# Patient Record
Sex: MALE | Race: ASIAN | NOT HISPANIC OR LATINO | Employment: FULL TIME | URBAN - METROPOLITAN AREA
[De-identification: names, ages, dates, MRNs, and addresses within clinical notes are randomized per-mention and may not be internally consistent; named-entity substitution may affect disease eponyms.]

---

## 2023-10-06 ENCOUNTER — HOSPITAL ENCOUNTER (OUTPATIENT)
Dept: NON INVASIVE DIAGNOSTICS | Facility: HOSPITAL | Age: 43
Discharge: HOME | End: 2023-10-06
Payer: COMMERCIAL

## 2023-10-06 VITALS
HEIGHT: 65 IN | HEART RATE: 114 BPM | WEIGHT: 154 LBS | SYSTOLIC BLOOD PRESSURE: 122 MMHG | BODY MASS INDEX: 25.66 KG/M2 | DIASTOLIC BLOOD PRESSURE: 71 MMHG

## 2023-10-06 DIAGNOSIS — K74.60 UNSPECIFIED CIRRHOSIS OF LIVER (HCC): ICD-10-CM

## 2023-10-06 LAB
AORTIC ROOT: 2.7 CM
APICAL FOUR CHAMBER EJECTION FRACTION: 63 %
ASCENDING AORTA: 2.6 CM
E WAVE DECELERATION TIME: 143 MS
E/A RATIO: 0.81
FRACTIONAL SHORTENING: 50 % (ref 28–44)
INTERVENTRICULAR SEPTUM IN DIASTOLE (PARASTERNAL SHORT AXIS VIEW): 0.9 CM
INTERVENTRICULAR SEPTUM: 0.9 CM (ref 0.6–1.1)
LAAS-AP2: 18.9 CM2
LAAS-AP4: 13.8 CM2
LEFT ATRIUM SIZE: 3.1 CM
LEFT ATRIUM VOLUME (MOD BIPLANE): 47 ML
LEFT INTERNAL DIMENSION IN SYSTOLE: 2.1 CM (ref 2.1–4)
LEFT VENTRICLE DIASTOLIC VOLUME (MOD BIPLANE): 68 ML
LEFT VENTRICLE SYSTOLIC VOLUME (MOD BIPLANE): 24 ML
LEFT VENTRICULAR INTERNAL DIMENSION IN DIASTOLE: 4.2 CM (ref 3.5–6)
LEFT VENTRICULAR POSTERIOR WALL IN END DIASTOLE: 0.9 CM
LEFT VENTRICULAR STROKE VOLUME: 63 ML
LV EF: 65 %
LVSV (TEICH): 63 ML
MV E'TISSUE VEL-SEP: 14 CM/S
MV PEAK A VEL: 1.58 M/S
MV PEAK E VEL: 128 CM/S
MV STENOSIS PRESSURE HALF TIME: 41 MS
MV VALVE AREA P 1/2 METHOD: 5.37 CM2
RIGHT ATRIUM AREA SYSTOLE A4C: 14.8 CM2
RIGHT VENTRICLE ID DIMENSION: 3.7 CM
SL CV LEFT ATRIUM LENGTH A2C: 4.8 CM
SL CV LV EF: 65
SL CV PED ECHO LEFT VENTRICLE DIASTOLIC VOLUME (MOD BIPLANE) 2D: 76 ML
SL CV PED ECHO LEFT VENTRICLE SYSTOLIC VOLUME (MOD BIPLANE) 2D: 14 ML
TR MAX PG: 8 MMHG
TR PEAK VELOCITY: 1.4 M/S
TRICUSPID VALVE PEAK REGURGITATION VELOCITY: 1.39 M/S

## 2023-10-06 PROCEDURE — 93306 TTE W/DOPPLER COMPLETE: CPT

## 2023-10-06 PROCEDURE — 93306 TTE W/DOPPLER COMPLETE: CPT | Performed by: INTERNAL MEDICINE

## 2023-10-08 ENCOUNTER — HOSPITAL ENCOUNTER (OUTPATIENT)
Dept: MRI IMAGING | Facility: HOSPITAL | Age: 43
Discharge: HOME | End: 2023-10-08
Payer: COMMERCIAL

## 2023-10-08 DIAGNOSIS — K74.60 UNSPECIFIED CIRRHOSIS OF LIVER (HCC): ICD-10-CM

## 2023-10-08 PROCEDURE — 74183 MRI ABD W/O CNTR FLWD CNTR: CPT

## 2023-10-08 PROCEDURE — G1004 CDSM NDSC: HCPCS

## 2023-10-08 PROCEDURE — A9585 GADOBUTROL INJECTION: HCPCS | Performed by: RADIOLOGY

## 2023-10-08 RX ORDER — GADOBUTROL 604.72 MG/ML
7 INJECTION INTRAVENOUS
Status: COMPLETED | OUTPATIENT
Start: 2023-10-08 | End: 2023-10-08

## 2023-10-08 RX ADMIN — GADOBUTROL 7 ML: 604.72 INJECTION INTRAVENOUS at 12:14

## 2023-10-24 ENCOUNTER — OFFICE VISIT (OUTPATIENT)
Dept: GASTROENTEROLOGY | Facility: CLINIC | Age: 43
End: 2023-10-24
Payer: COMMERCIAL

## 2023-10-24 VITALS
HEART RATE: 111 BPM | TEMPERATURE: 97.8 F | OXYGEN SATURATION: 96 % | BODY MASS INDEX: 23.65 KG/M2 | WEIGHT: 141.98 LBS | DIASTOLIC BLOOD PRESSURE: 82 MMHG | SYSTOLIC BLOOD PRESSURE: 134 MMHG | HEIGHT: 65 IN

## 2023-10-24 DIAGNOSIS — R60.0 LEG EDEMA: ICD-10-CM

## 2023-10-24 DIAGNOSIS — K74.60 CIRRHOSIS OF LIVER WITHOUT ASCITES, UNSPECIFIED HEPATIC CIRRHOSIS TYPE (HCC): Primary | ICD-10-CM

## 2023-10-24 DIAGNOSIS — I86.8 INTRA-ABDOMINAL VARICES: ICD-10-CM

## 2023-10-24 DIAGNOSIS — D61.818 PANCYTOPENIA (HCC): ICD-10-CM

## 2023-10-24 PROCEDURE — 99244 OFF/OP CNSLTJ NEW/EST MOD 40: CPT | Performed by: INTERNAL MEDICINE

## 2023-10-24 RX ORDER — METFORMIN HYDROCHLORIDE 500 MG/1
500 TABLET, EXTENDED RELEASE ORAL
COMMUNITY

## 2023-10-24 RX ORDER — ATORVASTATIN CALCIUM 10 MG/1
10 TABLET, FILM COATED ORAL DAILY
COMMUNITY

## 2023-10-24 NOTE — PROGRESS NOTES
[facility] - Outpatient Consultation  [facility] 44 y.o. male MRN: [id number]  Encounter: [id number]    PCP:  Mc Patel MD, [phone]  Referring Provider: Dr. Helen Rodriguez:      Summary:    Mr. Ryder Estes is a 44y.o. year old male with cirrhosis secondary to Nonalcoholic steatohepatitis and Auto immune hepatitis which is complicated with thrombocytopenia, and LE edema. Problem List and Plan:    Cirrhosis: Most MELD 3.0 Score is 11. Etiology is not clear, however he has risk for KRUGER related cirrhosis and has positive autoimmune studies (ELIGIO and elevated IgG). Liver biopsy would help determine etiology, however will need to address thrombocytopenia first.  He has no clinical indication for liver transplant at this time. MELD score < 15. Will plan to continue to check MELD labs every 3 months, imaging every 6 months and variceal screening every 1-2. Will need routine office visit every 3-6 months depending on stability. Thrombocytopenia/Pancytopenia:  Had BM biopsy recently. I do not have the report. I will refer him to our hematology team.  Hopefully they can review his BM biopsy as this degree of thrombocytopenia is not consistent with cirrhotic portal HTN alone. He requires EGD/liver biopsy and I hope hematology can help us address possibly benefit of doptelet or mulpleta. Volume: Ascites/Edema currently well controlled off diuretics. Will continue sodium restricted diet and continue to monitor for the development of ascites/edema. Hepatic Encephalopathy: No history of hepatic encephalopathy. Mr. Ryder Estes and his family/care giver are aware of the signs/symptoms of hepatic encephalopathy and understand to seek immediate medical attention should they arise. .  Esophageal Varices: No known history of esophageal varices, however MRI shows periesophageal and perigastric varices.   Would like to schedule EGD, however will need severe thrombocytopenia addressed first as above. Hepatoma Screening: Last abdominal imaging was an MRI done last month. Will plan for repeat imaging in March. Nutritional status: Mild sarcopenia suggest by physical exam.  Encouraged high protein snacking, low salt diet. If weight loss evident, will refer to nutritional counseling. Health Maintenance:  Mr. Valeriano Hill was counseled to avoid alcohol and tobacco products. Mr. Valeriano Hill was also advised to avoid raw seafood/oysters and from swimming in unchlorinated barrios, particularly from the [facility], due to increased risk of infection from Vibrio Vulnificus. Mr. Valeriano Hill was also instructed to seek immediate medical attention should he develop fevers over 101 F, evidence of GI bleeding (black or bloody stools, bloody or coffee ground emesis) or confusion. Mr. Valeriano Hill was advised to avoid NSAIDs, if possible, due to increase risk of renal dysfunction, and advised that if he should use acetaminophen, dose should not exceed 2 grams/day. Mr. Valeriano Hill was recommend to remain up to date with adult vaccination, including influenza, pneumovax and meningococcus. Inactivated HSV and zoster vaccine is safe and encouraged by PCP. Mr. Valeriano Hill was instructed to call either our office or that of his referring doctor should he develop worsening symptoms related to lower extremity edema, ascites, jaundice or excessive bruising/bleeding. FOLLOW-UP:  Return in about 3 months (around 1/24/2024). VISIT DIAGNOSES AND ORDERS:      1. Cirrhosis of liver without ascites, unspecified hepatic cirrhosis type (HCC)    2. Pancytopenia (HCC)    3. Leg edema    4.  Intra-abdominal varices      Orders Placed This Encounter   Procedures    CBC and differential    Protime-INR    Comprehensive metabolic panel    Ambulatory Referral to Hematology / Oncology     ______________________________________________________________________    HPI:  Mr. Milton Olson is a 44 y.o. male from [place] with medical history as outlined below, including type 2 diabetes, hyperlipidemia, history of dengue fever, strong family history of cirrhosis [2 maternal uncles], who comes in today for initial consultation. He has previously been, Dr. Allie Acevedo at [address] [place] [address], and Dr. Nasima Mckee at [facility] last month after referral for transplant evaluation. He was recently diagnosed with cirrhosis after right upper quadrant ultrasound, when hospitalized for sepsis and generalized weakness and May. He has a history of pancytopenia and underwent further evaluation including a bone marrow biopsy. He believes he has some LE edema at that time, which has resolved since. Work-up for chronic liver disease was done and was significant for a positive ELIGIO, elevated IgG. He does have a history of diabetes and hyperlipidemia. He did not undergo a liver biopsy. He had an MRI done on 10/8/2023 showing cirrhosis, splenomegaly and varices compatible with portal pretension with small volume of fluid in the gallbladder fossa. Most recent blood work is from 10/4/2023:  Sodium 141, creatinine 0.67, total protein 7.2, albumin 3.5, AST 54, ALT 30, total bilirubin 1.6, WBCs 2.9, hemoglobin 12.5, platelets 27, alpha-fetoprotein 6.4, INR 1.3. In the office today, he complains of difficulty sleeping and occasional daytime sleepiness. Mr. Mckenzie Martin denies recent or history of yellow eyes/skin, GI bleeding, abdominal distention with fluid, pruritus or confusion. He does complain of intermittent LE edema. He also complains of easy bruising and excessive bleeding. He denies abdominal pain, nausea, vomiting, heartburn, reflux, difficulty swallowing, early satiety, bloating, diarrhea, constipation or straining with passing stools. He does admit to forgetting to eat when busy at work, but denies lack of appetite.       REVIEW OF SYSTEMS:    Review of Systems  Per HPI    Historical Information   There is no problem list on file for this patient. Past Medical History:   Diagnosis Date    Cirrhosis ([address])     Diabetes mellitus ([address])     Low platelet count (HCC)     Spleen enlarged      History reviewed. No pertinent surgical history. Social History   Social History     Substance and Sexual Activity   Alcohol Use Not Currently     Social History     Substance and Sexual Activity   Drug Use Not Currently     Social History     Tobacco Use   Smoking Status Never   Smokeless Tobacco Never     History reviewed. No pertinent family history. Meds/Allergies       Current Outpatient Medications:     atorvastatin (LIPITOR) 10 mg tablet    metFORMIN (GLUCOPHAGE-XR) 500 mg 24 hr tablet    Allergies   Allergen Reactions    Aspirin Other (See Comments)     Pt states per Doctor            Objective     Blood pressure 134/82, pulse (!) 111, temperature 97.8 °F (36.6 °C), temperature source Tympanic, height 5' 5" (1.651 m), weight 64.4 kg (141 lb 15.6 oz), SpO2 96 %. Body mass index is 23.63 kg/m². PHYSICAL EXAM:           Physical Exam  Vitals reviewed. Constitutional:       General: He is not in acute distress. Appearance: Normal appearance. He is not ill-appearing. HENT:      Head: Normocephalic and atraumatic. Nose: Nose normal.      Mouth/Throat:      Pharynx: Oropharynx is clear. No posterior oropharyngeal erythema. Eyes:      General: No scleral icterus. Extraocular Movements: Extraocular movements intact. Cardiovascular:      Rate and Rhythm: Regular rhythm. Tachycardia present. Heart sounds: No murmur heard. Pulmonary:      Effort: Pulmonary effort is normal. No respiratory distress. Breath sounds: Normal breath sounds. No rales. Abdominal:      General: There is no distension. Palpations: Abdomen is soft. There is no shifting dullness, fluid wave, hepatomegaly or splenomegaly. Tenderness: There is no abdominal tenderness. There is no guarding.    Musculoskeletal:         General: No swelling. Normal range of motion. Cervical back: Normal range of motion and neck supple. Skin:     General: Skin is warm. Coloration: Skin is not jaundiced. Neurological:      General: No focal deficit present. Mental Status: He is oriented to person, place, and time. Psychiatric:         Mood and Affect: Mood normal.              Lab Results:   No results found for: "SODIUM", "K", "BUN", "CREATININE", "MG", "TBILI", "ALKPHOS", "ALB", "AST", "ALT", "WBC", "PLT", "HGB", "PT", "INR"  No results found for: "AFP"  No results found for: "IRON", "FERRITIN", "CONCFE", "TRANSFERRIN", "ELIGIO", "SMOOTHMUSCAB", "AMAIFA", "IGG", "IGM", "CERULOPLSM", "AATPHENOTYPE", "HAV", "HEPBSAG", "HEPBSAB", "HEPBCAB", "HEPCAB"  No results found for: "HGBA1C", "LABLIPI", "TSH"  Computed MELD 3.0 unavailable. Necessary lab results were not found in the last year. Computed MELD-Na unavailable. Necessary lab results were not found in the last year. Radiology Results:   I have reviewed the results of any radiology studies performed within the last 90 days. This includes:      MRI abdomen w wo contrast    Result Date: 10/18/2023  Narrative: MRI - ABDOMEN - WITH AND WITHOUT CONTRAST INDICATION: 44 years / Male  K66.58: Unspecified cirrhosis of liver. COMPARISON: None TECHNIQUE:  Multiplanar/multisequence MRI of the abdomen was performed before and after administration of contrast. Imaging performed on 1.5T MRI IV Contrast:  7 mL of Gadobutrol injection (SINGLE-DOSE) FINDINGS: LOWER CHEST:   Unremarkable. LIVER: Cirrhotic morphology of the liver. No suspicious mass. The hepatic veins and portal veins are patent. BILE DUCTS: No intrahepatic or extrahepatic bile duct dilation. GALLBLADDER: A small amount of free fluid around the gallbladder. No gallbladder wall thickening or gallbladder calculi. Pericholecystic fluid is likely secondary to cirrhosis. Darylene Pipe PANCREAS:  Unremarkable. ADRENAL GLANDS:  Unremarkable.  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 No hydroureteronephrosis. No suspicious renal mass. BOWEL:   No dilated loops of bowel. PERITONEUM/RETROPERITONEUM: No mass. No ascites. LYMPH NODES: No abdominal lymphadenopathy. VASCULAR STRUCTURES:  No aneurysm. Perisplenic, paraesophageal and gastrohepatic varices with a splenorenal shunt. ABDOMINAL WALL:  Unremarkable. OSSEOUS STRUCTURES:  No suspicious osseous lesion. Impression: Cirrhosis, splenomegaly and varices compatible with portal hypertension. Small volume of free fluid in the gallbladder fossa, without gallbladder wall thickening or cholelithiasis, likely secondary to cirrhosis. Clinical correlation is recommended to exclude possibility of acute cholecystitis. 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 The study was marked in EPIC for significant notification. Workstation performed: [id number]     Echo complete w/ contrast if indicated    Result Date: 10/6/2023  Narrative:   Left Ventricle: Left ventricular cavity size is normal. Wall thickness is normal. The left ventricular ejection fraction is 65%. Systolic function is normal. Wall motion is normal. Diastolic function is normal.   Right Ventricle: Right ventricular cavity size is normal. Systolic function is normal.   No significant valvular abnormalities.          Anju David MD  Hepatology/Gastroenterology

## 2023-10-24 NOTE — PATIENT INSTRUCTIONS
As discussed today:    Medications:  Continue taking your current medications without changes  Laboratory Testing (Listed below):  Please have blood work drawn in early November and then every 8 weeks. See hematology in consultation for your severe thrombocytopenia to discuss etiology and possible benefit of growth factors (doptelet or mulpleta)  Avoid alcohol and tobacco products. Also avoid raw seafood/oysters and avoid swimming/wading in unchlorinated barrios, particularly from the [facility], due to increased risk of infection from a bacteria called Vibrio Vulnificus. Avoid medications called NSAID's (Motrin/Ibuprofen, Naproxen/Naprosyn/Aleve) if possible, due to increase risk of kidney dysfunction, and if you use medications containing acetaminophen (Tylenol, percocet, Endocet, and many cough/cold medications), the dose should not exceed 2 grams/day. Seek immediate medical attention if you develop fevers over 101 F, have evidence of GI bleeding (black or bloody stools, bloody or coffee ground emesis) or confusion. Call our office if you develop worsening symptoms related to lower extremity edema, ascites, jaundice or excessive bruising/bleeding.

## 2023-10-25 ENCOUNTER — TELEPHONE (OUTPATIENT)
Dept: HEMATOLOGY ONCOLOGY | Facility: CLINIC | Age: 43
End: 2023-10-25

## 2023-10-25 NOTE — TELEPHONE ENCOUNTER
I called Yari Gautam in response to a referral that was received for patient to establish care with Medical Oncology. Outreach was made to schedule a consultation. Patient's voicemail is full and unable to accept messages at this time. Another attempt will be made to contact patient.

## 2023-11-30 ENCOUNTER — TELEPHONE (OUTPATIENT)
Dept: HEMATOLOGY ONCOLOGY | Facility: CLINIC | Age: 43
End: 2023-11-30

## 2023-11-30 NOTE — TELEPHONE ENCOUNTER
Appointment Confirmation   Who are you speaking with? Patient   If it is not the patient, are they listed on an active communication consent form? N/A   Which provider is the appointment scheduled with? Dr. Esther Rob   When is the appointment scheduled? Please list date and time 12/1/23 @ 8   At which location is the appointment scheduled to take place? [place]   Did caller verbalize understanding of appointment details?  Yes

## 2023-12-01 ENCOUNTER — TELEPHONE (OUTPATIENT)
Dept: HEMATOLOGY ONCOLOGY | Facility: CLINIC | Age: 43
End: 2023-12-01

## 2023-12-01 NOTE — TELEPHONE ENCOUNTER
Patient is requesting a call back he would like to speak with Dr. Lona Martin. He woke this morning not feeling well and called to rescheduled. I r/s him for first available consult 2/1/24 and he is requesting an earlier date because of his medical condition.   He can be reached at [phone]

## 2023-12-01 NOTE — TELEPHONE ENCOUNTER
Appointment Change  Cancel, Reschedule, Change to Virtual      Who are you speaking with? Patient   If it is not the patient, is the caller listed on the communication consent form? N/A   Which provider is the appointment scheduled with? Dr. Denise Gibson   When was the original appointment scheduled? Please list date and time 12/1/23 @ 8am   At which location is the appointment scheduled to take place? [place]   Was the appointment rescheduled? Was the appointment changed from an in person visit to a virtual visit? If so, please list the details of the change. Yes 2/1/24 @ 1pm   What is the reason for the appointment change? Patient isn't feeling well        Was STAR transport scheduled? no   Does STAR transport need to be scheduled for the new visit (if applicable) no   Does the patient need an infusion appointment rescheduled? no   Does the patient have an upcoming infusion appointment scheduled? If so, when? no   Is the patient undergoing chemotherapy? no   For appointments cancelled with less than 24 hours:  Was the no-show policy reviewed?  yes

## 2024-01-01 ENCOUNTER — APPOINTMENT (INPATIENT)
Dept: RADIOLOGY | Facility: HOSPITAL | Age: 44
DRG: 064 | End: 2024-01-01
Payer: COMMERCIAL

## 2024-01-01 ENCOUNTER — APPOINTMENT (EMERGENCY)
Dept: CT IMAGING | Facility: HOSPITAL | Age: 44
End: 2024-01-01
Payer: COMMERCIAL

## 2024-01-01 ENCOUNTER — APPOINTMENT (EMERGENCY)
Dept: RADIOLOGY | Facility: HOSPITAL | Age: 44
End: 2024-01-01
Payer: COMMERCIAL

## 2024-01-01 ENCOUNTER — APPOINTMENT (INPATIENT)
Dept: NEUROLOGY | Facility: CLINIC | Age: 44
DRG: 064 | End: 2024-01-01
Payer: COMMERCIAL

## 2024-01-01 ENCOUNTER — TELEPHONE (OUTPATIENT)
Dept: OTHER | Facility: OTHER | Age: 44
End: 2024-01-01

## 2024-01-01 ENCOUNTER — TELEPHONE (OUTPATIENT)
Dept: HEMATOLOGY ONCOLOGY | Facility: CLINIC | Age: 44
End: 2024-01-01

## 2024-01-01 ENCOUNTER — HOSPITAL ENCOUNTER (EMERGENCY)
Facility: HOSPITAL | Age: 44
End: 2024-09-10
Attending: STUDENT IN AN ORGANIZED HEALTH CARE EDUCATION/TRAINING PROGRAM | Admitting: STUDENT IN AN ORGANIZED HEALTH CARE EDUCATION/TRAINING PROGRAM
Payer: COMMERCIAL

## 2024-01-01 ENCOUNTER — TELEPHONE (OUTPATIENT)
Age: 44
End: 2024-01-01

## 2024-01-01 ENCOUNTER — HOSPITAL ENCOUNTER (INPATIENT)
Facility: HOSPITAL | Age: 44
LOS: 6 days | DRG: 064 | End: 2024-09-16
Attending: ANESTHESIOLOGY | Admitting: ANESTHESIOLOGY
Payer: COMMERCIAL

## 2024-01-01 VITALS
HEIGHT: 65 IN | HEART RATE: 124 BPM | OXYGEN SATURATION: 95 % | BODY MASS INDEX: 23.44 KG/M2 | SYSTOLIC BLOOD PRESSURE: 118 MMHG | DIASTOLIC BLOOD PRESSURE: 61 MMHG | TEMPERATURE: 99.3 F | RESPIRATION RATE: 11 BRPM

## 2024-01-01 VITALS
RESPIRATION RATE: 22 BRPM | HEART RATE: 90 BPM | SYSTOLIC BLOOD PRESSURE: 120 MMHG | BODY MASS INDEX: 23.44 KG/M2 | OXYGEN SATURATION: 100 % | DIASTOLIC BLOOD PRESSURE: 66 MMHG | TEMPERATURE: 97.7 F | WEIGHT: 140.87 LBS

## 2024-01-01 DIAGNOSIS — I61.9 ICH (INTRACEREBRAL HEMORRHAGE) (HCC): Primary | ICD-10-CM

## 2024-01-01 DIAGNOSIS — Z71.89 GOALS OF CARE, COUNSELING/DISCUSSION: ICD-10-CM

## 2024-01-01 DIAGNOSIS — Z51.5 PALLIATIVE CARE ENCOUNTER: ICD-10-CM

## 2024-01-01 LAB
ABO GROUP BLD BPU: NORMAL
ABO GROUP BLD: NORMAL
ALBUMIN SERPL BCG-MCNC: 2.2 G/DL (ref 3.5–5)
ALBUMIN SERPL BCG-MCNC: 2.5 G/DL (ref 3.5–5)
ALBUMIN SERPL BCG-MCNC: 3.1 G/DL (ref 3.5–5)
ALBUMIN SERPL BCG-MCNC: 3.1 G/DL (ref 3.5–5)
ALP SERPL-CCNC: 103 U/L (ref 34–104)
ALP SERPL-CCNC: 112 U/L (ref 34–104)
ALP SERPL-CCNC: 175 U/L (ref 34–104)
ALP SERPL-CCNC: 69 U/L (ref 34–104)
ALT SERPL W P-5'-P-CCNC: 22 U/L (ref 7–52)
ALT SERPL W P-5'-P-CCNC: 22 U/L (ref 7–52)
ALT SERPL W P-5'-P-CCNC: 25 U/L (ref 7–52)
ALT SERPL W P-5'-P-CCNC: 25 U/L (ref 7–52)
AMMONIA PLAS-SCNC: 46 UMOL/L (ref 18–72)
AMMONIA PLAS-SCNC: 87 UMOL/L (ref 18–72)
AMPHETAMINES SERPL QL SCN: NEGATIVE
ANION GAP SERPL CALCULATED.3IONS-SCNC: 10 MMOL/L (ref 4–13)
ANION GAP SERPL CALCULATED.3IONS-SCNC: 6 MMOL/L (ref 4–13)
ANION GAP SERPL CALCULATED.3IONS-SCNC: 7 MMOL/L (ref 4–13)
ANION GAP SERPL CALCULATED.3IONS-SCNC: 8 MMOL/L (ref 4–13)
ANION GAP SERPL CALCULATED.3IONS-SCNC: 9 MMOL/L (ref 4–13)
ANISOCYTOSIS BLD QL SMEAR: PRESENT
APTT PPP: 37 SECONDS (ref 23–34)
ARTERIAL PATENCY WRIST A: YES
AST SERPL W P-5'-P-CCNC: 46 U/L (ref 13–39)
AST SERPL W P-5'-P-CCNC: 49 U/L (ref 13–39)
AST SERPL W P-5'-P-CCNC: 55 U/L (ref 13–39)
AST SERPL W P-5'-P-CCNC: 55 U/L (ref 13–39)
ATRIAL RATE: 56 BPM
ATRIAL RATE: 63 BPM
ATRIAL RATE: 68 BPM
ATRIAL RATE: 81 BPM
ATRIAL RATE: 93 BPM
ATRIAL RATE: 96 BPM
BACTERIA UR QL AUTO: ABNORMAL /HPF
BARBITURATES UR QL: NEGATIVE
BASE EXCESS BLDA CALC-SCNC: -2 MMOL/L (ref -2–3)
BASE EXCESS BLDA CALC-SCNC: -3.7 MMOL/L
BASE EXCESS BLDA CALC-SCNC: -6 MMOL/L
BASE EXCESS BLDA CALC-SCNC: -6.1 MMOL/L
BASOPHILS # BLD AUTO: 0.01 THOUSANDS/ΜL (ref 0–0.1)
BASOPHILS # BLD AUTO: 0.01 THOUSANDS/ΜL (ref 0–0.1)
BASOPHILS # BLD MANUAL: 0 THOUSAND/UL (ref 0–0.1)
BASOPHILS # BLD MANUAL: 0 THOUSAND/UL (ref 0–0.1)
BASOPHILS # BLD MANUAL: 0.04 THOUSAND/UL (ref 0–0.1)
BASOPHILS NFR BLD AUTO: 0 % (ref 0–1)
BASOPHILS NFR BLD AUTO: 0 % (ref 0–1)
BASOPHILS NFR MAR MANUAL: 0 % (ref 0–1)
BASOPHILS NFR MAR MANUAL: 0 % (ref 0–1)
BASOPHILS NFR MAR MANUAL: 1 % (ref 0–1)
BENZODIAZ UR QL: NEGATIVE
BILIRUB SERPL-MCNC: 2.1 MG/DL (ref 0.2–1)
BILIRUB SERPL-MCNC: 2.35 MG/DL (ref 0.2–1)
BILIRUB SERPL-MCNC: 3.72 MG/DL (ref 0.2–1)
BILIRUB SERPL-MCNC: 5.01 MG/DL (ref 0.2–1)
BILIRUB UR QL STRIP: NEGATIVE
BLD GP AB SCN SERPL QL: NEGATIVE
BODY TEMPERATURE: 100.6 DEGREES FEHRENHEIT
BODY TEMPERATURE: 100.9 DEGREES FEHRENHEIT
BPU ID: NORMAL
BUN SERPL-MCNC: 10 MG/DL (ref 5–25)
BUN SERPL-MCNC: 10 MG/DL (ref 5–25)
BUN SERPL-MCNC: 11 MG/DL (ref 5–25)
BUN SERPL-MCNC: 12 MG/DL (ref 5–25)
BUN SERPL-MCNC: 12 MG/DL (ref 5–25)
BUN SERPL-MCNC: 13 MG/DL (ref 5–25)
BUN SERPL-MCNC: 14 MG/DL (ref 5–25)
BUN SERPL-MCNC: 8 MG/DL (ref 5–25)
BUN SERPL-MCNC: 9 MG/DL (ref 5–25)
CA-I BLD-SCNC: 1.05 MMOL/L (ref 1.12–1.32)
CA-I BLD-SCNC: 1.05 MMOL/L (ref 1.12–1.32)
CA-I BLD-SCNC: 1.08 MMOL/L (ref 1.12–1.32)
CA-I BLD-SCNC: 1.11 MMOL/L (ref 1.12–1.32)
CA-I BLD-SCNC: 1.13 MMOL/L (ref 1.12–1.32)
CA-I BLD-SCNC: 1.15 MMOL/L (ref 1.12–1.32)
CALCIUM ALBUM COR SERPL-MCNC: 8.5 MG/DL (ref 8.3–10.1)
CALCIUM ALBUM COR SERPL-MCNC: 8.6 MG/DL (ref 8.3–10.1)
CALCIUM ALBUM COR SERPL-MCNC: 8.9 MG/DL (ref 8.3–10.1)
CALCIUM ALBUM COR SERPL-MCNC: 8.9 MG/DL (ref 8.3–10.1)
CALCIUM SERPL-MCNC: 7.3 MG/DL (ref 8.4–10.2)
CALCIUM SERPL-MCNC: 7.4 MG/DL (ref 8.4–10.2)
CALCIUM SERPL-MCNC: 7.5 MG/DL (ref 8.4–10.2)
CALCIUM SERPL-MCNC: 7.5 MG/DL (ref 8.4–10.2)
CALCIUM SERPL-MCNC: 7.7 MG/DL (ref 8.4–10.2)
CALCIUM SERPL-MCNC: 7.8 MG/DL (ref 8.4–10.2)
CALCIUM SERPL-MCNC: 7.9 MG/DL (ref 8.4–10.2)
CALCIUM SERPL-MCNC: 8 MG/DL (ref 8.4–10.2)
CALCIUM SERPL-MCNC: 8.1 MG/DL (ref 8.4–10.2)
CALCIUM SERPL-MCNC: 8.2 MG/DL (ref 8.4–10.2)
CALCIUM SERPL-MCNC: 8.3 MG/DL (ref 8.4–10.2)
CFFMA (FUNCTIONAL FIBRINOGEN MAX AMPLITUDE): 16.7 MM (ref 15–32)
CFFMA (FUNCTIONAL FIBRINOGEN MAX AMPLITUDE): 17.9 MM (ref 15–32)
CFFMA (FUNCTIONAL FIBRINOGEN MAX AMPLITUDE): 18.4 MM (ref 15–32)
CHLORIDE SERPL-SCNC: 109 MMOL/L (ref 96–108)
CHLORIDE SERPL-SCNC: 109 MMOL/L (ref 96–108)
CHLORIDE SERPL-SCNC: 111 MMOL/L (ref 96–108)
CHLORIDE SERPL-SCNC: 116 MMOL/L (ref 96–108)
CHLORIDE SERPL-SCNC: 120 MMOL/L (ref 96–108)
CHLORIDE SERPL-SCNC: 120 MMOL/L (ref 96–108)
CHLORIDE SERPL-SCNC: 121 MMOL/L (ref 96–108)
CHLORIDE SERPL-SCNC: 122 MMOL/L (ref 96–108)
CHLORIDE SERPL-SCNC: 125 MMOL/L (ref 96–108)
CHLORIDE SERPL-SCNC: 126 MMOL/L (ref 96–108)
CHLORIDE SERPL-SCNC: 128 MMOL/L (ref 96–108)
CHLORIDE SERPL-SCNC: 130 MMOL/L (ref 96–108)
CHLORIDE SERPL-SCNC: 131 MMOL/L (ref 96–108)
CHLORIDE SERPL-SCNC: 134 MMOL/L (ref 96–108)
CHLORIDE SERPL-SCNC: 134 MMOL/L (ref 96–108)
CHLORIDE SERPL-SCNC: 135 MMOL/L (ref 96–108)
CKLY30: 0 % (ref 0–2.6)
CKLY30: 0 % (ref 0–2.6)
CKLY30: 1 % (ref 0–2.6)
CKR(REACTION TIME): 4.7 MIN (ref 4.6–9.1)
CKR(REACTION TIME): 5.4 MIN (ref 4.6–9.1)
CKR(REACTION TIME): 8.7 MIN (ref 4.6–9.1)
CLARITY UR: CLEAR
CO2 SERPL-SCNC: 14 MMOL/L (ref 21–32)
CO2 SERPL-SCNC: 14 MMOL/L (ref 21–32)
CO2 SERPL-SCNC: 15 MMOL/L (ref 21–32)
CO2 SERPL-SCNC: 16 MMOL/L (ref 21–32)
CO2 SERPL-SCNC: 17 MMOL/L (ref 21–32)
CO2 SERPL-SCNC: 18 MMOL/L (ref 21–32)
CO2 SERPL-SCNC: 19 MMOL/L (ref 21–32)
CO2 SERPL-SCNC: 19 MMOL/L (ref 21–32)
CO2 SERPL-SCNC: 20 MMOL/L (ref 21–32)
CO2 SERPL-SCNC: 21 MMOL/L (ref 21–32)
COCAINE UR QL: NEGATIVE
COLOR UR: ABNORMAL
CREAT SERPL-MCNC: 0.47 MG/DL (ref 0.6–1.3)
CREAT SERPL-MCNC: 0.54 MG/DL (ref 0.6–1.3)
CREAT SERPL-MCNC: 0.54 MG/DL (ref 0.6–1.3)
CREAT SERPL-MCNC: 0.55 MG/DL (ref 0.6–1.3)
CREAT SERPL-MCNC: 0.56 MG/DL (ref 0.6–1.3)
CREAT SERPL-MCNC: 0.57 MG/DL (ref 0.6–1.3)
CREAT SERPL-MCNC: 0.61 MG/DL (ref 0.6–1.3)
CREAT SERPL-MCNC: 0.61 MG/DL (ref 0.6–1.3)
CREAT SERPL-MCNC: 0.63 MG/DL (ref 0.6–1.3)
CREAT SERPL-MCNC: 0.66 MG/DL (ref 0.6–1.3)
CREAT SERPL-MCNC: 0.71 MG/DL (ref 0.6–1.3)
CREAT SERPL-MCNC: 0.74 MG/DL (ref 0.6–1.3)
CREAT SERPL-MCNC: 0.75 MG/DL (ref 0.6–1.3)
CREAT SERPL-MCNC: 0.76 MG/DL (ref 0.6–1.3)
CREAT SERPL-MCNC: 0.78 MG/DL (ref 0.6–1.3)
CREAT SERPL-MCNC: 0.82 MG/DL (ref 0.6–1.3)
CRTMA(RAPIDTEG MAX AMPLITUDE): 42.7 MM (ref 52–70)
CRTMA(RAPIDTEG MAX AMPLITUDE): 50.8 MM (ref 52–70)
CRTMA(RAPIDTEG MAX AMPLITUDE): 52.8 MM (ref 52–70)
DACRYOCYTES BLD QL SMEAR: PRESENT
EOSINOPHIL # BLD AUTO: 0.01 THOUSAND/ΜL (ref 0–0.61)
EOSINOPHIL # BLD AUTO: 0.04 THOUSAND/ΜL (ref 0–0.61)
EOSINOPHIL # BLD MANUAL: 0.03 THOUSAND/UL (ref 0–0.4)
EOSINOPHIL # BLD MANUAL: 0.06 THOUSAND/UL (ref 0–0.4)
EOSINOPHIL # BLD MANUAL: 0.19 THOUSAND/UL (ref 0–0.4)
EOSINOPHIL NFR BLD AUTO: 0 % (ref 0–6)
EOSINOPHIL NFR BLD AUTO: 1 % (ref 0–6)
EOSINOPHIL NFR BLD MANUAL: 2 % (ref 0–6)
EOSINOPHIL NFR BLD MANUAL: 2 % (ref 0–6)
EOSINOPHIL NFR BLD MANUAL: 5 % (ref 0–6)
ERYTHROCYTE [DISTWIDTH] IN BLOOD BY AUTOMATED COUNT: 16.5 % (ref 11.6–15.1)
ERYTHROCYTE [DISTWIDTH] IN BLOOD BY AUTOMATED COUNT: 16.6 % (ref 11.6–15.1)
ERYTHROCYTE [DISTWIDTH] IN BLOOD BY AUTOMATED COUNT: 16.8 % (ref 11.6–15.1)
ERYTHROCYTE [DISTWIDTH] IN BLOOD BY AUTOMATED COUNT: 17.2 % (ref 11.6–15.1)
ERYTHROCYTE [DISTWIDTH] IN BLOOD BY AUTOMATED COUNT: 17.3 % (ref 11.6–15.1)
ERYTHROCYTE [DISTWIDTH] IN BLOOD BY AUTOMATED COUNT: 17.7 % (ref 11.6–15.1)
ERYTHROCYTE [DISTWIDTH] IN BLOOD BY AUTOMATED COUNT: 18 % (ref 11.6–15.1)
EST. AVERAGE GLUCOSE BLD GHB EST-MCNC: 114 MG/DL
ETHANOL SERPL-MCNC: <10 MG/DL
FENTANYL UR QL SCN: NEGATIVE
GFR SERPL CREATININE-BSD FRML MDRD: 108 ML/MIN/1.73SQ M
GFR SERPL CREATININE-BSD FRML MDRD: 110 ML/MIN/1.73SQ M
GFR SERPL CREATININE-BSD FRML MDRD: 111 ML/MIN/1.73SQ M
GFR SERPL CREATININE-BSD FRML MDRD: 112 ML/MIN/1.73SQ M
GFR SERPL CREATININE-BSD FRML MDRD: 112 ML/MIN/1.73SQ M
GFR SERPL CREATININE-BSD FRML MDRD: 114 ML/MIN/1.73SQ M
GFR SERPL CREATININE-BSD FRML MDRD: 118 ML/MIN/1.73SQ M
GFR SERPL CREATININE-BSD FRML MDRD: 120 ML/MIN/1.73SQ M
GFR SERPL CREATININE-BSD FRML MDRD: 122 ML/MIN/1.73SQ M
GFR SERPL CREATININE-BSD FRML MDRD: 122 ML/MIN/1.73SQ M
GFR SERPL CREATININE-BSD FRML MDRD: 125 ML/MIN/1.73SQ M
GFR SERPL CREATININE-BSD FRML MDRD: 126 ML/MIN/1.73SQ M
GFR SERPL CREATININE-BSD FRML MDRD: 127 ML/MIN/1.73SQ M
GFR SERPL CREATININE-BSD FRML MDRD: 128 ML/MIN/1.73SQ M
GFR SERPL CREATININE-BSD FRML MDRD: 128 ML/MIN/1.73SQ M
GFR SERPL CREATININE-BSD FRML MDRD: 136 ML/MIN/1.73SQ M
GLUCOSE SERPL-MCNC: 129 MG/DL (ref 65–140)
GLUCOSE SERPL-MCNC: 132 MG/DL (ref 65–140)
GLUCOSE SERPL-MCNC: 133 MG/DL (ref 65–140)
GLUCOSE SERPL-MCNC: 141 MG/DL (ref 65–140)
GLUCOSE SERPL-MCNC: 145 MG/DL (ref 65–140)
GLUCOSE SERPL-MCNC: 147 MG/DL (ref 65–140)
GLUCOSE SERPL-MCNC: 148 MG/DL (ref 65–140)
GLUCOSE SERPL-MCNC: 149 MG/DL (ref 65–140)
GLUCOSE SERPL-MCNC: 153 MG/DL (ref 65–140)
GLUCOSE SERPL-MCNC: 154 MG/DL (ref 65–140)
GLUCOSE SERPL-MCNC: 156 MG/DL (ref 65–140)
GLUCOSE SERPL-MCNC: 158 MG/DL (ref 65–140)
GLUCOSE SERPL-MCNC: 159 MG/DL (ref 65–140)
GLUCOSE SERPL-MCNC: 161 MG/DL (ref 65–140)
GLUCOSE SERPL-MCNC: 162 MG/DL (ref 65–140)
GLUCOSE SERPL-MCNC: 165 MG/DL (ref 65–140)
GLUCOSE SERPL-MCNC: 168 MG/DL (ref 65–140)
GLUCOSE SERPL-MCNC: 170 MG/DL (ref 65–140)
GLUCOSE SERPL-MCNC: 171 MG/DL (ref 65–140)
GLUCOSE SERPL-MCNC: 173 MG/DL (ref 65–140)
GLUCOSE SERPL-MCNC: 178 MG/DL (ref 65–140)
GLUCOSE SERPL-MCNC: 179 MG/DL (ref 65–140)
GLUCOSE SERPL-MCNC: 182 MG/DL (ref 65–140)
GLUCOSE SERPL-MCNC: 187 MG/DL (ref 65–140)
GLUCOSE SERPL-MCNC: 190 MG/DL (ref 65–140)
GLUCOSE SERPL-MCNC: 191 MG/DL (ref 65–140)
GLUCOSE SERPL-MCNC: 194 MG/DL (ref 65–140)
GLUCOSE SERPL-MCNC: 195 MG/DL (ref 65–140)
GLUCOSE SERPL-MCNC: 202 MG/DL (ref 65–140)
GLUCOSE SERPL-MCNC: 202 MG/DL (ref 65–140)
GLUCOSE SERPL-MCNC: 203 MG/DL (ref 65–140)
GLUCOSE SERPL-MCNC: 212 MG/DL (ref 65–140)
GLUCOSE SERPL-MCNC: 242 MG/DL (ref 65–140)
GLUCOSE SERPL-MCNC: 244 MG/DL (ref 65–140)
GLUCOSE SERPL-MCNC: 261 MG/DL (ref 65–140)
GLUCOSE SERPL-MCNC: 274 MG/DL (ref 65–140)
GLUCOSE SERPL-MCNC: 278 MG/DL (ref 65–140)
GLUCOSE UR STRIP-MCNC: NEGATIVE MG/DL
HBA1C MFR BLD: 5.6 %
HCO3 BLDA-SCNC: 15.1 MMOL/L (ref 22–28)
HCO3 BLDA-SCNC: 16.1 MMOL/L (ref 22–28)
HCO3 BLDA-SCNC: 19.3 MMOL/L (ref 22–28)
HCO3 BLDA-SCNC: 20.2 MMOL/L (ref 24–30)
HCT VFR BLD AUTO: 29.2 % (ref 36.5–49.3)
HCT VFR BLD AUTO: 30.8 % (ref 36.5–49.3)
HCT VFR BLD AUTO: 31.6 % (ref 36.5–49.3)
HCT VFR BLD AUTO: 32 % (ref 36.5–49.3)
HCT VFR BLD AUTO: 32.5 % (ref 36.5–49.3)
HCT VFR BLD AUTO: 35.6 % (ref 36.5–49.3)
HCT VFR BLD AUTO: 36.1 % (ref 36.5–49.3)
HCT VFR BLD CALC: 35 % (ref 36.5–49.3)
HGB BLD-MCNC: 10.1 G/DL (ref 12–17)
HGB BLD-MCNC: 10.2 G/DL (ref 12–17)
HGB BLD-MCNC: 10.3 G/DL (ref 12–17)
HGB BLD-MCNC: 10.8 G/DL (ref 12–17)
HGB BLD-MCNC: 12.4 G/DL (ref 12–17)
HGB BLD-MCNC: 12.5 G/DL (ref 12–17)
HGB BLD-MCNC: 9.6 G/DL (ref 12–17)
HGB BLDA-MCNC: 11.9 G/DL (ref 12–17)
HGB UR QL STRIP.AUTO: ABNORMAL
HYDROCODONE UR QL SCN: NEGATIVE
IMM GRANULOCYTES # BLD AUTO: 0.01 THOUSAND/UL (ref 0–0.2)
IMM GRANULOCYTES # BLD AUTO: 0.02 THOUSAND/UL (ref 0–0.2)
IMM GRANULOCYTES NFR BLD AUTO: 0 % (ref 0–2)
IMM GRANULOCYTES NFR BLD AUTO: 1 % (ref 0–2)
INR PPP: 1.43 (ref 0.85–1.19)
INR PPP: 1.44 (ref 0.85–1.19)
KETONES UR STRIP-MCNC: NEGATIVE MG/DL
LACTATE SERPL-SCNC: 2.7 MMOL/L (ref 0.5–2)
LACTATE SERPL-SCNC: 3.9 MMOL/L (ref 0.5–2)
LACTATE SERPL-SCNC: 4 MMOL/L (ref 0.5–2)
LACTATE SERPL-SCNC: 4 MMOL/L (ref 0.5–2)
LEUKOCYTE ESTERASE UR QL STRIP: NEGATIVE
LYMPHOCYTES # BLD AUTO: 0.33 THOUSAND/UL (ref 0.6–4.47)
LYMPHOCYTES # BLD AUTO: 0.43 THOUSAND/UL (ref 0.6–4.47)
LYMPHOCYTES # BLD AUTO: 0.68 THOUSANDS/ΜL (ref 0.6–4.47)
LYMPHOCYTES # BLD AUTO: 0.77 THOUSAND/UL (ref 0.6–4.47)
LYMPHOCYTES # BLD AUTO: 0.93 THOUSANDS/ΜL (ref 0.6–4.47)
LYMPHOCYTES # BLD AUTO: 21 % (ref 14–44)
LYMPHOCYTES # BLD AUTO: 25 % (ref 14–44)
LYMPHOCYTES # BLD AUTO: 9 % (ref 14–44)
LYMPHOCYTES NFR BLD AUTO: 16 % (ref 14–44)
LYMPHOCYTES NFR BLD AUTO: 21 % (ref 14–44)
MACROCYTES BLD QL AUTO: PRESENT
MACROCYTES BLD QL AUTO: PRESENT
MAGNESIUM SERPL-MCNC: 1.4 MG/DL (ref 1.9–2.7)
MAGNESIUM SERPL-MCNC: 1.5 MG/DL (ref 1.9–2.7)
MAGNESIUM SERPL-MCNC: 1.7 MG/DL (ref 1.9–2.7)
MAGNESIUM SERPL-MCNC: 1.7 MG/DL (ref 1.9–2.7)
MAGNESIUM SERPL-MCNC: 1.8 MG/DL (ref 1.9–2.7)
MAGNESIUM SERPL-MCNC: 2 MG/DL (ref 1.9–2.7)
MAGNESIUM SERPL-MCNC: 2 MG/DL (ref 1.9–2.7)
MCH RBC QN AUTO: 34.6 PG (ref 26.8–34.3)
MCH RBC QN AUTO: 35.1 PG (ref 26.8–34.3)
MCH RBC QN AUTO: 35.4 PG (ref 26.8–34.3)
MCH RBC QN AUTO: 35.4 PG (ref 26.8–34.3)
MCH RBC QN AUTO: 35.8 PG (ref 26.8–34.3)
MCH RBC QN AUTO: 35.9 PG (ref 26.8–34.3)
MCH RBC QN AUTO: 36.3 PG (ref 26.8–34.3)
MCHC RBC AUTO-ENTMCNC: 31.9 G/DL (ref 31.4–37.4)
MCHC RBC AUTO-ENTMCNC: 32.6 G/DL (ref 31.4–37.4)
MCHC RBC AUTO-ENTMCNC: 32.8 G/DL (ref 31.4–37.4)
MCHC RBC AUTO-ENTMCNC: 32.9 G/DL (ref 31.4–37.4)
MCHC RBC AUTO-ENTMCNC: 33.2 G/DL (ref 31.4–37.4)
MCHC RBC AUTO-ENTMCNC: 34.6 G/DL (ref 31.4–37.4)
MCHC RBC AUTO-ENTMCNC: 34.8 G/DL (ref 31.4–37.4)
MCV RBC AUTO: 102 FL (ref 82–98)
MCV RBC AUTO: 104 FL (ref 82–98)
MCV RBC AUTO: 106 FL (ref 82–98)
MCV RBC AUTO: 107 FL (ref 82–98)
MCV RBC AUTO: 108 FL (ref 82–98)
MCV RBC AUTO: 108 FL (ref 82–98)
MCV RBC AUTO: 112 FL (ref 82–98)
METAMYELOCYTE ABSOLUTE CT: 0.02 THOUSAND/UL (ref 0–0.1)
METAMYELOCYTES NFR BLD MANUAL: 1 % (ref 0–1)
METHADONE UR QL: NEGATIVE
MONOCYTES # BLD AUTO: 0.06 THOUSAND/UL (ref 0–1.22)
MONOCYTES # BLD AUTO: 0.15 THOUSAND/UL (ref 0–1.22)
MONOCYTES # BLD AUTO: 0.16 THOUSAND/UL (ref 0–1.22)
MONOCYTES # BLD AUTO: 0.24 THOUSAND/ΜL (ref 0.17–1.22)
MONOCYTES # BLD AUTO: 0.59 THOUSAND/ΜL (ref 0.17–1.22)
MONOCYTES NFR BLD AUTO: 13 % (ref 4–12)
MONOCYTES NFR BLD AUTO: 6 % (ref 4–12)
MONOCYTES NFR BLD: 4 % (ref 4–12)
MONOCYTES NFR BLD: 4 % (ref 4–12)
MONOCYTES NFR BLD: 5 % (ref 4–12)
MYELOCYTE ABSOLUTE CT: 0.02 THOUSAND/UL (ref 0–0.1)
MYELOCYTES NFR BLD MANUAL: 1 % (ref 0–1)
NEUTROPHILS # BLD AUTO: 2.83 THOUSANDS/ΜL (ref 1.85–7.62)
NEUTROPHILS # BLD AUTO: 3.37 THOUSANDS/ΜL (ref 1.85–7.62)
NEUTROPHILS # BLD MANUAL: 1.05 THOUSAND/UL (ref 1.85–7.62)
NEUTROPHILS # BLD MANUAL: 2.08 THOUSAND/UL (ref 1.85–7.62)
NEUTROPHILS # BLD MANUAL: 3.07 THOUSAND/UL (ref 1.85–7.62)
NEUTS BAND NFR BLD MANUAL: 1 % (ref 0–8)
NEUTS BAND NFR BLD MANUAL: 21 % (ref 0–8)
NEUTS BAND NFR BLD MANUAL: 35 % (ref 0–8)
NEUTS SEG NFR BLD AUTO: 44 % (ref 43–75)
NEUTS SEG NFR BLD AUTO: 49 % (ref 43–75)
NEUTS SEG NFR BLD AUTO: 64 % (ref 43–75)
NEUTS SEG NFR BLD AUTO: 67 % (ref 43–75)
NEUTS SEG NFR BLD AUTO: 78 % (ref 43–75)
NITRITE UR QL STRIP: NEGATIVE
NON-SQ EPI CELLS URNS QL MICRO: ABNORMAL /HPF
NRBC BLD AUTO-RTO: 0 /100 WBCS
NRBC BLD AUTO-RTO: 1 /100 WBC (ref 0–2)
NRBC BLD AUTO-RTO: 1 /100 WBCS
NRBC BLD AUTO-RTO: 13 /100 WBC (ref 0–2)
NRBC BLD AUTO-RTO: 2 /100 WBCS
NRBC BLD AUTO-RTO: 7 /100 WBCS
O2 CT BLDA-SCNC: 13.3 ML/DL (ref 16–23)
O2 CT BLDA-SCNC: 14.7 ML/DL (ref 16–23)
O2 CT BLDA-SCNC: 17.3 ML/DL (ref 16–23)
OPIATES UR QL SCN: NEGATIVE
OSMOLALITY UR/SERPL-RTO: 296 MMOL/KG (ref 282–298)
OSMOLALITY UR/SERPL-RTO: 304 MMOL/KG (ref 282–298)
OSMOLALITY UR/SERPL-RTO: 309 MMOL/KG (ref 282–298)
OSMOLALITY UR/SERPL-RTO: 317 MMOL/KG (ref 282–298)
OSMOLALITY UR/SERPL-RTO: 321 MMOL/KG (ref 282–298)
OSMOLALITY UR/SERPL-RTO: 329 MMOL/KG (ref 282–298)
OSMOLALITY UR/SERPL-RTO: 331 MMOL/KG (ref 282–298)
OSMOLALITY UR/SERPL-RTO: 335 MMOL/KG (ref 282–298)
OXYCODONE+OXYMORPHONE UR QL SCN: NEGATIVE
OXYHGB MFR BLDA: 95.6 % (ref 94–97)
OXYHGB MFR BLDA: 98.1 % (ref 94–97)
OXYHGB MFR BLDA: 98.5 % (ref 94–97)
P AXIS: 33 DEGREES
P AXIS: 34 DEGREES
P AXIS: 35 DEGREES
P AXIS: 41 DEGREES
P AXIS: 60 DEGREES
P AXIS: 66 DEGREES
PCO2 BLD: 21 MMOL/L (ref 21–32)
PCO2 BLD: 26.5 MM HG (ref 42–50)
PCO2 BLDA: 19.4 MM HG (ref 36–44)
PCO2 BLDA: 21.7 MM HG (ref 36–44)
PCO2 BLDA: 29 MM HG (ref 36–44)
PCO2 TEMP ADJ BLDA: 20.4 MM HG (ref 36–44)
PCO2 TEMP ADJ BLDA: 23 MM HG (ref 36–44)
PCP UR QL: NEGATIVE
PH BLD: 7.47 [PH] (ref 7.35–7.45)
PH BLD: 7.49 [PH] (ref 7.35–7.45)
PH BLD: 7.49 [PH] (ref 7.3–7.4)
PH BLDA: 7.44 [PH] (ref 7.35–7.45)
PH BLDA: 7.49 [PH] (ref 7.35–7.45)
PH BLDA: 7.51 [PH] (ref 7.35–7.45)
PH UR STRIP.AUTO: 5.5 [PH]
PHOSPHATE SERPL-MCNC: 1.6 MG/DL (ref 2.7–4.5)
PHOSPHATE SERPL-MCNC: 1.9 MG/DL (ref 2.7–4.5)
PHOSPHATE SERPL-MCNC: 2.3 MG/DL (ref 2.7–4.5)
PHOSPHATE SERPL-MCNC: 2.6 MG/DL (ref 2.7–4.5)
PHOSPHATE SERPL-MCNC: 2.8 MG/DL (ref 2.7–4.5)
PHOSPHATE SERPL-MCNC: 3.1 MG/DL (ref 2.7–4.5)
PHOSPHATE SERPL-MCNC: 5.2 MG/DL (ref 2.7–4.5)
PLATELET # BLD AUTO: 18 THOUSANDS/UL (ref 149–390)
PLATELET # BLD AUTO: 20 THOUSANDS/UL (ref 149–390)
PLATELET # BLD AUTO: 32 THOUSANDS/UL (ref 149–390)
PLATELET # BLD AUTO: 33 THOUSANDS/UL (ref 149–390)
PLATELET # BLD AUTO: 35 THOUSANDS/UL (ref 149–390)
PLATELET # BLD AUTO: 45 THOUSANDS/UL (ref 149–390)
PLATELET # BLD AUTO: 53 THOUSANDS/UL (ref 149–390)
PLATELET # BLD AUTO: 59 THOUSANDS/UL (ref 149–390)
PLATELET # BLD AUTO: 67 THOUSANDS/UL (ref 149–390)
PLATELET BLD QL SMEAR: ABNORMAL
PMV BLD AUTO: 11.1 FL (ref 8.9–12.7)
PMV BLD AUTO: 11.3 FL (ref 8.9–12.7)
PMV BLD AUTO: 11.4 FL (ref 8.9–12.7)
PMV BLD AUTO: 12 FL (ref 8.9–12.7)
PMV BLD AUTO: 12.2 FL (ref 8.9–12.7)
PMV BLD AUTO: 12.7 FL (ref 8.9–12.7)
PO2 BLD: 182.4 MM HG (ref 75–129)
PO2 BLD: 71 MM HG (ref 35–45)
PO2 BLD: 88.5 MM HG (ref 75–129)
PO2 BLDA: 137.9 MM HG (ref 75–129)
PO2 BLDA: 175.1 MM HG (ref 75–129)
PO2 BLDA: 82.4 MM HG (ref 75–129)
POIKILOCYTOSIS BLD QL SMEAR: PRESENT
POLYCHROMASIA BLD QL SMEAR: PRESENT
POTASSIUM BLD-SCNC: 3.8 MMOL/L (ref 3.5–5.3)
POTASSIUM SERPL-SCNC: 2.7 MMOL/L (ref 3.5–5.3)
POTASSIUM SERPL-SCNC: 2.9 MMOL/L (ref 3.5–5.3)
POTASSIUM SERPL-SCNC: 3.1 MMOL/L (ref 3.5–5.3)
POTASSIUM SERPL-SCNC: 3.1 MMOL/L (ref 3.5–5.3)
POTASSIUM SERPL-SCNC: 3.5 MMOL/L (ref 3.5–5.3)
POTASSIUM SERPL-SCNC: 3.6 MMOL/L (ref 3.5–5.3)
POTASSIUM SERPL-SCNC: 3.6 MMOL/L (ref 3.5–5.3)
POTASSIUM SERPL-SCNC: 3.7 MMOL/L (ref 3.5–5.3)
POTASSIUM SERPL-SCNC: 3.8 MMOL/L (ref 3.5–5.3)
POTASSIUM SERPL-SCNC: 3.9 MMOL/L (ref 3.5–5.3)
POTASSIUM SERPL-SCNC: 4 MMOL/L (ref 3.5–5.3)
POTASSIUM SERPL-SCNC: 4 MMOL/L (ref 3.5–5.3)
POTASSIUM SERPL-SCNC: 4.2 MMOL/L (ref 3.5–5.3)
POTASSIUM SERPL-SCNC: 4.3 MMOL/L (ref 3.5–5.3)
PR INTERVAL: 113 MS
PR INTERVAL: 125 MS
PR INTERVAL: 129 MS
PR INTERVAL: 150 MS
PR INTERVAL: 150 MS
PR INTERVAL: 154 MS
PROCALCITONIN SERPL-MCNC: 0.7 NG/ML
PROT SERPL-MCNC: 5.3 G/DL (ref 6.4–8.4)
PROT SERPL-MCNC: 5.7 G/DL (ref 6.4–8.4)
PROT SERPL-MCNC: 7 G/DL (ref 6.4–8.4)
PROT SERPL-MCNC: 7.3 G/DL (ref 6.4–8.4)
PROT UR STRIP-MCNC: ABNORMAL MG/DL
PROTHROMBIN TIME: 17.8 SECONDS (ref 12.3–15)
PROTHROMBIN TIME: 18.2 SECONDS (ref 12.3–15)
PS CM H2O: 12
PS VENT FIO2: 40
PS VENT FIO2: 80
PS VENT PEEP: 6
PS VENT PEEP: 6
QRS AXIS: 68 DEGREES
QRS AXIS: 71 DEGREES
QRS AXIS: 73 DEGREES
QRS AXIS: 74 DEGREES
QRS AXIS: 77 DEGREES
QRS AXIS: 81 DEGREES
QRSD INTERVAL: 75 MS
QRSD INTERVAL: 79 MS
QRSD INTERVAL: 79 MS
QRSD INTERVAL: 84 MS
QRSD INTERVAL: 88 MS
QRSD INTERVAL: 92 MS
QT INTERVAL: 350 MS
QT INTERVAL: 364 MS
QT INTERVAL: 425 MS
QT INTERVAL: 425 MS
QT INTERVAL: 429 MS
QT INTERVAL: 438 MS
QTC INTERVAL: 407 MS
QTC INTERVAL: 423 MS
QTC INTERVAL: 440 MS
QTC INTERVAL: 452 MS
QTC INTERVAL: 452 MS
QTC INTERVAL: 538 MS
RBC # BLD AUTO: 2.71 MILLION/UL (ref 3.88–5.62)
RBC # BLD AUTO: 2.85 MILLION/UL (ref 3.88–5.62)
RBC # BLD AUTO: 2.88 MILLION/UL (ref 3.88–5.62)
RBC # BLD AUTO: 2.98 MILLION/UL (ref 3.88–5.62)
RBC # BLD AUTO: 3.01 MILLION/UL (ref 3.88–5.62)
RBC # BLD AUTO: 3.42 MILLION/UL (ref 3.88–5.62)
RBC # BLD AUTO: 3.53 MILLION/UL (ref 3.88–5.62)
RBC #/AREA URNS AUTO: ABNORMAL /HPF
RBC MORPH BLD: PRESENT
RH BLD: POSITIVE
SAO2 % BLD FROM PO2: 96 % (ref 60–85)
SODIUM BLD-SCNC: 141 MMOL/L (ref 136–145)
SODIUM SERPL-SCNC: 138 MMOL/L (ref 135–147)
SODIUM SERPL-SCNC: 138 MMOL/L (ref 135–147)
SODIUM SERPL-SCNC: 142 MMOL/L (ref 135–147)
SODIUM SERPL-SCNC: 143 MMOL/L (ref 135–147)
SODIUM SERPL-SCNC: 146 MMOL/L (ref 135–147)
SODIUM SERPL-SCNC: 147 MMOL/L (ref 135–147)
SODIUM SERPL-SCNC: 148 MMOL/L (ref 135–147)
SODIUM SERPL-SCNC: 149 MMOL/L (ref 135–147)
SODIUM SERPL-SCNC: 150 MMOL/L (ref 135–147)
SODIUM SERPL-SCNC: 151 MMOL/L (ref 135–147)
SODIUM SERPL-SCNC: 153 MMOL/L (ref 135–147)
SODIUM SERPL-SCNC: 154 MMOL/L (ref 135–147)
SODIUM SERPL-SCNC: 154 MMOL/L (ref 135–147)
SODIUM SERPL-SCNC: 156 MMOL/L (ref 135–147)
SODIUM SERPL-SCNC: 159 MMOL/L (ref 135–147)
SODIUM SERPL-SCNC: 160 MMOL/L (ref 135–147)
SP GR UR STRIP.AUTO: 1.01 (ref 1–1.03)
SPECIMEN EXPIRATION DATE: NORMAL
SPECIMEN SOURCE: ABNORMAL
T WAVE AXIS: 29 DEGREES
T WAVE AXIS: 58 DEGREES
T WAVE AXIS: 61 DEGREES
T WAVE AXIS: 69 DEGREES
T WAVE AXIS: 69 DEGREES
T WAVE AXIS: 70 DEGREES
THC UR QL: NEGATIVE
UNIT DISPENSE STATUS: NORMAL
UNIT PRODUCT CODE: NORMAL
UNIT PRODUCT VOLUME: 300 ML
UNIT RH: NORMAL
UROBILINOGEN UR STRIP-ACNC: <2 MG/DL
VANCOMYCIN SERPL-MCNC: 22.5 UG/ML (ref 10–20)
VARIANT LYMPHS # BLD AUTO: 1 %
VARIANT LYMPHS # BLD AUTO: 2 %
VENT - PS: ABNORMAL
VENT - PS: ABNORMAL
VENTRICULAR RATE: 56 BPM
VENTRICULAR RATE: 63 BPM
VENTRICULAR RATE: 68 BPM
VENTRICULAR RATE: 81 BPM
VENTRICULAR RATE: 93 BPM
VENTRICULAR RATE: 96 BPM
WBC # BLD AUTO: 1.5 THOUSAND/UL (ref 4.31–10.16)
WBC # BLD AUTO: 3.06 THOUSAND/UL (ref 4.31–10.16)
WBC # BLD AUTO: 3.89 THOUSAND/UL (ref 4.31–10.16)
WBC # BLD AUTO: 4.32 THOUSAND/UL (ref 4.31–10.16)
WBC # BLD AUTO: 4.42 THOUSAND/UL (ref 4.31–10.16)
WBC # BLD AUTO: 4.77 THOUSAND/UL (ref 4.31–10.16)
WBC # BLD AUTO: 5.52 THOUSAND/UL (ref 4.31–10.16)
WBC #/AREA URNS AUTO: ABNORMAL /HPF

## 2024-01-01 PROCEDURE — 80048 BASIC METABOLIC PNL TOTAL CA: CPT

## 2024-01-01 PROCEDURE — 80202 ASSAY OF VANCOMYCIN: CPT | Performed by: EMERGENCY MEDICINE

## 2024-01-01 PROCEDURE — 82330 ASSAY OF CALCIUM: CPT

## 2024-01-01 PROCEDURE — 80053 COMPREHEN METABOLIC PANEL: CPT | Performed by: STUDENT IN AN ORGANIZED HEALTH CARE EDUCATION/TRAINING PROGRAM

## 2024-01-01 PROCEDURE — 85347 COAGULATION TIME ACTIVATED: CPT | Performed by: NURSE PRACTITIONER

## 2024-01-01 PROCEDURE — 83036 HEMOGLOBIN GLYCOSYLATED A1C: CPT | Performed by: NURSE PRACTITIONER

## 2024-01-01 PROCEDURE — 82948 REAGENT STRIP/BLOOD GLUCOSE: CPT

## 2024-01-01 PROCEDURE — 85049 AUTOMATED PLATELET COUNT: CPT | Performed by: NURSE PRACTITIONER

## 2024-01-01 PROCEDURE — 94003 VENT MGMT INPAT SUBQ DAY: CPT

## 2024-01-01 PROCEDURE — 96365 THER/PROPH/DIAG IV INF INIT: CPT

## 2024-01-01 PROCEDURE — 94760 N-INVAS EAR/PLS OXIMETRY 1: CPT

## 2024-01-01 PROCEDURE — 80048 BASIC METABOLIC PNL TOTAL CA: CPT | Performed by: NURSE PRACTITIONER

## 2024-01-01 PROCEDURE — 85397 CLOTTING FUNCT ACTIVITY: CPT

## 2024-01-01 PROCEDURE — 95715 VEEG EA 12-26HR INTMT MNTR: CPT

## 2024-01-01 PROCEDURE — 82947 ASSAY GLUCOSE BLOOD QUANT: CPT

## 2024-01-01 PROCEDURE — 85007 BL SMEAR W/DIFF WBC COUNT: CPT

## 2024-01-01 PROCEDURE — 87040 BLOOD CULTURE FOR BACTERIA: CPT | Performed by: REGISTERED NURSE

## 2024-01-01 PROCEDURE — 99291 CRITICAL CARE FIRST HOUR: CPT | Performed by: INTERNAL MEDICINE

## 2024-01-01 PROCEDURE — 95700 EEG CONT REC W/VID EEG TECH: CPT

## 2024-01-01 PROCEDURE — 80053 COMPREHEN METABOLIC PANEL: CPT

## 2024-01-01 PROCEDURE — 99291 CRITICAL CARE FIRST HOUR: CPT | Performed by: EMERGENCY MEDICINE

## 2024-01-01 PROCEDURE — 85576 BLOOD PLATELET AGGREGATION: CPT | Performed by: NURSE PRACTITIONER

## 2024-01-01 PROCEDURE — 99255 IP/OBS CONSLTJ NEW/EST HI 80: CPT | Performed by: NURSE PRACTITIONER

## 2024-01-01 PROCEDURE — 83930 ASSAY OF BLOOD OSMOLALITY: CPT | Performed by: NURSE PRACTITIONER

## 2024-01-01 PROCEDURE — 84145 PROCALCITONIN (PCT): CPT | Performed by: REGISTERED NURSE

## 2024-01-01 PROCEDURE — 94002 VENT MGMT INPAT INIT DAY: CPT

## 2024-01-01 PROCEDURE — 85025 COMPLETE CBC W/AUTO DIFF WBC: CPT

## 2024-01-01 PROCEDURE — 84100 ASSAY OF PHOSPHORUS: CPT

## 2024-01-01 PROCEDURE — 82140 ASSAY OF AMMONIA: CPT | Performed by: NURSE PRACTITIONER

## 2024-01-01 PROCEDURE — 71045 X-RAY EXAM CHEST 1 VIEW: CPT

## 2024-01-01 PROCEDURE — NC001 PR NO CHARGE: Performed by: REGISTERED NURSE

## 2024-01-01 PROCEDURE — 85025 COMPLETE CBC W/AUTO DIFF WBC: CPT | Performed by: NURSE PRACTITIONER

## 2024-01-01 PROCEDURE — 36415 COLL VENOUS BLD VENIPUNCTURE: CPT | Performed by: STUDENT IN AN ORGANIZED HEALTH CARE EDUCATION/TRAINING PROGRAM

## 2024-01-01 PROCEDURE — 93005 ELECTROCARDIOGRAM TRACING: CPT

## 2024-01-01 PROCEDURE — 86850 RBC ANTIBODY SCREEN: CPT | Performed by: NURSE PRACTITIONER

## 2024-01-01 PROCEDURE — 85384 FIBRINOGEN ACTIVITY: CPT | Performed by: NURSE PRACTITIONER

## 2024-01-01 PROCEDURE — 85347 COAGULATION TIME ACTIVATED: CPT | Performed by: STUDENT IN AN ORGANIZED HEALTH CARE EDUCATION/TRAINING PROGRAM

## 2024-01-01 PROCEDURE — 70450 CT HEAD/BRAIN W/O DYE: CPT

## 2024-01-01 PROCEDURE — 93010 ELECTROCARDIOGRAM REPORT: CPT | Performed by: STUDENT IN AN ORGANIZED HEALTH CARE EDUCATION/TRAINING PROGRAM

## 2024-01-01 PROCEDURE — 85049 AUTOMATED PLATELET COUNT: CPT

## 2024-01-01 PROCEDURE — 93010 ELECTROCARDIOGRAM REPORT: CPT | Performed by: INTERNAL MEDICINE

## 2024-01-01 PROCEDURE — 85027 COMPLETE CBC AUTOMATED: CPT | Performed by: REGISTERED NURSE

## 2024-01-01 PROCEDURE — 85610 PROTHROMBIN TIME: CPT | Performed by: NURSE PRACTITIONER

## 2024-01-01 PROCEDURE — 5A1955Z RESPIRATORY VENTILATION, GREATER THAN 96 CONSECUTIVE HOURS: ICD-10-PCS | Performed by: ANESTHESIOLOGY

## 2024-01-01 PROCEDURE — 87186 SC STD MICRODIL/AGAR DIL: CPT | Performed by: REGISTERED NURSE

## 2024-01-01 PROCEDURE — 85384 FIBRINOGEN ACTIVITY: CPT

## 2024-01-01 PROCEDURE — 87147 CULTURE TYPE IMMUNOLOGIC: CPT | Performed by: REGISTERED NURSE

## 2024-01-01 PROCEDURE — 85014 HEMATOCRIT: CPT

## 2024-01-01 PROCEDURE — 82805 BLOOD GASES W/O2 SATURATION: CPT | Performed by: EMERGENCY MEDICINE

## 2024-01-01 PROCEDURE — 86901 BLOOD TYPING SEROLOGIC RH(D): CPT | Performed by: NURSE PRACTITIONER

## 2024-01-01 PROCEDURE — 83735 ASSAY OF MAGNESIUM: CPT

## 2024-01-01 PROCEDURE — 94664 DEMO&/EVAL PT USE INHALER: CPT

## 2024-01-01 PROCEDURE — 96375 TX/PRO/DX INJ NEW DRUG ADDON: CPT

## 2024-01-01 PROCEDURE — 81001 URINALYSIS AUTO W/SCOPE: CPT | Performed by: STUDENT IN AN ORGANIZED HEALTH CARE EDUCATION/TRAINING PROGRAM

## 2024-01-01 PROCEDURE — P9037 PLATE PHERES LEUKOREDU IRRAD: HCPCS

## 2024-01-01 PROCEDURE — 36556 INSERT NON-TUNNEL CV CATH: CPT | Performed by: NURSE PRACTITIONER

## 2024-01-01 PROCEDURE — 85027 COMPLETE CBC AUTOMATED: CPT

## 2024-01-01 PROCEDURE — 36430 TRANSFUSION BLD/BLD COMPNT: CPT

## 2024-01-01 PROCEDURE — 84132 ASSAY OF SERUM POTASSIUM: CPT

## 2024-01-01 PROCEDURE — 83605 ASSAY OF LACTIC ACID: CPT | Performed by: REGISTERED NURSE

## 2024-01-01 PROCEDURE — 85347 COAGULATION TIME ACTIVATED: CPT

## 2024-01-01 PROCEDURE — 95720 EEG PHY/QHP EA INCR W/VEEG: CPT | Performed by: PSYCHIATRY & NEUROLOGY

## 2024-01-01 PROCEDURE — 36556 INSERT NON-TUNNEL CV CATH: CPT | Performed by: ANESTHESIOLOGY

## 2024-01-01 PROCEDURE — 82077 ASSAY SPEC XCP UR&BREATH IA: CPT | Performed by: STUDENT IN AN ORGANIZED HEALTH CARE EDUCATION/TRAINING PROGRAM

## 2024-01-01 PROCEDURE — 87205 SMEAR GRAM STAIN: CPT | Performed by: REGISTERED NURSE

## 2024-01-01 PROCEDURE — 96366 THER/PROPH/DIAG IV INF ADDON: CPT

## 2024-01-01 PROCEDURE — 99291 CRITICAL CARE FIRST HOUR: CPT | Performed by: ANESTHESIOLOGY

## 2024-01-01 PROCEDURE — 70498 CT ANGIOGRAPHY NECK: CPT

## 2024-01-01 PROCEDURE — 83735 ASSAY OF MAGNESIUM: CPT | Performed by: NURSE PRACTITIONER

## 2024-01-01 PROCEDURE — 83930 ASSAY OF BLOOD OSMOLALITY: CPT

## 2024-01-01 PROCEDURE — 02HV33Z INSERTION OF INFUSION DEVICE INTO SUPERIOR VENA CAVA, PERCUTANEOUS APPROACH: ICD-10-PCS | Performed by: ANESTHESIOLOGY

## 2024-01-01 PROCEDURE — 70496 CT ANGIOGRAPHY HEAD: CPT

## 2024-01-01 PROCEDURE — 80053 COMPREHEN METABOLIC PANEL: CPT | Performed by: NURSE PRACTITIONER

## 2024-01-01 PROCEDURE — 82805 BLOOD GASES W/O2 SATURATION: CPT | Performed by: NURSE PRACTITIONER

## 2024-01-01 PROCEDURE — NC001 PR NO CHARGE: Performed by: NURSE PRACTITIONER

## 2024-01-01 PROCEDURE — 82805 BLOOD GASES W/O2 SATURATION: CPT | Performed by: REGISTERED NURSE

## 2024-01-01 PROCEDURE — 99255 IP/OBS CONSLTJ NEW/EST HI 80: CPT

## 2024-01-01 PROCEDURE — 86900 BLOOD TYPING SEROLOGIC ABO: CPT | Performed by: NURSE PRACTITIONER

## 2024-01-01 PROCEDURE — 82803 BLOOD GASES ANY COMBINATION: CPT

## 2024-01-01 PROCEDURE — 84295 ASSAY OF SERUM SODIUM: CPT

## 2024-01-01 PROCEDURE — 84100 ASSAY OF PHOSPHORUS: CPT | Performed by: NURSE PRACTITIONER

## 2024-01-01 PROCEDURE — 85730 THROMBOPLASTIN TIME PARTIAL: CPT | Performed by: STUDENT IN AN ORGANIZED HEALTH CARE EDUCATION/TRAINING PROGRAM

## 2024-01-01 PROCEDURE — 82140 ASSAY OF AMMONIA: CPT | Performed by: EMERGENCY MEDICINE

## 2024-01-01 PROCEDURE — 82330 ASSAY OF CALCIUM: CPT | Performed by: NURSE PRACTITIONER

## 2024-01-01 PROCEDURE — 80307 DRUG TEST PRSMV CHEM ANLYZR: CPT | Performed by: STUDENT IN AN ORGANIZED HEALTH CARE EDUCATION/TRAINING PROGRAM

## 2024-01-01 PROCEDURE — 99285 EMERGENCY DEPT VISIT HI MDM: CPT

## 2024-01-01 PROCEDURE — 85576 BLOOD PLATELET AGGREGATION: CPT

## 2024-01-01 PROCEDURE — 85384 FIBRINOGEN ACTIVITY: CPT | Performed by: STUDENT IN AN ORGANIZED HEALTH CARE EDUCATION/TRAINING PROGRAM

## 2024-01-01 PROCEDURE — 85576 BLOOD PLATELET AGGREGATION: CPT | Performed by: STUDENT IN AN ORGANIZED HEALTH CARE EDUCATION/TRAINING PROGRAM

## 2024-01-01 PROCEDURE — 76937 US GUIDE VASCULAR ACCESS: CPT | Performed by: NURSE PRACTITIONER

## 2024-01-01 PROCEDURE — 96374 THER/PROPH/DIAG INJ IV PUSH: CPT

## 2024-01-01 PROCEDURE — 85007 BL SMEAR W/DIFF WBC COUNT: CPT | Performed by: REGISTERED NURSE

## 2024-01-01 PROCEDURE — 30233R1 TRANSFUSION OF NONAUTOLOGOUS PLATELETS INTO PERIPHERAL VEIN, PERCUTANEOUS APPROACH: ICD-10-PCS | Performed by: ANESTHESIOLOGY

## 2024-01-01 PROCEDURE — 87070 CULTURE OTHR SPECIMN AEROBIC: CPT | Performed by: REGISTERED NURSE

## 2024-01-01 PROCEDURE — 85397 CLOTTING FUNCT ACTIVITY: CPT | Performed by: STUDENT IN AN ORGANIZED HEALTH CARE EDUCATION/TRAINING PROGRAM

## 2024-01-01 PROCEDURE — 83605 ASSAY OF LACTIC ACID: CPT | Performed by: NURSE PRACTITIONER

## 2024-01-01 PROCEDURE — 85397 CLOTTING FUNCT ACTIVITY: CPT | Performed by: NURSE PRACTITIONER

## 2024-01-01 PROCEDURE — 85610 PROTHROMBIN TIME: CPT | Performed by: STUDENT IN AN ORGANIZED HEALTH CARE EDUCATION/TRAINING PROGRAM

## 2024-01-01 PROCEDURE — 99291 CRITICAL CARE FIRST HOUR: CPT | Performed by: PSYCHIATRY & NEUROLOGY

## 2024-01-01 RX ORDER — GLYCOPYRROLATE 0.2 MG/ML
0.4 INJECTION INTRAMUSCULAR; INTRAVENOUS
Status: DISCONTINUED | OUTPATIENT
Start: 2024-01-01 | End: 2024-09-16 | Stop reason: HOSPADM

## 2024-01-01 RX ORDER — SODIUM CHLORIDE, SODIUM GLUCONATE, SODIUM ACETATE, POTASSIUM CHLORIDE, MAGNESIUM CHLORIDE, SODIUM PHOSPHATE, DIBASIC, AND POTASSIUM PHOSPHATE .53; .5; .37; .037; .03; .012; .00082 G/100ML; G/100ML; G/100ML; G/100ML; G/100ML; G/100ML; G/100ML
1000 INJECTION, SOLUTION INTRAVENOUS ONCE
Status: COMPLETED | OUTPATIENT
Start: 2024-01-01 | End: 2024-01-01

## 2024-01-01 RX ORDER — DEXTROSE MONOHYDRATE AND SODIUM CHLORIDE 5; .45 G/100ML; G/100ML
50 INJECTION, SOLUTION INTRAVENOUS CONTINUOUS
Status: DISCONTINUED | OUTPATIENT
Start: 2024-01-01 | End: 2024-01-01

## 2024-01-01 RX ORDER — POTASSIUM CHLORIDE 20MEQ/15ML
40 LIQUID (ML) ORAL ONCE
Status: COMPLETED | OUTPATIENT
Start: 2024-01-01 | End: 2024-01-01

## 2024-01-01 RX ORDER — CALCIUM GLUCONATE 20 MG/ML
2 INJECTION, SOLUTION INTRAVENOUS ONCE
Status: DISCONTINUED | OUTPATIENT
Start: 2024-01-01 | End: 2024-01-01

## 2024-01-01 RX ORDER — NICARDIPINE HYDROCHLORIDE 2.5 MG/ML
INJECTION INTRAVENOUS
Status: DISPENSED
Start: 2024-01-01 | End: 2024-01-01

## 2024-01-01 RX ORDER — HYDROMORPHONE HCL/PF 1 MG/ML
1 SYRINGE (ML) INJECTION ONCE
Status: DISCONTINUED | OUTPATIENT
Start: 2024-01-01 | End: 2024-09-16 | Stop reason: HOSPADM

## 2024-01-01 RX ORDER — CALCIUM GLUCONATE 20 MG/ML
2 INJECTION, SOLUTION INTRAVENOUS ONCE
Status: COMPLETED | OUTPATIENT
Start: 2024-01-01 | End: 2024-01-01

## 2024-01-01 RX ORDER — NOREPINEPHRINE BITARTRATE 1 MG/ML
INJECTION, SOLUTION INTRAVENOUS
Status: DISPENSED
Start: 2024-01-01 | End: 2024-01-01

## 2024-01-01 RX ORDER — CALCIUM GLUCONATE 20 MG/ML
1 INJECTION, SOLUTION INTRAVENOUS ONCE
Status: COMPLETED | OUTPATIENT
Start: 2024-01-01 | End: 2024-01-01

## 2024-01-01 RX ORDER — ACETAMINOPHEN 10 MG/ML
1000 INJECTION, SOLUTION INTRAVENOUS ONCE
Status: COMPLETED | OUTPATIENT
Start: 2024-01-01 | End: 2024-01-01

## 2024-01-01 RX ORDER — LEVETIRACETAM 500 MG/5ML
2000 INJECTION, SOLUTION, CONCENTRATE INTRAVENOUS ONCE
Status: COMPLETED | OUTPATIENT
Start: 2024-01-01 | End: 2024-01-01

## 2024-01-01 RX ORDER — BROMOCRIPTINE MESYLATE 2.5 MG/1
5 TABLET ORAL EVERY 8 HOURS
Status: DISCONTINUED | OUTPATIENT
Start: 2024-01-01 | End: 2024-01-01

## 2024-01-01 RX ORDER — MAGNESIUM SULFATE HEPTAHYDRATE 40 MG/ML
4 INJECTION, SOLUTION INTRAVENOUS ONCE
Status: COMPLETED | OUTPATIENT
Start: 2024-01-01 | End: 2024-01-01

## 2024-01-01 RX ORDER — VANCOMYCIN HYDROCHLORIDE 750 MG/150ML
750 INJECTION, SOLUTION INTRAVENOUS EVERY 8 HOURS
Status: DISCONTINUED | OUTPATIENT
Start: 2024-01-01 | End: 2024-01-01 | Stop reason: DRUGHIGH

## 2024-01-01 RX ORDER — MAGNESIUM SULFATE HEPTAHYDRATE 40 MG/ML
2 INJECTION, SOLUTION INTRAVENOUS ONCE
Status: COMPLETED | OUTPATIENT
Start: 2024-01-01 | End: 2024-01-01

## 2024-01-01 RX ORDER — NALOXONE HYDROCHLORIDE 1 MG/ML
2 INJECTION PARENTERAL ONCE
Status: COMPLETED | OUTPATIENT
Start: 2024-01-01 | End: 2024-01-01

## 2024-01-01 RX ORDER — PANTOPRAZOLE SODIUM 40 MG/10ML
40 INJECTION, POWDER, LYOPHILIZED, FOR SOLUTION INTRAVENOUS EVERY 12 HOURS SCHEDULED
Status: DISCONTINUED | OUTPATIENT
Start: 2024-01-01 | End: 2024-01-01

## 2024-01-01 RX ORDER — FENTANYL CITRATE 50 UG/ML
INJECTION, SOLUTION INTRAMUSCULAR; INTRAVENOUS
Status: COMPLETED
Start: 2024-01-01 | End: 2024-01-01

## 2024-01-01 RX ORDER — TRANEXAMIC ACID 10 MG/ML
1000 INJECTION, SOLUTION INTRAVENOUS ONCE
Status: COMPLETED | OUTPATIENT
Start: 2024-01-01 | End: 2024-01-01

## 2024-01-01 RX ORDER — POTASSIUM CHLORIDE 29.8 MG/ML
40 INJECTION INTRAVENOUS ONCE
Status: COMPLETED | OUTPATIENT
Start: 2024-01-01 | End: 2024-01-01

## 2024-01-01 RX ORDER — CHLORHEXIDINE GLUCONATE ORAL RINSE 1.2 MG/ML
15 SOLUTION DENTAL EVERY 12 HOURS SCHEDULED
Status: DISCONTINUED | OUTPATIENT
Start: 2024-01-01 | End: 2024-01-01

## 2024-01-01 RX ORDER — FENTANYL CITRATE 50 UG/ML
50 INJECTION, SOLUTION INTRAMUSCULAR; INTRAVENOUS
Status: DISCONTINUED | OUTPATIENT
Start: 2024-01-01 | End: 2024-01-01

## 2024-01-01 RX ORDER — HALOPERIDOL 5 MG/ML
5 INJECTION INTRAMUSCULAR ONCE
Status: DISCONTINUED | OUTPATIENT
Start: 2024-01-01 | End: 2024-09-16 | Stop reason: HOSPADM

## 2024-01-01 RX ORDER — PROPOFOL 10 MG/ML
5-50 INJECTION, EMULSION INTRAVENOUS
Status: DISCONTINUED | OUTPATIENT
Start: 2024-01-01 | End: 2024-01-01

## 2024-01-01 RX ORDER — 3% SODIUM CHLORIDE 3 G/100ML
50 INJECTION, SOLUTION INTRAVENOUS CONTINUOUS
Status: DISCONTINUED | OUTPATIENT
Start: 2024-01-01 | End: 2024-01-01

## 2024-01-01 RX ORDER — FENTANYL CITRATE 50 UG/ML
50 INJECTION, SOLUTION INTRAMUSCULAR; INTRAVENOUS EVERY 2 HOUR PRN
Status: DISCONTINUED | OUTPATIENT
Start: 2024-01-01 | End: 2024-01-01

## 2024-01-01 RX ORDER — HYDRALAZINE HYDROCHLORIDE 20 MG/ML
10 INJECTION INTRAMUSCULAR; INTRAVENOUS EVERY 6 HOURS PRN
Status: DISCONTINUED | OUTPATIENT
Start: 2024-01-01 | End: 2024-01-01

## 2024-01-01 RX ORDER — ACETAMINOPHEN 160 MG/5ML
650 SUSPENSION ORAL EVERY 4 HOURS PRN
Status: DISCONTINUED | OUTPATIENT
Start: 2024-01-01 | End: 2024-09-16 | Stop reason: HOSPADM

## 2024-01-01 RX ORDER — HYDROMORPHONE HCL/PF 1 MG/ML
1 SYRINGE (ML) INJECTION EVERY 2 HOUR PRN
Status: DISCONTINUED | OUTPATIENT
Start: 2024-01-01 | End: 2024-09-16 | Stop reason: HOSPADM

## 2024-01-01 RX ORDER — ETOMIDATE 2 MG/ML
10 INJECTION INTRAVENOUS ONCE
Status: COMPLETED | OUTPATIENT
Start: 2024-01-01 | End: 2024-01-01

## 2024-01-01 RX ORDER — PROPOFOL 10 MG/ML
10 INJECTION, EMULSION INTRAVENOUS
Status: DISCONTINUED | OUTPATIENT
Start: 2024-01-01 | End: 2024-01-01 | Stop reason: HOSPADM

## 2024-01-01 RX ORDER — SUCCINYLCHOLINE/SOD CL,ISO/PF 100 MG/5ML
80 SYRINGE (ML) INTRAVENOUS ONCE
Status: COMPLETED | OUTPATIENT
Start: 2024-01-01 | End: 2024-01-01

## 2024-01-01 RX ORDER — LEVETIRACETAM 500 MG/5ML
750 INJECTION, SOLUTION, CONCENTRATE INTRAVENOUS EVERY 12 HOURS SCHEDULED
Status: DISCONTINUED | OUTPATIENT
Start: 2024-01-01 | End: 2024-09-16 | Stop reason: HOSPADM

## 2024-01-01 RX ORDER — LABETALOL HYDROCHLORIDE 5 MG/ML
10 INJECTION, SOLUTION INTRAVENOUS ONCE
Status: COMPLETED | OUTPATIENT
Start: 2024-01-01 | End: 2024-01-01

## 2024-01-01 RX ORDER — CALCIUM GLUCONATE 20 MG/ML
1 INJECTION, SOLUTION INTRAVENOUS ONCE
Status: DISCONTINUED | OUTPATIENT
Start: 2024-01-01 | End: 2024-01-01

## 2024-01-01 RX ORDER — FUROSEMIDE 10 MG/ML
20 INJECTION INTRAMUSCULAR; INTRAVENOUS ONCE
Status: COMPLETED | OUTPATIENT
Start: 2024-01-01 | End: 2024-01-01

## 2024-01-01 RX ORDER — HALOPERIDOL 5 MG/ML
5 INJECTION INTRAMUSCULAR EVERY 2 HOUR PRN
Status: DISCONTINUED | OUTPATIENT
Start: 2024-01-01 | End: 2024-09-16 | Stop reason: HOSPADM

## 2024-01-01 RX ORDER — FENTANYL CITRATE-0.9 % NACL/PF 10 MCG/ML
25 PLASTIC BAG, INJECTION (ML) INTRAVENOUS CONTINUOUS
Status: DISCONTINUED | OUTPATIENT
Start: 2024-01-01 | End: 2024-01-01 | Stop reason: HOSPADM

## 2024-01-01 RX ORDER — POTASSIUM CHLORIDE 1500 MG/1
40 TABLET, EXTENDED RELEASE ORAL ONCE
Status: DISCONTINUED | OUTPATIENT
Start: 2024-01-01 | End: 2024-01-01

## 2024-01-01 RX ORDER — VANCOMYCIN HYDROCHLORIDE 500 MG/100ML
500 INJECTION, SOLUTION INTRAVENOUS EVERY 8 HOURS
Status: DISCONTINUED | OUTPATIENT
Start: 2024-01-01 | End: 2024-01-01

## 2024-01-01 RX ORDER — LORAZEPAM 2 MG/ML
2 INJECTION INTRAMUSCULAR ONCE
Status: COMPLETED | OUTPATIENT
Start: 2024-01-01 | End: 2024-01-01

## 2024-01-01 RX ORDER — SODIUM CHLORIDE 3 G/100ML
250 INJECTION, SOLUTION INTRAVENOUS ONCE
Status: COMPLETED | OUTPATIENT
Start: 2024-01-01 | End: 2024-01-01

## 2024-01-01 RX ORDER — INSULIN LISPRO 100 [IU]/ML
1-5 INJECTION, SOLUTION INTRAVENOUS; SUBCUTANEOUS EVERY 6 HOURS SCHEDULED
Status: DISCONTINUED | OUTPATIENT
Start: 2024-01-01 | End: 2024-01-01

## 2024-01-01 RX ORDER — LABETALOL HYDROCHLORIDE 5 MG/ML
10 INJECTION, SOLUTION INTRAVENOUS EVERY 4 HOURS PRN
Status: DISCONTINUED | OUTPATIENT
Start: 2024-01-01 | End: 2024-01-01

## 2024-01-01 RX ORDER — ALBUMIN, HUMAN INJ 5% 5 %
25 SOLUTION INTRAVENOUS ONCE
Status: COMPLETED | OUTPATIENT
Start: 2024-01-01 | End: 2024-01-01

## 2024-01-01 RX ORDER — LACTULOSE 10 G/15ML
20 SOLUTION ORAL 3 TIMES DAILY
Status: DISCONTINUED | OUTPATIENT
Start: 2024-01-01 | End: 2024-01-01

## 2024-01-01 RX ORDER — HYDROMORPHONE HCL/PF 1 MG/ML
1 SYRINGE (ML) INJECTION ONCE
Status: COMPLETED | OUTPATIENT
Start: 2024-01-01 | End: 2024-01-01

## 2024-01-01 RX ORDER — DEXTROSE MONOHYDRATE 50 MG/ML
50 INJECTION, SOLUTION INTRAVENOUS CONTINUOUS
Status: DISCONTINUED | OUTPATIENT
Start: 2024-01-01 | End: 2024-01-01

## 2024-01-01 RX ORDER — FENTANYL CITRATE 50 UG/ML
100 INJECTION, SOLUTION INTRAMUSCULAR; INTRAVENOUS ONCE
Status: COMPLETED | OUTPATIENT
Start: 2024-01-01 | End: 2024-01-01

## 2024-01-01 RX ADMIN — LACTULOSE 20 G: 20 SOLUTION ORAL at 15:07

## 2024-01-01 RX ADMIN — HALOPERIDOL LACTATE 5 MG: 5 INJECTION, SOLUTION INTRAMUSCULAR at 19:50

## 2024-01-01 RX ADMIN — FENTANYL CITRATE 50 MCG: 50 INJECTION INTRAMUSCULAR; INTRAVENOUS at 18:10

## 2024-01-01 RX ADMIN — CHLORHEXIDINE GLUCONATE 0.12% ORAL RINSE 15 ML: 1.2 LIQUID ORAL at 08:13

## 2024-01-01 RX ADMIN — MAGNESIUM SULFATE HEPTAHYDRATE 2 G: 40 INJECTION, SOLUTION INTRAVENOUS at 10:47

## 2024-01-01 RX ADMIN — VANCOMYCIN HYDROCHLORIDE 750 MG: 750 INJECTION, SOLUTION INTRAVENOUS at 15:36

## 2024-01-01 RX ADMIN — VANCOMYCIN HYDROCHLORIDE 750 MG: 750 INJECTION, SOLUTION INTRAVENOUS at 22:22

## 2024-01-01 RX ADMIN — SODIUM CHLORIDE 500 ML: 0.9 INJECTION, SOLUTION INTRAVENOUS at 15:42

## 2024-01-01 RX ADMIN — NOREPINEPHRINE BITARTRATE 10 MCG/MIN: 1 INJECTION, SOLUTION, CONCENTRATE INTRAVENOUS at 06:09

## 2024-01-01 RX ADMIN — LACTULOSE 20 G: 20 SOLUTION ORAL at 08:16

## 2024-01-01 RX ADMIN — INSULIN LISPRO 1 UNITS: 100 INJECTION, SOLUTION INTRAVENOUS; SUBCUTANEOUS at 23:58

## 2024-01-01 RX ADMIN — SODIUM CHLORIDE, SODIUM GLUCONATE, SODIUM ACETATE, POTASSIUM CHLORIDE, MAGNESIUM CHLORIDE, SODIUM PHOSPHATE, DIBASIC, AND POTASSIUM PHOSPHATE 1000 ML: .53; .5; .37; .037; .03; .012; .00082 INJECTION, SOLUTION INTRAVENOUS at 23:15

## 2024-01-01 RX ADMIN — LACTULOSE 20 G: 20 SOLUTION ORAL at 15:41

## 2024-01-01 RX ADMIN — SODIUM CHLORIDE 250 ML: 3 INJECTION, SOLUTION INTRAVENOUS at 03:34

## 2024-01-01 RX ADMIN — CHLORHEXIDINE GLUCONATE 0.12% ORAL RINSE 15 ML: 1.2 LIQUID ORAL at 03:34

## 2024-01-01 RX ADMIN — FENTANYL CITRATE 50 MCG: 50 INJECTION INTRAMUSCULAR; INTRAVENOUS at 20:42

## 2024-01-01 RX ADMIN — INSULIN LISPRO 3 UNITS: 100 INJECTION, SOLUTION INTRAVENOUS; SUBCUTANEOUS at 17:08

## 2024-01-01 RX ADMIN — CEFEPIME 2000 MG: 2 INJECTION, POWDER, FOR SOLUTION INTRAVENOUS at 06:13

## 2024-01-01 RX ADMIN — LACTULOSE 20 G: 20 SOLUTION ORAL at 08:26

## 2024-01-01 RX ADMIN — FENTANYL CITRATE 50 MCG: 50 INJECTION INTRAMUSCULAR; INTRAVENOUS at 01:51

## 2024-01-01 RX ADMIN — MAGNESIUM SULFATE HEPTAHYDRATE 2 G: 40 INJECTION, SOLUTION INTRAVENOUS at 11:23

## 2024-01-01 RX ADMIN — BROMOCRIPTINE MESYLATE 5 MG: 2.5 TABLET ORAL at 06:54

## 2024-01-01 RX ADMIN — FENTANYL CITRATE 100 MCG: 50 INJECTION INTRAMUSCULAR; INTRAVENOUS at 00:20

## 2024-01-01 RX ADMIN — POTASSIUM CHLORIDE 40 MEQ: 29.8 INJECTION, SOLUTION INTRAVENOUS at 06:15

## 2024-01-01 RX ADMIN — GLYCOPYRROLATE 0.4 MG: 0.2 INJECTION, SOLUTION INTRAMUSCULAR; INTRAVENOUS at 19:50

## 2024-01-01 RX ADMIN — LEVETIRACETAM 750 MG: 100 INJECTION, SOLUTION INTRAVENOUS at 08:19

## 2024-01-01 RX ADMIN — INSULIN LISPRO 2 UNITS: 100 INJECTION, SOLUTION INTRAVENOUS; SUBCUTANEOUS at 13:08

## 2024-01-01 RX ADMIN — LACTULOSE 20 G: 20 SOLUTION ORAL at 08:01

## 2024-01-01 RX ADMIN — SODIUM CHLORIDE 50 ML/HR: 3 INJECTION, SOLUTION INTRAVENOUS at 08:11

## 2024-01-01 RX ADMIN — LACTULOSE 20 G: 20 SOLUTION ORAL at 20:17

## 2024-01-01 RX ADMIN — LACTULOSE 20 G: 20 SOLUTION ORAL at 20:33

## 2024-01-01 RX ADMIN — ACETAMINOPHEN 650 MG: 650 SUSPENSION ORAL at 12:31

## 2024-01-01 RX ADMIN — POTASSIUM PHOSPHATE, MONOBASIC POTASSIUM PHOSPHATE, DIBASIC 30 MMOL: 224; 236 INJECTION, SOLUTION, CONCENTRATE INTRAVENOUS at 09:09

## 2024-01-01 RX ADMIN — LEVETIRACETAM 750 MG: 100 INJECTION, SOLUTION INTRAVENOUS at 20:31

## 2024-01-01 RX ADMIN — CHLORHEXIDINE GLUCONATE 0.12% ORAL RINSE 15 ML: 1.2 LIQUID ORAL at 08:16

## 2024-01-01 RX ADMIN — FENTANYL CITRATE 50 MCG: 50 INJECTION INTRAMUSCULAR; INTRAVENOUS at 21:07

## 2024-01-01 RX ADMIN — INSULIN LISPRO 1 UNITS: 100 INJECTION, SOLUTION INTRAVENOUS; SUBCUTANEOUS at 06:16

## 2024-01-01 RX ADMIN — INSULIN LISPRO 1 UNITS: 100 INJECTION, SOLUTION INTRAVENOUS; SUBCUTANEOUS at 19:00

## 2024-01-01 RX ADMIN — Medication 25 MCG/HR: at 00:16

## 2024-01-01 RX ADMIN — CALCIUM GLUCONATE 2 G: 20 INJECTION, SOLUTION INTRAVENOUS at 11:23

## 2024-01-01 RX ADMIN — Medication 80 MG: at 00:08

## 2024-01-01 RX ADMIN — Medication 20 MG: at 08:17

## 2024-01-01 RX ADMIN — Medication 20 MG: at 08:26

## 2024-01-01 RX ADMIN — CHLORHEXIDINE GLUCONATE 0.12% ORAL RINSE 15 ML: 1.2 LIQUID ORAL at 20:17

## 2024-01-01 RX ADMIN — TRANEXAMIC ACID 1000 MG: 10 INJECTION, SOLUTION INTRAVENOUS at 01:17

## 2024-01-01 RX ADMIN — INSULIN LISPRO 1 UNITS: 100 INJECTION, SOLUTION INTRAVENOUS; SUBCUTANEOUS at 12:32

## 2024-01-01 RX ADMIN — FENTANYL CITRATE 50 MCG: 50 INJECTION INTRAMUSCULAR; INTRAVENOUS at 00:42

## 2024-01-01 RX ADMIN — INSULIN LISPRO 1 UNITS: 100 INJECTION, SOLUTION INTRAVENOUS; SUBCUTANEOUS at 18:07

## 2024-01-01 RX ADMIN — MAGNESIUM SULFATE HEPTAHYDRATE 2 G: 40 INJECTION, SOLUTION INTRAVENOUS at 23:45

## 2024-01-01 RX ADMIN — PROPOFOL 10 MCG/KG/MIN: 10 INJECTION, EMULSION INTRAVENOUS at 00:13

## 2024-01-01 RX ADMIN — ALBUMIN (HUMAN) 25 G: 12.5 INJECTION, SOLUTION INTRAVENOUS at 10:37

## 2024-01-01 RX ADMIN — LACTULOSE 20 G: 20 SOLUTION ORAL at 08:46

## 2024-01-01 RX ADMIN — BROMOCRIPTINE MESYLATE 5 MG: 2.5 TABLET ORAL at 12:30

## 2024-01-01 RX ADMIN — HYDRALAZINE HYDROCHLORIDE 10 MG: 20 INJECTION, SOLUTION INTRAMUSCULAR; INTRAVENOUS at 10:48

## 2024-01-01 RX ADMIN — FUROSEMIDE 20 MG: 10 INJECTION, SOLUTION INTRAMUSCULAR; INTRAVENOUS at 20:30

## 2024-01-01 RX ADMIN — ACETAMINOPHEN 650 MG: 650 SUSPENSION ORAL at 21:17

## 2024-01-01 RX ADMIN — FENTANYL CITRATE 50 MCG: 50 INJECTION INTRAMUSCULAR; INTRAVENOUS at 12:25

## 2024-01-01 RX ADMIN — CEFEPIME 2000 MG: 2 INJECTION, POWDER, FOR SOLUTION INTRAVENOUS at 15:07

## 2024-01-01 RX ADMIN — CHLORHEXIDINE GLUCONATE 0.12% ORAL RINSE 15 ML: 1.2 LIQUID ORAL at 08:53

## 2024-01-01 RX ADMIN — CALCIUM GLUCONATE 2 G: 20 INJECTION, SOLUTION INTRAVENOUS at 12:31

## 2024-01-01 RX ADMIN — DEXTROSE 1000 ML: 5 SOLUTION INTRAVENOUS at 17:06

## 2024-01-01 RX ADMIN — MAGNESIUM SULFATE HEPTAHYDRATE 2 G: 40 INJECTION, SOLUTION INTRAVENOUS at 05:49

## 2024-01-01 RX ADMIN — INSULIN LISPRO 1 UNITS: 100 INJECTION, SOLUTION INTRAVENOUS; SUBCUTANEOUS at 00:11

## 2024-01-01 RX ADMIN — NOREPINEPHRINE BITARTRATE 2 MCG/MIN: 1 INJECTION, SOLUTION, CONCENTRATE INTRAVENOUS at 10:37

## 2024-01-01 RX ADMIN — DESMOPRESSIN ACETATE 25.6 MCG: 4 SOLUTION INTRAVENOUS at 04:10

## 2024-01-01 RX ADMIN — LACTULOSE 20 G: 20 SOLUTION ORAL at 20:30

## 2024-01-01 RX ADMIN — LEVETIRACETAM 750 MG: 100 INJECTION, SOLUTION INTRAVENOUS at 08:16

## 2024-01-01 RX ADMIN — FENTANYL CITRATE 50 MCG: 50 INJECTION INTRAMUSCULAR; INTRAVENOUS at 02:55

## 2024-01-01 RX ADMIN — IOHEXOL 85 ML: 350 INJECTION, SOLUTION INTRAVENOUS at 01:06

## 2024-01-01 RX ADMIN — VANCOMYCIN HYDROCHLORIDE 750 MG: 750 INJECTION, SOLUTION INTRAVENOUS at 07:20

## 2024-01-01 RX ADMIN — ACETAMINOPHEN 650 MG: 650 SUSPENSION ORAL at 00:43

## 2024-01-01 RX ADMIN — INSULIN LISPRO 1 UNITS: 100 INJECTION, SOLUTION INTRAVENOUS; SUBCUTANEOUS at 18:09

## 2024-01-01 RX ADMIN — DEXTROSE 50 ML/HR: 5 SOLUTION INTRAVENOUS at 12:41

## 2024-01-01 RX ADMIN — Medication 20 MG: at 08:19

## 2024-01-01 RX ADMIN — ETOMIDATE 10 MG: 2 INJECTION, SOLUTION INTRAVENOUS at 00:07

## 2024-01-01 RX ADMIN — CHLORHEXIDINE GLUCONATE 0.12% ORAL RINSE 15 ML: 1.2 LIQUID ORAL at 08:26

## 2024-01-01 RX ADMIN — DEXTROSE 250 ML: 5 SOLUTION INTRAVENOUS at 21:35

## 2024-01-01 RX ADMIN — LACTULOSE 20 G: 20 SOLUTION ORAL at 15:16

## 2024-01-01 RX ADMIN — LACTULOSE 20 G: 20 SOLUTION ORAL at 21:06

## 2024-01-01 RX ADMIN — LEVETIRACETAM 750 MG: 100 INJECTION, SOLUTION INTRAVENOUS at 08:01

## 2024-01-01 RX ADMIN — Medication 20 MG: at 08:01

## 2024-01-01 RX ADMIN — LEVETIRACETAM 2000 MG: 100 INJECTION, SOLUTION INTRAVENOUS at 10:45

## 2024-01-01 RX ADMIN — FENTANYL CITRATE 50 MCG: 50 INJECTION INTRAMUSCULAR; INTRAVENOUS at 12:31

## 2024-01-01 RX ADMIN — Medication 1500 MG: at 00:10

## 2024-01-01 RX ADMIN — CALCIUM GLUCONATE 1 G: 20 INJECTION, SOLUTION INTRAVENOUS at 07:30

## 2024-01-01 RX ADMIN — LACTULOSE 20 G: 20 SOLUTION ORAL at 15:47

## 2024-01-01 RX ADMIN — LACTULOSE 20 G: 20 SOLUTION ORAL at 17:52

## 2024-01-01 RX ADMIN — LACTULOSE 20 G: 20 SOLUTION ORAL at 16:16

## 2024-01-01 RX ADMIN — CHLORHEXIDINE GLUCONATE 0.12% ORAL RINSE 15 ML: 1.2 LIQUID ORAL at 20:31

## 2024-01-01 RX ADMIN — ACETAMINOPHEN 650 MG: 650 SUSPENSION ORAL at 21:02

## 2024-01-01 RX ADMIN — ACETAMINOPHEN 650 MG: 650 SUSPENSION ORAL at 10:50

## 2024-01-01 RX ADMIN — INSULIN LISPRO 1 UNITS: 100 INJECTION, SOLUTION INTRAVENOUS; SUBCUTANEOUS at 00:10

## 2024-01-01 RX ADMIN — SODIUM CHLORIDE 500 ML: 0.9 INJECTION, SOLUTION INTRAVENOUS at 16:45

## 2024-01-01 RX ADMIN — NOREPINEPHRINE BITARTRATE 16 MCG/MIN: 1 INJECTION, SOLUTION, CONCENTRATE INTRAVENOUS at 12:33

## 2024-01-01 RX ADMIN — PANTOPRAZOLE SODIUM 40 MG: 40 INJECTION, POWDER, FOR SOLUTION INTRAVENOUS at 08:46

## 2024-01-01 RX ADMIN — CEFEPIME 2000 MG: 2 INJECTION, POWDER, FOR SOLUTION INTRAVENOUS at 07:15

## 2024-01-01 RX ADMIN — LACTULOSE 20 G: 20 SOLUTION ORAL at 20:32

## 2024-01-01 RX ADMIN — INSULIN LISPRO 1 UNITS: 100 INJECTION, SOLUTION INTRAVENOUS; SUBCUTANEOUS at 12:44

## 2024-01-01 RX ADMIN — POTASSIUM PHOSPHATE, MONOBASIC POTASSIUM PHOSPHATE, DIBASIC 12 MMOL: 224; 236 INJECTION, SOLUTION, CONCENTRATE INTRAVENOUS at 09:08

## 2024-01-01 RX ADMIN — CHLORHEXIDINE GLUCONATE 0.12% ORAL RINSE 15 ML: 1.2 LIQUID ORAL at 08:01

## 2024-01-01 RX ADMIN — LACTULOSE 20 G: 20 SOLUTION ORAL at 08:19

## 2024-01-01 RX ADMIN — LORAZEPAM 2 MG: 2 INJECTION INTRAMUSCULAR; INTRAVENOUS at 20:09

## 2024-01-01 RX ADMIN — CEFEPIME 2000 MG: 2 INJECTION, POWDER, FOR SOLUTION INTRAVENOUS at 23:25

## 2024-01-01 RX ADMIN — MAGNESIUM SULFATE HEPTAHYDRATE 4 G: 40 INJECTION, SOLUTION INTRAVENOUS at 08:05

## 2024-01-01 RX ADMIN — CEFEPIME 2000 MG: 2 INJECTION, POWDER, FOR SOLUTION INTRAVENOUS at 23:31

## 2024-01-01 RX ADMIN — INSULIN LISPRO 2 UNITS: 100 INJECTION, SOLUTION INTRAVENOUS; SUBCUTANEOUS at 12:20

## 2024-01-01 RX ADMIN — NOREPINEPHRINE BITARTRATE 3 MCG/MIN: 1 INJECTION, SOLUTION, CONCENTRATE INTRAVENOUS at 03:22

## 2024-01-01 RX ADMIN — CHLORHEXIDINE GLUCONATE 0.12% ORAL RINSE 15 ML: 1.2 LIQUID ORAL at 21:07

## 2024-01-01 RX ADMIN — HYDROMORPHONE HYDROCHLORIDE 1 MG: 1 INJECTION, SOLUTION INTRAMUSCULAR; INTRAVENOUS; SUBCUTANEOUS at 19:50

## 2024-01-01 RX ADMIN — CHLORHEXIDINE GLUCONATE 0.12% ORAL RINSE 15 ML: 1.2 LIQUID ORAL at 20:32

## 2024-01-01 RX ADMIN — FENTANYL CITRATE 50 MCG: 50 INJECTION INTRAMUSCULAR; INTRAVENOUS at 01:25

## 2024-01-01 RX ADMIN — INSULIN LISPRO 1 UNITS: 100 INJECTION, SOLUTION INTRAVENOUS; SUBCUTANEOUS at 06:27

## 2024-01-01 RX ADMIN — SODIUM CHLORIDE 1000 ML: 0.9 INJECTION, SOLUTION INTRAVENOUS at 12:41

## 2024-01-01 RX ADMIN — NOREPINEPHRINE BITARTRATE 18 MCG/MIN: 1 INJECTION, SOLUTION, CONCENTRATE INTRAVENOUS at 08:12

## 2024-01-01 RX ADMIN — POTASSIUM CHLORIDE 40 MEQ: 1.5 SOLUTION ORAL at 06:16

## 2024-01-01 RX ADMIN — INSULIN LISPRO 2 UNITS: 100 INJECTION, SOLUTION INTRAVENOUS; SUBCUTANEOUS at 00:30

## 2024-01-01 RX ADMIN — HYDROMORPHONE HYDROCHLORIDE 1 MG: 1 INJECTION, SOLUTION INTRAMUSCULAR; INTRAVENOUS; SUBCUTANEOUS at 20:11

## 2024-01-01 RX ADMIN — PROPOFOL 30 MCG/KG/MIN: 10 INJECTION, EMULSION INTRAVENOUS at 02:32

## 2024-01-01 RX ADMIN — LABETALOL HYDROCHLORIDE 10 MG: 5 INJECTION, SOLUTION INTRAVENOUS at 20:22

## 2024-01-01 RX ADMIN — INSULIN LISPRO 1 UNITS: 100 INJECTION, SOLUTION INTRAVENOUS; SUBCUTANEOUS at 06:32

## 2024-01-01 RX ADMIN — LEVETIRACETAM 750 MG: 100 INJECTION, SOLUTION INTRAVENOUS at 20:32

## 2024-01-01 RX ADMIN — ACETAMINOPHEN 650 MG: 650 SUSPENSION ORAL at 20:33

## 2024-01-01 RX ADMIN — VANCOMYCIN HYDROCHLORIDE 750 MG: 750 INJECTION, SOLUTION INTRAVENOUS at 06:15

## 2024-01-01 RX ADMIN — CEFTRIAXONE SODIUM 1000 MG: 10 INJECTION, POWDER, FOR SOLUTION INTRAVENOUS at 11:05

## 2024-01-01 RX ADMIN — LEVETIRACETAM 750 MG: 100 INJECTION, SOLUTION INTRAVENOUS at 08:26

## 2024-01-01 RX ADMIN — LEVETIRACETAM 750 MG: 100 INJECTION, SOLUTION INTRAVENOUS at 21:07

## 2024-01-01 RX ADMIN — Medication 20 MG: at 08:13

## 2024-01-01 RX ADMIN — SODIUM CHLORIDE, SODIUM GLUCONATE, SODIUM ACETATE, POTASSIUM CHLORIDE, MAGNESIUM CHLORIDE, SODIUM PHOSPHATE, DIBASIC, AND POTASSIUM PHOSPHATE 1000 ML: .53; .5; .37; .037; .03; .012; .00082 INJECTION, SOLUTION INTRAVENOUS at 00:51

## 2024-01-01 RX ADMIN — SODIUM CHLORIDE 50 ML/HR: 3 INJECTION, SOLUTION INTRAVENOUS at 18:02

## 2024-01-01 RX ADMIN — POTASSIUM CHLORIDE 40 MEQ: 1.5 SOLUTION ORAL at 11:05

## 2024-01-01 RX ADMIN — NOREPINEPHRINE BITARTRATE 4 MCG/MIN: 1 INJECTION, SOLUTION, CONCENTRATE INTRAVENOUS at 09:00

## 2024-01-01 RX ADMIN — POTASSIUM PHOSPHATE, MONOBASIC POTASSIUM PHOSPHATE, DIBASIC 30 MMOL: 224; 236 INJECTION, SOLUTION, CONCENTRATE INTRAVENOUS at 16:16

## 2024-01-01 RX ADMIN — INSULIN LISPRO 1 UNITS: 100 INJECTION, SOLUTION INTRAVENOUS; SUBCUTANEOUS at 12:00

## 2024-01-01 RX ADMIN — FENTANYL CITRATE 100 MCG: 50 INJECTION, SOLUTION INTRAMUSCULAR; INTRAVENOUS at 00:20

## 2024-01-01 RX ADMIN — FENTANYL CITRATE 50 MCG: 50 INJECTION INTRAMUSCULAR; INTRAVENOUS at 17:04

## 2024-01-01 RX ADMIN — POTASSIUM CHLORIDE 40 MEQ: 1.5 SOLUTION ORAL at 05:51

## 2024-01-01 RX ADMIN — LABETALOL HYDROCHLORIDE 10 MG: 5 INJECTION, SOLUTION INTRAVENOUS at 20:48

## 2024-01-01 RX ADMIN — FENTANYL CITRATE 50 MCG: 50 INJECTION INTRAMUSCULAR; INTRAVENOUS at 22:27

## 2024-01-01 RX ADMIN — LABETALOL HYDROCHLORIDE 10 MG: 5 INJECTION, SOLUTION INTRAVENOUS at 05:51

## 2024-01-01 RX ADMIN — DEXTROSE 50 ML/HR: 5 SOLUTION INTRAVENOUS at 17:06

## 2024-01-01 RX ADMIN — LEVETIRACETAM 750 MG: 100 INJECTION, SOLUTION INTRAVENOUS at 20:17

## 2024-01-01 RX ADMIN — ACETAMINOPHEN 650 MG: 650 SUSPENSION ORAL at 09:29

## 2024-01-01 RX ADMIN — CALCIUM GLUCONATE 1 G: 20 INJECTION, SOLUTION INTRAVENOUS at 07:15

## 2024-01-01 RX ADMIN — MAGNESIUM SULFATE HEPTAHYDRATE 4 G: 40 INJECTION, SOLUTION INTRAVENOUS at 08:07

## 2024-01-01 RX ADMIN — CHLORHEXIDINE GLUCONATE 0.12% ORAL RINSE 15 ML: 1.2 LIQUID ORAL at 20:33

## 2024-01-01 RX ADMIN — FENTANYL CITRATE 50 MCG: 50 INJECTION INTRAMUSCULAR; INTRAVENOUS at 19:04

## 2024-01-01 RX ADMIN — FENTANYL CITRATE 50 MCG: 50 INJECTION INTRAMUSCULAR; INTRAVENOUS at 11:42

## 2024-01-01 RX ADMIN — CHLORHEXIDINE GLUCONATE 0.12% ORAL RINSE 15 ML: 1.2 LIQUID ORAL at 08:46

## 2024-01-01 RX ADMIN — LACTULOSE 20 G: 20 SOLUTION ORAL at 08:13

## 2024-01-01 RX ADMIN — FENTANYL CITRATE 50 MCG: 50 INJECTION INTRAMUSCULAR; INTRAVENOUS at 22:22

## 2024-01-01 RX ADMIN — POTASSIUM PHOSPHATE, MONOBASIC POTASSIUM PHOSPHATE, DIBASIC 30 MMOL: 224; 236 INJECTION, SOLUTION, CONCENTRATE INTRAVENOUS at 11:23

## 2024-01-01 RX ADMIN — POTASSIUM CHLORIDE 40 MEQ: 29.8 INJECTION, SOLUTION INTRAVENOUS at 08:19

## 2024-01-01 RX ADMIN — ACETAMINOPHEN 650 MG: 650 SUSPENSION ORAL at 11:25

## 2024-01-01 RX ADMIN — LEVETIRACETAM 750 MG: 100 INJECTION, SOLUTION INTRAVENOUS at 08:13

## 2024-01-01 RX ADMIN — FENTANYL CITRATE 50 MCG: 50 INJECTION INTRAMUSCULAR; INTRAVENOUS at 04:42

## 2024-01-01 RX ADMIN — CEFEPIME 2000 MG: 2 INJECTION, POWDER, FOR SOLUTION INTRAVENOUS at 15:16

## 2024-01-01 RX ADMIN — LEVETIRACETAM 750 MG: 100 INJECTION, SOLUTION INTRAVENOUS at 20:34

## 2024-01-01 RX ADMIN — ACETAMINOPHEN 1000 MG: 10 INJECTION INTRAVENOUS at 01:44

## 2024-01-02 ENCOUNTER — OFFICE VISIT (OUTPATIENT)
Dept: HEMATOLOGY ONCOLOGY | Facility: CLINIC | Age: 44
End: 2024-01-02
Payer: COMMERCIAL

## 2024-01-02 VITALS
TEMPERATURE: 98.8 F | BODY MASS INDEX: 23.99 KG/M2 | DIASTOLIC BLOOD PRESSURE: 64 MMHG | WEIGHT: 144 LBS | HEART RATE: 127 BPM | HEIGHT: 65 IN | SYSTOLIC BLOOD PRESSURE: 122 MMHG | OXYGEN SATURATION: 93 % | RESPIRATION RATE: 18 BRPM

## 2024-01-02 DIAGNOSIS — M25.472 EDEMA OF BOTH ANKLES: ICD-10-CM

## 2024-01-02 DIAGNOSIS — R39.89 URINE DISCOLORATION: ICD-10-CM

## 2024-01-02 DIAGNOSIS — R05.3 CHRONIC COUGH: ICD-10-CM

## 2024-01-02 DIAGNOSIS — R16.1 SPLENOMEGALY: ICD-10-CM

## 2024-01-02 DIAGNOSIS — R06.02 SHORTNESS OF BREATH: ICD-10-CM

## 2024-01-02 DIAGNOSIS — D69.9 BLEEDING DISORDER (HCC): ICD-10-CM

## 2024-01-02 DIAGNOSIS — D69.6 THROMBOCYTOPENIA (HCC): ICD-10-CM

## 2024-01-02 DIAGNOSIS — E11.9 TYPE 2 DIABETES MELLITUS WITHOUT COMPLICATION, WITHOUT LONG-TERM CURRENT USE OF INSULIN (HCC): ICD-10-CM

## 2024-01-02 DIAGNOSIS — K74.60 CIRRHOSIS OF LIVER WITHOUT ASCITES, UNSPECIFIED HEPATIC CIRRHOSIS TYPE (HCC): Primary | ICD-10-CM

## 2024-01-02 DIAGNOSIS — M25.471 EDEMA OF BOTH ANKLES: ICD-10-CM

## 2024-01-02 DIAGNOSIS — E78.5 DYSLIPIDEMIA: ICD-10-CM

## 2024-01-02 PROCEDURE — 99244 OFF/OP CNSLTJ NEW/EST MOD 40: CPT | Performed by: INTERNAL MEDICINE

## 2024-01-02 NOTE — PATIENT INSTRUCTIONS
Ordered blood work, urine test and chest x-ray.  Please try to obtain records from Dr. [facility] in [place].  Also please try to get records from patient's primary physician [facility] primary care Dr. Maicol Morris.  Patient will use saline nasal drops to keep nasal cavities moist.  He will let me know instructions he received from oral surgeon for tooth extraction.  Follow-up in 3 weeks.

## 2024-01-03 NOTE — PROGRESS NOTES
Consultation - Medical Oncology   Sid Noel 43 y.o. male MRN: [id number]  Unit/Bed#:  Encounter: [id number]    Referring physician: Dr. Arsenio Pyle  Date of service: 1/2/2024.  Reason for Consult: Thrombocytopenia.  Patient has cirrhosis of liver with splenomegaly  HPI: Sid Noel is a 43 y.o. year old male.  Patient states he has low platelet count for a while and had bone marrow test at [facility] that was read at [facility].  He has reports at home and he is going to upload that into my chart.  I checked the records at [facility] but there is no bone marrow report.  Occasionally he has small nosebleed during winter months.  He has chronic cough and exertional dyspnea.  He gives history of yellowish to reddish urine color occasionally.  He states last year he had sepsis and he had lost weight but now weight is stable.  Occasionally swelling of the ankles.  He wants to gain some with his weight back.  History of diabetes mellitus and dyslipidemia.  No history of smoking and drinking.  He is a   Patient could be going for tooth extraction.  He has not seen oral surgeon yet.  Copied from records of Dr. Arsenio Pyle  cirrhosis secondary to Nonalcoholic steatohepatitis and Auto immune hepatitis which is complicated with thrombocytopenia, and LE edema.   Problem List and Plan:     Cirrhosis: Most MELD 3.0 Score is 11.  Etiology is not clear, however he has risk for KRUGER related cirrhosis and has positive autoimmune studies (ELIGIO and elevated IgG).  Liver biopsy would help determine etiology, however will need to address thrombocytopenia first.  He has no clinical indication for liver transplant at this time.  MELD score < 15.  Will plan to continue to check MELD labs every 3 months, imaging every 6 months and variceal screening every 1-2.  Will need routine office visit every 3-6 months depending on stability.  Thrombocytopenia/Pancytopenia:  Had BM biopsy  recently.  I do not have the report.  I will refer him to our hematology team.  Hopefully they can review his BM biopsy as this degree of thrombocytopenia is not consistent with cirrhotic portal HTN alone.  He requires EGD/liver biopsy and I hope hematology can help us address possibly benefit of doptelet or mulpleta.  Volume: Ascites/Edema currently well controlled off diuretics.  Will continue sodium restricted diet and continue to monitor for the development of ascites/edema.  Hepatic Encephalopathy: No history of hepatic encephalopathy.  Mr. Noel and his family/care giver are aware of the signs/symptoms of hepatic encephalopathy and understand to seek immediate medical attention should they arise..  Esophageal Varices: No known history of esophageal varices, however MRI shows periesophageal and perigastric varices.  Would like to schedule EGD, however will need severe thrombocytopenia addressed first as above.  Hepatoma Screening: Last abdominal imaging was an MRI done last month.  Will plan for repeat imaging in March.  Nutritional status: Mild sarcopenia suggest by physical exam.  Encouraged high protein snacking, low salt diet.  If weight loss evident, will refer to nutritional counseling.     ROS:  01/02/24 Reviewed 12 systems: See symptoms in HPI  Presently no other neurological, cardiac, pulmonary, GI and  symptoms other than listed in HPI.  Other symptoms are in HPI.  No  fever, chills, bone pains, skin rash, night sweats, arthritic symptoms,  tiredness , weakness, numbness, claudication and gait problem. No frequent infections.  Not unusually sensitive to heat or cold. No swelling of the ankles. No swollen glands.  Patient is anxious.       Historical Information   Past Medical History:   Diagnosis Date    Cirrhosis (HCC)     Diabetes mellitus (HCC)     Low platelet count (HCC)     Spleen enlarged      No past surgical history on file.  Social History   Social History     Substance and Sexual  "Activity   Alcohol Use Never     Social History     Substance and Sexual Activity   Drug Use Not Currently     Social History     Tobacco Use   Smoking Status Never    Passive exposure: Never   Smokeless Tobacco Never     Family History: No family history on file.      Current Outpatient Medications:     atorvastatin (LIPITOR) 10 mg tablet, Take 10 mg by mouth daily, Disp: , Rfl:     metFORMIN (GLUCOPHAGE-XR) 500 mg 24 hr tablet, 500 mg, Disp: , Rfl:     Allergies   Allergen Reactions    Aspirin Other (See Comments)     Pt states per Doctor      @ ROS@  Physical Exam:  Vitals:    01/02/24 0911   BP: 122/64   BP Location: Left arm   Patient Position: Sitting   Cuff Size: Adult   Pulse: (!) 127   Resp: 18   Temp: 98.8 °F (37.1 °C)   TempSrc: Temporal   SpO2: 93%   Weight: 65.3 kg (144 lb)   Height: 5' 5\" (1.651 m)   Heart rate 104 when I checked  Alert, oriented, not in distress, vitals are above, no icterus, no oral thrush, no palpable neck mass, clear lung fields, regular heart rate, abdomen  soft and non tender, no palpable abdominal mass, no ascites, 1+ edema of ankles, no calf tenderness, no focal neurological deficit, no skin rash, no palpable lymphadenopathy in the neck and axillary areas,  no clubbing.   Patient is anxious.  Performance status 1.      Lab Results: I have reviewed all pertinent labs.  LABS:    Results for orders placed or performed during the hospital encounter of 10/06/23   Echo complete w/ contrast if indicated   Result Value Ref Range    LA size 3.1 cm    Triscuspid Valve Regurgitation Peak Gradient 8.0 mmHg    Tricuspid valve peak regurgitation velocity 1.39 m/s    LVPWd 0.90 cm    Left Atrium Area-systolic Apical Two Chamber 18.9 cm2    Left Atrium Area-systolic Four Chamber 13.8 cm2    MV E' Tissue Velocity Septal 14 cm/s    TR Peak Salvatore 1.4 m/s    IVSd 0.90 cm    LV DIASTOLIC VOLUME (MOD BIPLANE) 2D 76 mL    LEFT VENTRICLE SYSTOLIC VOLUME (MOD BIPLANE) 2D 14 mL    Left ventricular stroke " volume (2D) 63.00 mL    EF 65 %    A4C EF 63 %    LA length (A2C) 4.80 cm    LVIDd 4.20 cm    IVS 0.9 cm    LVIDS 2.10 cm    FS 50 28 - 44 %    LA volume (BP) 47 mL    Asc Ao 2.6 cm    Ao root 2.70 cm    RVID d 3.7 cm    MV valve area p 1/2 method 5.37 cm2    E/A ratio 0.81     E wave deceleration time 143 ms    MV Peak E Salvatore 128 cm/s    MV Peak A Salvatore 1.58 m/s    LV Systolic Volume (BP) 24 mL    LV Diastolic Volume (BP) 68 mL    RAA A4C 14.8 cm2    MV stenosis pressure 1/2 time 41 ms    LVSV, 2D 63 mL    LV EF 65          Imaging Studies: I have personally reviewed pertinent reports.      IMPRESSION:     Cirrhosis, splenomegaly and varices compatible with portal hypertension. Small volume of free fluid in the gallbladder fossa, without gallbladder wall thickening or cholelithiasis, likely secondary to cirrhosis. Clinical correlation is recommended to   exclude possibility of acute cholecystitis.     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     The study was marked in EPIC for significant notification.     Workstation performed: [id number]        Imaging    MRI abdomen w wo contrast (Order: [id number]) - 10/8/2023  Pathology, and Other Studies: I have personally reviewed pertinent reports.        Assessment and Plan:  See diagnoses, orders instructions below   Patient states he has low platelet count for a while and had bone marrow test at [facility] that was read at [facility].  He has reports at home and he is going to upload that into my chart.  I checked the records at [facility] but there is no bone marrow report.  Occasionally he has small nosebleed during winter months.  He has chronic cough and exertional dyspnea.  He gives history of yellowish to reddish urine color occasionally.  He states last year he had sepsis and he had lost weight but now weight is stable.  Occasionally swelling of the ankles.  He wants to gain some with his weight back.  History of diabetes mellitus and  dyslipidemia.  History of IBS  No history of smoking and drinking.  He is a   Patient could be going for tooth extraction.  He has not seen oral surgeon yet.  Copied from records of Dr. Arsenio Pyle  cirrhosis secondary to Nonalcoholic steatohepatitis and Auto immune hepatitis which is complicated with thrombocytopenia, and LE edema.   Problem List and Plan:     Cirrhosis: Most MELD 3.0 Score is 11.  Etiology is not clear, however he has risk for KRUGER related cirrhosis and has positive autoimmune studies (ELIGIO and elevated IgG).  Liver biopsy would help determine etiology, however will need to address thrombocytopenia first.  He has no clinical indication for liver transplant at this time.  MELD score < 15.  Will plan to continue to check MELD labs every 3 months, imaging every 6 months and variceal screening every 1-2.  Will need routine office visit every 3-6 months depending on stability.  Thrombocytopenia/Pancytopenia:  Had BM biopsy recently.  I do not have the report.  I will refer him to our hematology team.  Hopefully they can review his BM biopsy as this degree of thrombocytopenia is not consistent with cirrhotic portal HTN alone.  He requires EGD/liver biopsy and I hope hematology can help us address possibly benefit of doptelet or mulpleta.  Volume: Ascites/Edema currently well controlled off diuretics.  Will continue sodium restricted diet and continue to monitor for the development of ascites/edema.  Hepatic Encephalopathy: No history of hepatic encephalopathy.  Mr. Noel and his family/care giver are aware of the signs/symptoms of hepatic encephalopathy and understand to seek immediate medical attention should they arise..  Esophageal Varices: No known history of esophageal varices, however MRI shows periesophageal and perigastric varices.  Would like to schedule EGD, however will need severe thrombocytopenia addressed first as above.  Hepatoma Screening: Last abdominal  imaging was an MRI done last month.  Will plan for repeat imaging in March.  Nutritional status: Mild sarcopenia suggest by physical exam.  Encouraged high protein snacking, low salt diet.  If weight loss evident, will refer to nutritional counseling.      Patient will continue to follow with  primary physician and other consultants.  Patient voiced understanding and agrees  Physical examination and test results are as recorded and discussed.  Thrombocytopenia appears to be out of proportion to mild splenomegaly.  I would like to see the bone marrow test result.  He had pain post bone marrow procedure while 2 days and he is not leaning towards having another bone marrow test.  He has diet instructions from GI service.  He follows with GI service for liver and liver related problems and IBS.  He states he has IBS.  Patient is capable of self-care.  Goal is to find out more about the cause of thrombocytopenia and to manage accordingly.  Discussed in detail.  Questions answered.  See diagnoses, orders and instructions below.  For tooth extraction he will call me after his visit with oral surgeon.  They could have topical fibrin glue.  He could have antifibrinolytic agents and he could have platelet transfusions if needed.  He understand the risk of bleeding.  For cough and exertional dyspnea he will have chest x-ray and may need CT chest and visit with lung specialist.  Discussed all this with the patient in detail and questions answered      1. Cirrhosis of liver without ascites, unspecified hepatic cirrhosis type (HCC)    - APTT; Future  - Protime-INR; Future  - CBC and differential; Future  - Comprehensive metabolic panel; Future  - Ammonia; Future  - Fibrinogen; Future    2. Thrombocytopenia (HCC)    - CBC and differential; Future    3. Bleeding disorder (HCC)    - APTT; Future  - Protime-INR; Future  - Fibrinogen; Future    4. Shortness of breath    - XR chest pa & lateral; Future    5. Edema of both  ankles      6. Chronic cough    - XR chest pa & lateral; Future    7. Urine discoloration    - UA (URINE) with reflex to Scope    8. Splenomegaly      9. Type 2 diabetes mellitus without complication, without long-term current use of insulin (HCC)      10. Dyslipidemia    Ordered blood work, urine test and chest x-ray.  Please try to obtain records from Dr. [facility] in [place].  Also please try to get records from patient's primary physician [facility] primary care Dr. Maicol Morris.  Patient will use saline nasal drops to keep nasal cavities moist.  He will let me know instructions he received from oral surgeon for tooth extraction.  Follow-up in 3 weeks.    Disclaimer: This document was prepared using a dictation device. If a word or phrase is confusing, or does not make sense, this is likely due to recognition error which was not discovered during the providers review. If you believe an error has occurred, please Contact me through [facility] HOPE Line service for deejay?cation.    Counseling / Coordination of Care  ..  Provided counseling and support

## 2024-01-10 ENCOUNTER — TELEPHONE (OUTPATIENT)
Dept: HEMATOLOGY ONCOLOGY | Facility: CLINIC | Age: 44
End: 2024-01-10

## 2024-01-10 NOTE — TELEPHONE ENCOUNTER
Called to reschedule upcoming appt with Dr Quesada. No answer, VM message left requesting call back

## 2024-01-18 ENCOUNTER — TELEPHONE (OUTPATIENT)
Dept: GASTROENTEROLOGY | Facility: CLINIC | Age: 44
End: 2024-01-18

## 2024-01-18 NOTE — TELEPHONE ENCOUNTER
Spoke with pt regarding lab work from last visit on 10/24. Asked if he was able to get lab work done before f/u . Pt states he will try depending on weather next couple of days and if he isnt able to I advised he call back to reschedule apt. Pt verbalized understanding.

## 2024-01-25 ENCOUNTER — TELEPHONE (OUTPATIENT)
Dept: HEMATOLOGY ONCOLOGY | Facility: CLINIC | Age: 44
End: 2024-01-25

## 2024-01-25 NOTE — TELEPHONE ENCOUNTER
Patient Call    Who are you speaking with? Patient    If it is not the patient, are they listed on an active communication consent form? N/A   What is the reason for this call? Patient is requesting a call back about getting a scheduled appointment for platelets due to a dental procedure on 2/1/24   Does this require a call back? Yes   If a call back is required, please list best call back number [phone]   If a call back is required, advise that a message will be forwarded to their care team and someone will return their call as soon as possible.   Did you relay this information to the patient? Yes

## 2024-01-26 ENCOUNTER — TELEPHONE (OUTPATIENT)
Dept: HEMATOLOGY ONCOLOGY | Facility: CLINIC | Age: 44
End: 2024-01-26

## 2024-01-26 DIAGNOSIS — D69.6 THROMBOCYTOPENIA (HCC): ICD-10-CM

## 2024-01-26 DIAGNOSIS — K74.60 CIRRHOSIS OF LIVER WITHOUT ASCITES, UNSPECIFIED HEPATIC CIRRHOSIS TYPE (HCC): Primary | ICD-10-CM

## 2024-01-26 DIAGNOSIS — D69.9 BLEEDING DISORDER (HCC): ICD-10-CM

## 2024-01-26 NOTE — TELEPHONE ENCOUNTER
Called patient to offer any other times that could be avaliable there were to open slots last week in January that I had offered patient he denied as he wont be able to make it. He will keep the February 28 appointment and will call in to see if anything opens up sooner.

## 2024-01-26 NOTE — TELEPHONE ENCOUNTER
Appointment Change  Cancel, Reschedule, Change to Virtual      Who are you speaking with? Patient   If it is not the patient, is the caller listed on the communication consent form? N/A   Which provider is the appointment scheduled with? Dr. Quesada   When was the original appointment scheduled?    Please list date and time 02/08/2024 @ 8:20AM    At which location is the appointment scheduled to take place? [place]   Was the appointment rescheduled?     Was the appointment changed from an in person visit to a virtual visit?    If so, please list the details of the change.                 Yes, 02/29/2024 @ 3PM    What is the reason for the appointment change? Provider.    Patient states he needs to be seen before 02/28/2024.    The patient is concerned that his appointment is scheduled far out.

## 2024-01-26 NOTE — TELEPHONE ENCOUNTER
Returned telephone call spoke with Pt.  Dr Quesada has ordered labs to be drawn and after getting results will set up platelet transfusion if needed.  Pt plans to go tomorrow to [facility] at [address] [place] [address], [phone].  Requesting labs be faxed to this [facility].

## 2024-01-29 ENCOUNTER — TELEPHONE (OUTPATIENT)
Dept: HEMATOLOGY ONCOLOGY | Facility: CLINIC | Age: 44
End: 2024-01-29

## 2024-01-29 ENCOUNTER — TELEPHONE (OUTPATIENT)
Age: 44
End: 2024-01-29

## 2024-01-29 NOTE — TELEPHONE ENCOUNTER
Patients GI provider:  Arsenio Travis    Number to return call: [phone]    Reason for call: Pt calling requesting to speak with someone regarding few labs order and clearance for colonoscopy    Scheduled procedure/appointment date if applicable: Apt/procedure

## 2024-01-29 NOTE — TELEPHONE ENCOUNTER
Lab Inquiry   Who are you speaking with? Patient     If it is not the patient, are they listed on an active communication consent form? Yes   Name of ordering provider Dr. Quesada   What is being requested? Patient requesting labs to be faxed to [facility] at fax number in [place] on [address]   Lab draw location [facility]   What is the best call back number? [phone]

## 2024-01-30 ENCOUNTER — TELEPHONE (OUTPATIENT)
Dept: HEMATOLOGY ONCOLOGY | Facility: CLINIC | Age: 44
End: 2024-01-30

## 2024-01-30 LAB
APTT PPP: 33 SEC (ref 24–33)
BASOPHILS # BLD AUTO: 0 X10E3/UL (ref 0–0.2)
BASOPHILS NFR BLD AUTO: 0 %
EOSINOPHIL # BLD AUTO: 0.1 X10E3/UL (ref 0–0.4)
EOSINOPHIL NFR BLD AUTO: 4 %
ERYTHROCYTE [DISTWIDTH] IN BLOOD BY AUTOMATED COUNT: 16.1 % (ref 11.6–15.4)
HCT VFR BLD AUTO: 37.1 % (ref 37.5–51)
HGB BLD-MCNC: 12.9 G/DL (ref 13–17.7)
IMM GRANULOCYTES # BLD: 0 X10E3/UL (ref 0–0.1)
IMM GRANULOCYTES NFR BLD: 0 %
INR PPP: 1.3 (ref 0.9–1.2)
LYMPHOCYTES # BLD AUTO: 1 X10E3/UL (ref 0.7–3.1)
LYMPHOCYTES NFR BLD AUTO: 34 %
MCH RBC QN AUTO: 34.3 PG (ref 26.6–33)
MCHC RBC AUTO-ENTMCNC: 34.8 G/DL (ref 31.5–35.7)
MCV RBC AUTO: 99 FL (ref 79–97)
MONOCYTES # BLD AUTO: 0.3 X10E3/UL (ref 0.1–0.9)
MONOCYTES NFR BLD AUTO: 10 %
MORPHOLOGY BLD-IMP: ABNORMAL
NEUTROPHILS # BLD AUTO: 1.5 X10E3/UL (ref 1.4–7)
NEUTROPHILS NFR BLD AUTO: 52 %
PLATELET # BLD AUTO: 25 X10E3/UL (ref 150–450)
PROTHROMBIN TIME: 13.7 SEC (ref 9.1–12)
RBC # BLD AUTO: 3.76 X10E6/UL (ref 4.14–5.8)
WBC # BLD AUTO: 2.9 X10E3/UL (ref 3.4–10.8)

## 2024-01-30 NOTE — TELEPHONE ENCOUNTER
I spoke with pt and advised him he did dr livingston labs yesterday and he did have other labs from other doctor that he states he will call and speak with them about. I also asked him about any questions for colonoscopy and he states it was miscommunication he isnt having colonoscopy. Pt verbalized understanding.

## 2024-01-30 NOTE — TELEPHONE ENCOUNTER
Patient Call    Who are you speaking with? Patient    If it is not the patient, are they listed on an active communication consent form? N/A   What is the reason for this call? Patient calling in regards to his upcoming dental procedure.  Patient had labs done and his platelets are low.  Patient would like Dr Quesada to order platelets for patient to receive before this procedure. Patient is scheduled to have this procedure done on Thursday 2/1/24.  Patient would like a call back to discuss.    Patient states the dentist told him his platelet count needs to be 75,000 or higher for the procedure.  Patient's count is currently at 25,000.        Does this require a call back? Yes   If a call back is required, please list best call back number [phone]   If a call back is required, advise that a message will be forwarded to their care team and someone will return their call as soon as possible.   Did you relay this information to the patient? Yes

## 2024-01-31 ENCOUNTER — TELEPHONE (OUTPATIENT)
Dept: HEMATOLOGY ONCOLOGY | Facility: CLINIC | Age: 44
End: 2024-01-31

## 2024-01-31 NOTE — TELEPHONE ENCOUNTER
Returned telephone call spoke with Ana.  Pt needs to have 1 tooth extracted.  Dr Bar Winslow will be doing the extraction at their [address]  Dr Quesada will call Dr Winslow today after 2 pm to discuss the plan to coordinate platelet transfusion prior to the extraction.   ext [phone] this is Ana's direct line and then she will get Dr Winslow on the call.

## 2024-01-31 NOTE — TELEPHONE ENCOUNTER
Patient Call    Who are you speaking with? [facility]     If it is not the patient, are they listed on an active communication consent form? N/A   What is the reason for this call? Ana is needing the medical carmen for the tooth extraction. They had it scheduled it for 2/1/24 but will cancel due to not having a physical paper with this information   Does this require a call back? Yes   If a call back is required, please list best call back number F: [phone]    P: [phone]  Ext [phone] (Ana)   If a call back is required, advise that a message will be forwarded to their care team and someone will return their call as soon as possible.   Did you relay this information to the patient? Yes

## 2024-02-06 ENCOUNTER — TELEPHONE (OUTPATIENT)
Dept: HEMATOLOGY ONCOLOGY | Facility: CLINIC | Age: 44
End: 2024-02-06

## 2024-02-06 NOTE — TELEPHONE ENCOUNTER
Patient Call    Who are you speaking with? [facility]     If it is not the patient, are they listed on an active communication consent form? na   What is the reason for this call? Ana calling to confirm if patient has been scheduled for his platelets transfusion.  She states Mondays or Fridays  Is not available.    Does this require a call back? Yes   If a call back is required, please list best call back number [phone]-ext [phone]   If a call back is required, advise that a message will be forwarded to their care team and someone will return their call as soon as possible.   Did you relay this information to the patient? Yes

## 2024-02-06 NOTE — TELEPHONE ENCOUNTER
Returned telephone call left voice message on Ana's extension.  Instructed her to call me back with the date of the extraction.  Per Dr Quesada, will give 2 units of platelets in the morning of the extraction and a pill.  Dr Quesada is asking if surgeon will be using Surgi-cell or Collaplug.

## 2024-02-08 DIAGNOSIS — D69.9 BLEEDING DISORDER (HCC): Primary | ICD-10-CM

## 2024-02-08 DIAGNOSIS — D69.6 THROMBOCYTOPENIA (HCC): ICD-10-CM

## 2024-02-08 RX ORDER — SODIUM CHLORIDE 9 MG/ML
20 INJECTION, SOLUTION INTRAVENOUS ONCE
OUTPATIENT
Start: 2024-02-20

## 2024-02-16 ENCOUNTER — HOSPITAL ENCOUNTER (OUTPATIENT)
Dept: RADIOLOGY | Facility: HOSPITAL | Age: 44
End: 2024-02-16
Payer: COMMERCIAL

## 2024-02-16 DIAGNOSIS — R05.3 CHRONIC COUGH: ICD-10-CM

## 2024-02-16 DIAGNOSIS — R06.02 SHORTNESS OF BREATH: ICD-10-CM

## 2024-02-16 PROCEDURE — 71046 X-RAY EXAM CHEST 2 VIEWS: CPT

## 2024-02-19 ENCOUNTER — TELEPHONE (OUTPATIENT)
Dept: HEMATOLOGY ONCOLOGY | Facility: CLINIC | Age: 44
End: 2024-02-19

## 2024-02-19 ENCOUNTER — APPOINTMENT (OUTPATIENT)
Dept: LAB | Facility: CLINIC | Age: 44
End: 2024-02-19
Payer: COMMERCIAL

## 2024-02-19 DIAGNOSIS — D69.9 BLEEDING DISORDER (HCC): ICD-10-CM

## 2024-02-19 DIAGNOSIS — D69.6 THROMBOCYTOPENIA (HCC): ICD-10-CM

## 2024-02-19 LAB
ABO GROUP BLD: NORMAL
BLD GP AB SCN SERPL QL: NEGATIVE
RH BLD: POSITIVE
SPECIMEN EXPIRATION DATE: NORMAL

## 2024-02-19 PROCEDURE — 86900 BLOOD TYPING SEROLOGIC ABO: CPT

## 2024-02-19 PROCEDURE — 86850 RBC ANTIBODY SCREEN: CPT

## 2024-02-19 PROCEDURE — 86901 BLOOD TYPING SEROLOGIC RH(D): CPT

## 2024-02-19 PROCEDURE — 36415 COLL VENOUS BLD VENIPUNCTURE: CPT

## 2024-02-19 RX ORDER — SODIUM CHLORIDE 9 MG/ML
20 INJECTION, SOLUTION INTRAVENOUS ONCE
Status: CANCELLED | OUTPATIENT
Start: 2024-02-19

## 2024-02-19 NOTE — TELEPHONE ENCOUNTER
Patient Call    Who are you speaking with? Patient    If it is not the patient, are they listed on an active communication consent form? N/A   What is the reason for this call? Called pt to let him know he should go to [place] outpatient lab for type and screen per nurse   Does this require a call back? N/A   If a call back is required, please list best call back number N/a   If a call back is required, advise that a message will be forwarded to their care team and someone will return their call as soon as possible.   Did you relay this information to the patient? N/A

## 2024-02-19 NOTE — TELEPHONE ENCOUNTER
Patient Call    Who are you speaking with? Patient    If it is not the patient, are they listed on an active communication consent form? N/A   What is the reason for this call? He asked where he should get his type and screen   Does this require a call back? Yes   If a call back is required, please list best call back number [phone]    If a call back is required, advise that a message will be forwarded to their care team and someone will return their call as soon as possible.   Did you relay this information to the patient? Yes

## 2024-02-19 NOTE — TELEPHONE ENCOUNTER
Pt to go to [place] outpt lab for his type and screen as he is getting a blood transfusion at [facility].

## 2024-02-20 ENCOUNTER — HOSPITAL ENCOUNTER (OUTPATIENT)
Dept: INFUSION CENTER | Facility: CLINIC | Age: 44
Discharge: HOME | End: 2024-02-20
Payer: COMMERCIAL

## 2024-02-20 VITALS
OXYGEN SATURATION: 97 % | TEMPERATURE: 98.1 F | DIASTOLIC BLOOD PRESSURE: 70 MMHG | RESPIRATION RATE: 18 BRPM | SYSTOLIC BLOOD PRESSURE: 141 MMHG | HEART RATE: 110 BPM

## 2024-02-20 DIAGNOSIS — D69.9 BLEEDING DISORDER (HCC): Primary | ICD-10-CM

## 2024-02-20 DIAGNOSIS — D69.6 THROMBOCYTOPENIA (HCC): ICD-10-CM

## 2024-02-20 PROCEDURE — 36430 TRANSFUSION BLD/BLD COMPNT: CPT

## 2024-02-20 PROCEDURE — P9035 PLATELET PHERES LEUKOREDUCED: HCPCS

## 2024-02-20 RX ORDER — TRANEXAMIC ACID 650 MG/1
1300 TABLET ORAL 2 TIMES DAILY
Qty: 12 TABLET | Refills: 0 | Status: SHIPPED | OUTPATIENT
Start: 2024-02-20 | End: 2024-02-23

## 2024-02-20 RX ORDER — AMINOCAPROIC ACID 1000 MG/1
1 TABLET ORAL 3 TIMES DAILY
Qty: 9 TABLET | Refills: 0 | Status: CANCELLED | OUTPATIENT
Start: 2024-02-20 | End: 2024-02-23

## 2024-02-20 RX ORDER — SODIUM CHLORIDE 9 MG/ML
20 INJECTION, SOLUTION INTRAVENOUS ONCE
Status: COMPLETED | OUTPATIENT
Start: 2024-02-20 | End: 2024-02-20

## 2024-02-20 RX ADMIN — SODIUM CHLORIDE 20 ML/HR: 0.9 INJECTION, SOLUTION INTRAVENOUS at 11:45

## 2024-02-20 NOTE — PROGRESS NOTES
Patient tolerated platelet transfusion without incident. Peripheral IV removed. No further appointments scheduled. AVS offered and declined.

## 2024-02-20 NOTE — PROGRESS NOTES
Patient leaving infusion cneter by elevators when he informed check in staff he felt itchy. RN brought patient back to treatment chair and retook vital signs. No redness noted, one small raised hive noted, patient denies SOB no itching or swelling in throat or mouth. Contacted Yoana YUAN, informed her of situation and that PIV has already been removed and patient has tooth extraction for 1400. Patient observed for 10-15 minutes and reported improvement. Yoana sending Amicar 1G to patients pharmacy, confirmed  [address] to be used. Patient voiced understanding to pick this up today and start taking. He was stable and ambulatory leaving infusion center.

## 2024-02-21 LAB
ABO GROUP BLD BPU: NORMAL
ABO GROUP BLD BPU: NORMAL
ALBUMIN SERPL-MCNC: 3.6 G/DL (ref 4.1–5.1)
ALBUMIN/GLOB SERPL: 0.9 {RATIO} (ref 1.2–2.2)
ALP SERPL-CCNC: 105 IU/L (ref 44–121)
ALT SERPL-CCNC: 28 IU/L (ref 0–44)
AMMONIA PLAS-MCNC: 160 UG/DL (ref 40–200)
APTT PPP: 31 SEC (ref 24–33)
AST SERPL-CCNC: 49 IU/L (ref 0–40)
BASOPHILS # BLD AUTO: 0 X10E3/UL (ref 0–0.2)
BASOPHILS NFR BLD AUTO: 0 %
BILIRUB SERPL-MCNC: 2.2 MG/DL (ref 0–1.2)
BPU ID: NORMAL
BPU ID: NORMAL
BUN SERPL-MCNC: 7 MG/DL (ref 6–24)
BUN/CREAT SERPL: 10 (ref 9–20)
CALCIUM SERPL-MCNC: 9.4 MG/DL (ref 8.7–10.2)
CHLORIDE SERPL-SCNC: 106 MMOL/L (ref 96–106)
CO2 SERPL-SCNC: 19 MMOL/L (ref 20–29)
CREAT SERPL-MCNC: 0.67 MG/DL (ref 0.76–1.27)
EGFR: 120 ML/MIN/1.73
EOSINOPHIL # BLD AUTO: 0.1 X10E3/UL (ref 0–0.4)
EOSINOPHIL NFR BLD AUTO: 3 %
ERYTHROCYTE [DISTWIDTH] IN BLOOD BY AUTOMATED COUNT: 16.2 % (ref 11.6–15.4)
FIBRINOGEN AG PPP NEPH-MCNC: 243 MG/DL (ref 206–478)
FIBRINOGEN PPP-MCNC: 187 MG/DL (ref 193–507)
GLOBULIN SER-MCNC: 3.9 G/DL (ref 1.5–4.5)
GLUCOSE SERPL-MCNC: 127 MG/DL (ref 70–99)
HCT VFR BLD AUTO: 38.6 % (ref 37.5–51)
HGB BLD-MCNC: 13.5 G/DL (ref 13–17.7)
IMM GRANULOCYTES # BLD: 0 X10E3/UL (ref 0–0.1)
IMM GRANULOCYTES NFR BLD: 0 %
INR PPP: 1.3 (ref 0.9–1.2)
LYMPHOCYTES # BLD AUTO: 0.9 X10E3/UL (ref 0.7–3.1)
LYMPHOCYTES NFR BLD AUTO: 29 %
MCH RBC QN AUTO: 34 PG (ref 26.6–33)
MCHC RBC AUTO-ENTMCNC: 35 G/DL (ref 31.5–35.7)
MCV RBC AUTO: 97 FL (ref 79–97)
MONOCYTES # BLD AUTO: 0.3 X10E3/UL (ref 0.1–0.9)
MONOCYTES NFR BLD AUTO: 10 %
MORPHOLOGY BLD-IMP: ABNORMAL
NEUTROPHILS # BLD AUTO: 1.8 X10E3/UL (ref 1.4–7)
NEUTROPHILS NFR BLD AUTO: 58 %
PLATELET # BLD AUTO: 28 X10E3/UL (ref 150–450)
POTASSIUM SERPL-SCNC: 4.1 MMOL/L (ref 3.5–5.2)
PROT SERPL-MCNC: 7.5 G/DL (ref 6–8.5)
PROTHROMBIN TIME: 13.9 SEC (ref 9.1–12)
RBC # BLD AUTO: 3.97 X10E6/UL (ref 4.14–5.8)
SODIUM SERPL-SCNC: 140 MMOL/L (ref 134–144)
UNIT DISPENSE STATUS: NORMAL
UNIT DISPENSE STATUS: NORMAL
UNIT PRODUCT CODE: NORMAL
UNIT PRODUCT CODE: NORMAL
UNIT PRODUCT VOLUME: 300 ML
UNIT PRODUCT VOLUME: 300 ML
UNIT RH: NORMAL
UNIT RH: NORMAL
WBC # BLD AUTO: 3.2 X10E3/UL (ref 3.4–10.8)

## 2024-02-26 ENCOUNTER — TELEPHONE (OUTPATIENT)
Dept: HEMATOLOGY ONCOLOGY | Facility: CLINIC | Age: 44
End: 2024-02-26

## 2024-02-26 ENCOUNTER — HOSPITAL ENCOUNTER (OUTPATIENT)
Dept: INFUSION CENTER | Facility: HOSPITAL | Age: 44
Discharge: HOME | End: 2024-02-26
Payer: COMMERCIAL

## 2024-02-26 VITALS
SYSTOLIC BLOOD PRESSURE: 140 MMHG | RESPIRATION RATE: 18 BRPM | DIASTOLIC BLOOD PRESSURE: 84 MMHG | HEART RATE: 102 BPM | TEMPERATURE: 98.7 F

## 2024-02-26 DIAGNOSIS — D69.6 THROMBOCYTOPENIA (HCC): ICD-10-CM

## 2024-02-26 DIAGNOSIS — D69.9 BLEEDING DISORDER (HCC): Primary | ICD-10-CM

## 2024-02-26 DIAGNOSIS — L50.9 HIVES: Primary | ICD-10-CM

## 2024-02-26 DIAGNOSIS — L50.9 HIVES: ICD-10-CM

## 2024-02-26 LAB
BASOPHILS # BLD AUTO: 0.01 THOUSANDS/ÂΜL (ref 0–0.1)
BASOPHILS NFR BLD AUTO: 0 % (ref 0–1)
EOSINOPHIL # BLD AUTO: 0.05 THOUSAND/ÂΜL (ref 0–0.61)
EOSINOPHIL NFR BLD AUTO: 2 % (ref 0–6)
ERYTHROCYTE [DISTWIDTH] IN BLOOD BY AUTOMATED COUNT: 17.3 % (ref 11.6–15.1)
HCT VFR BLD AUTO: 40 % (ref 36.5–49.3)
HGB BLD-MCNC: 13.3 G/DL (ref 12–17)
IMM GRANULOCYTES # BLD AUTO: 0.03 THOUSAND/UL (ref 0–0.2)
IMM GRANULOCYTES NFR BLD AUTO: 1 % (ref 0–2)
LYMPHOCYTES # BLD AUTO: 0.68 THOUSANDS/ÂΜL (ref 0.6–4.47)
LYMPHOCYTES NFR BLD AUTO: 22 % (ref 14–44)
MCH RBC QN AUTO: 33.4 PG (ref 26.8–34.3)
MCHC RBC AUTO-ENTMCNC: 33.3 G/DL (ref 31.4–37.4)
MCV RBC AUTO: 101 FL (ref 82–98)
MONOCYTES # BLD AUTO: 0.22 THOUSAND/ÂΜL (ref 0.17–1.22)
MONOCYTES NFR BLD AUTO: 7 % (ref 4–12)
NEUTROPHILS # BLD AUTO: 2.1 THOUSANDS/ÂΜL (ref 1.85–7.62)
NEUTS SEG NFR BLD AUTO: 68 % (ref 43–75)
NRBC BLD AUTO-RTO: 0 /100 WBCS
PLATELET # BLD AUTO: 23 THOUSANDS/UL (ref 149–390)
RBC # BLD AUTO: 3.98 MILLION/UL (ref 3.88–5.62)
WBC # BLD AUTO: 3.09 THOUSAND/UL (ref 4.31–10.16)

## 2024-02-26 PROCEDURE — 96375 TX/PRO/DX INJ NEW DRUG ADDON: CPT

## 2024-02-26 PROCEDURE — 36430 TRANSFUSION BLD/BLD COMPNT: CPT

## 2024-02-26 PROCEDURE — 85025 COMPLETE CBC W/AUTO DIFF WBC: CPT

## 2024-02-26 PROCEDURE — P9037 PLATE PHERES LEUKOREDU IRRAD: HCPCS

## 2024-02-26 PROCEDURE — 36415 COLL VENOUS BLD VENIPUNCTURE: CPT

## 2024-02-26 PROCEDURE — 96365 THER/PROPH/DIAG IV INF INIT: CPT

## 2024-02-26 RX ORDER — SODIUM CHLORIDE 9 MG/ML
20 INJECTION, SOLUTION INTRAVENOUS ONCE
Status: CANCELLED | OUTPATIENT
Start: 2024-02-26

## 2024-02-26 RX ORDER — SODIUM CHLORIDE 9 MG/ML
20 INJECTION, SOLUTION INTRAVENOUS ONCE
Status: COMPLETED | OUTPATIENT
Start: 2024-02-26 | End: 2024-02-26

## 2024-02-26 RX ORDER — TRANEXAMIC ACID 650 MG/1
1300 TABLET ORAL 2 TIMES DAILY
Qty: 20 TABLET | Refills: 0 | Status: SHIPPED | OUTPATIENT
Start: 2024-02-26

## 2024-02-26 RX ADMIN — DIPHENHYDRAMINE HYDROCHLORIDE 12.5 MG: 50 INJECTION, SOLUTION INTRAMUSCULAR; INTRAVENOUS at 15:06

## 2024-02-26 RX ADMIN — HYDROCORTISONE SODIUM SUCCINATE 100 MG: 100 INJECTION, POWDER, FOR SOLUTION INTRAMUSCULAR; INTRAVENOUS at 15:08

## 2024-02-26 RX ADMIN — SODIUM CHLORIDE 20 ML/HR: 0.9 INJECTION, SOLUTION INTRAVENOUS at 15:05

## 2024-02-26 RX ADMIN — HYDROCORTISONE SODIUM SUCCINATE 50 MG: 100 INJECTION, POWDER, FOR SOLUTION INTRAMUSCULAR; INTRAVENOUS at 16:56

## 2024-02-26 NOTE — TELEPHONE ENCOUNTER
Telephone call to  [facility] spoke with Ana.  Updated the telephone info from Pt.  She will call the Pt.  Telephone call spoke with Pt.  Dr Quesada will be sending to Pharmacy a refill of the tranexamic acid, same has he took last week but this time take for 5 days.  Also Pt will go to [facility] infusion today at 230 pm for 1 unit of Platelets.  They will draw cbcd but not wait for the results.  Pt stated he understands.

## 2024-02-26 NOTE — NURSING NOTE
Pt scheduled to come in today for platelet transfusion.  Note from last transfusion states pt had delayed reaction, no premeds added to plan.  Reached out to Yoana Domingo RN to see if they would like to add any premeds to blood plan today.  Per Yoana, she is adding benadryl and solu-cortef and we need to draw CBC prior to infusion, but do not need to wait for results.

## 2024-02-26 NOTE — TELEPHONE ENCOUNTER
Patient Call    Who are you speaking with? Patient    If it is not the patient, are they listed on an active communication consent form? N/A   What is the reason for this call? Patient calling to request the infusion appt he cancelled for today at 11:30 am.     Does this require a call back? Yes   If a call back is required, please list best call back number [phone]   If a call back is required, advise that a message will be forwarded to their care team and someone will return their call as soon as possible.   Did you relay this information to the patient? While assisting patient call disconnected

## 2024-02-26 NOTE — TELEPHONE ENCOUNTER
Patient Call    Who are you speaking with? Patient    If it is not the patient, are they listed on an active communication consent form? N/A   What is the reason for this call? Patient calling in regards to symptoms he's experiencing.  Patient had surgery on his tooth last Tuesday, 2/20/24.  Patient states that his tooth starting bleeding yesterday.  The bleeding stopped for a little bit but is now bleeding again. Patient states the tooth is bleeding a lot and continuously.      Patient would like a call back to discuss his symptoms.     I informed patient he could go to ED or urgent care for immediate assistance.    Does this require a call back? Yes   If a call back is required, please list best call back number [phone]   If a call back is required, advise that a message will be forwarded to their care team and someone will return their call as soon as possible.   Did you relay this information to the patient? Yes

## 2024-02-26 NOTE — PROGRESS NOTES
Platelets completed at 1620. VSS.  1630 patient noted he was itchy and had a hive to his left shoulder blade area.  No other complaints.  Discussed with Yoana Domingo RN, pt received premeds as ordered.  Pt given additional 50 mg Solucortef, will get Bendaryl at the pharmacy with his other prescriptions on the way home.  PT is aware to reach out to dr reynaga office with any further bleeding, hives, or other issues.  He verbalized understanding.    Sid Noel  tolerated treatment well with no complications.      Sid Noel is aware that he has no future appts at infusion a this time. He does have a follow up with dr tristan on 2/29 at 3 pm.     AVS printed and given to Sid Noel:   No (Declined by Sid Noel)

## 2024-02-26 NOTE — NURSING NOTE
Pt here for platelet transfusion, discussed the addition of premeds d/t delayed reaction last time.  CBC and BB hold tube drawn per orders.

## 2024-02-27 LAB
ABO GROUP BLD BPU: NORMAL
BPU ID: NORMAL
UNIT DISPENSE STATUS: NORMAL
UNIT PRODUCT CODE: NORMAL
UNIT PRODUCT VOLUME: 246 ML
UNIT RH: NORMAL

## 2024-02-28 ENCOUNTER — OFFICE VISIT (OUTPATIENT)
Dept: GASTROENTEROLOGY | Facility: CLINIC | Age: 44
End: 2024-02-28
Payer: COMMERCIAL

## 2024-02-28 VITALS
HEART RATE: 123 BPM | DIASTOLIC BLOOD PRESSURE: 78 MMHG | HEIGHT: 65 IN | OXYGEN SATURATION: 93 % | BODY MASS INDEX: 23.99 KG/M2 | SYSTOLIC BLOOD PRESSURE: 148 MMHG | TEMPERATURE: 98.6 F | WEIGHT: 144 LBS

## 2024-02-28 DIAGNOSIS — R60.0 LEG EDEMA: ICD-10-CM

## 2024-02-28 DIAGNOSIS — D61.818 PANCYTOPENIA (HCC): ICD-10-CM

## 2024-02-28 DIAGNOSIS — I86.8 INTRA-ABDOMINAL VARICES: ICD-10-CM

## 2024-02-28 DIAGNOSIS — K74.60 CIRRHOSIS OF LIVER WITHOUT ASCITES, UNSPECIFIED HEPATIC CIRRHOSIS TYPE (HCC): Primary | ICD-10-CM

## 2024-02-28 PROCEDURE — 99215 OFFICE O/P EST HI 40 MIN: CPT | Performed by: INTERNAL MEDICINE

## 2024-02-28 NOTE — PATIENT INSTRUCTIONS
As discussed today:    Medications:  Continue taking your current medications without changes  Laboratory Testing (Listed below):  Please have blood work drawn in May and August.  Radiology/Diagnostic Testing:  Please schedule an abdominal ultrasound in April  Avoid alcohol and tobacco products.  Also avoid raw seafood/oysters and avoid swimming/wading in unchlorinated barrios, particularly from the [facility], due to increased risk of infection from a bacteria called Vibrio Vulnificus.  Avoid medications called NSAID's (Motrin/Ibuprofen, Naproxen/Naprosyn/Aleve) if possible, due to increase risk of kidney dysfunction, and if you use medications containing acetaminophen (Tylenol, percocet, Endocet, and many cough/cold medications), the dose should not exceed 2 grams/day.  Seek immediate medical attention if you develop fevers over 101 F, have evidence of GI bleeding (black or bloody stools, bloody or coffee ground emesis) or confusion.  Call our office if you develop worsening symptoms related to lower extremity edema, ascites, jaundice or excessive bruising/bleeding.     US Abdomen complete scheduled on 4/8/24 at [facility] [place].

## 2024-02-28 NOTE — PROGRESS NOTES
[facility] - Outpatient Follow-Up  Sid Noel 43 y.o. male MRN: [id number]  Encounter: [id number]    PCP:  Maicol Morris MD, [phone]  Referring Provider: Dr. Shaw Em        ASSESSMENT AND PLAN:      Summary:    Mr. Noel is a 43 y.o. 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    Problem List and Plan:    Cirrhosis: Most MELD 3.0 Score is 12.  Etiology is not clear, however he has risk for KRUGER related cirrhosis and has positive autoimmune studies (ELIGIO and elevated IgG).  Liver biopsy would help determine etiology, unfortunately, he has severe thrombocytopenia which does not allow for biopsy at this time.  Even with transfusions, his platelets do not rise above mid 20s.  We can consider platelets transfusion just before and during biopsy, however I do not suspect he has active inflammation that can be treated.  If his platelets can achieve some stability above the mid 40's we will reconsider.  He has no clinical indication for liver transplant at this time.  MELD score < 15.  Will plan to continue to check MELD labs every 3 months, imaging every 6 months and variceal screening every 1-2.  Will need routine office visit every 3-6 months depending on stability.  Thrombocytopenia/Pancytopenia:  Had BM biopsy recently.  I do not have the report.  Follow-up with Dr. Quesada tomorrow.  Volume: Ascites/Edema currently well controlled off diuretics.  Will continue sodium restricted diet and continue to monitor for the development of ascites/edema.  Hepatic Encephalopathy: No history of hepatic encephalopathy.  Mr. Noel and his family/care giver are aware of the signs/symptoms of hepatic encephalopathy and understand to seek immediate medical attention should they arise..  Esophageal Varices: No known history of esophageal varices, however MRI shows periesophageal and perigastric varices.  Would like to schedule EGD,  however cannot proceed at this time due to severe thrombocytopenia.  Will re-evaluate after patients follow-up with Dr. Quesada.  Hepatoma Screening: Last abdominal imaging was an MRI done in October.  Will plan for repeat imaging in early April with an ultrasound  Nutritional status: Mild sarcopenia suggest by physical exam.  Encouraged high protein snacking, low salt diet.  If weight loss evident, will refer to nutritional counseling.     Health Maintenance:  Mr. Noel was counseled to avoid alcohol and tobacco products.  Mr. Noel was also advised to avoid raw seafood/oysters and from swimming in unchlorinated barrios, particularly from the [facility], due to increased risk of infection from Vibrio Vulnificus.  Mr. Noel was also instructed to seek immediate medical attention should he develop fevers over 101 F, evidence of GI bleeding (black or bloody stools, bloody or coffee ground emesis) or confusion.  Mr. Noel was advised to avoid NSAIDs, if possible, due to increase risk of renal dysfunction, and advised that if he should use acetaminophen, dose should not exceed 2 grams/day.  Mr. Noel was recommend to remain up to date with adult vaccination, including influenza, pneumovax and meningococcus. Inactivated HSV and zoster vaccine is safe and encouraged by PCP.  Mr. Noel was instructed to call either our office or that of his referring doctor should he develop worsening symptoms related to lower extremity edema, ascites, jaundice or excessive bruising/bleeding.      FOLLOW-UP:  Return in about 6 months (around 9/2/2024).    VISIT DIAGNOSES AND ORDERS:      1. Cirrhosis of liver without ascites, unspecified hepatic cirrhosis type (HCC)    2. Pancytopenia (HCC)    3. Leg edema    4. Intra-abdominal varices        Orders Placed This Encounter   Procedures    US abdomen complete    CBC    Comprehensive metabolic panel    Protime-INR      ______________________________________________________________________    Interval Update 2/28/24  Since his last office visit, Mr. Noel saw hematology/oncology, Dr. Quesada, and has been getting platelet transfusions.    He had a dental extraction few days ago, prior to which she had platelet transfusions.  He did state he had significant bleeding the few days after.    Otherwise, he has no significant change in health.  He continues to complain of difficulty sleeping.  Mr. Noel denies recent or history of yellow eyes/skin, GI bleeding, abdominal distention with fluid, pruritus or confusion.  He does complain of intermittent LE edema.  He also complains of easy bruising and excessive bleeding.  He denies abdominal pain, nausea, vomiting, heartburn, reflux, difficulty swallowing, early satiety, bloating, diarrhea, constipation or straining with passing stools.  He does admit to forgetting to eat when busy at work, but denies lack of appetite.      History:    HPI From Office visit on 10/24/23:  Mr. Sid Noel is a 43 y.o. male from [facility] with medical history as outlined below, including type 2 diabetes, hyperlipidemia, history of dengue fever, strong family history of cirrhosis [2 maternal uncles], who comes in today for initial consultation.  He has previously been, Dr. Em at [facility], and Dr. Yasir Blount at [facility] last month after referral for transplant evaluation.    He was recently diagnosed with cirrhosis after right upper quadrant ultrasound, when hospitalized for sepsis and generalized weakness and May.  He has a history of pancytopenia and underwent further evaluation including a bone marrow biopsy.  He believes he has some LE edema at that time, which has resolved since.    Work-up for chronic liver disease was done and was significant for a positive ELIGIO, elevated IgG.  He does have a history of diabetes and hyperlipidemia.  He did not undergo a liver  biopsy.    He had an MRI done on 10/8/2023 showing cirrhosis, splenomegaly and varices compatible with portal pretension with small volume of fluid in the gallbladder fossa.    Most recent blood work is from 10/4/2023:  Sodium 141, creatinine 0.67, total protein 7.2, albumin 3.5, AST 54, ALT 30, total bilirubin 1.6, WBCs 2.9, hemoglobin 12.5, platelets 27, alpha-fetoprotein 6.4, INR 1.3.    In the office today, he complains of difficulty sleeping and occasional daytime sleepiness.  Mr. Noel denies recent or history of yellow eyes/skin, GI bleeding, abdominal distention with fluid, pruritus or confusion.  He does complain of intermittent LE edema.  He also complains of easy bruising and excessive bleeding.  He denies abdominal pain, nausea, vomiting, heartburn, reflux, difficulty swallowing, early satiety, bloating, diarrhea, constipation or straining with passing stools.  He does admit to forgetting to eat when busy at work, but denies lack of appetite.      REVIEW OF SYSTEMS:    Review of Systems  Per HPI    Historical Information   Patient Active Problem List   Diagnosis    Type 2 diabetes mellitus without complication, without long-term current use of insulin (HCC)    Cirrhosis of liver without ascites (HCC)    Thrombocytopenia (HCC)    Bleeding disorder (HCC)    Shortness of breath    Edema of both ankles    Chronic cough    Urine discoloration    Splenomegaly    Dyslipidemia    Hives     Past Medical History:   Diagnosis Date    Cirrhosis (HCC)     Diabetes mellitus (HCC)     Low platelet count (HCC)     Spleen enlarged      History reviewed. No pertinent surgical history.  Social History   Social History     Substance and Sexual Activity   Alcohol Use Never     Social History     Substance and Sexual Activity   Drug Use Not Currently     Social History     Tobacco Use   Smoking Status Never    Passive exposure: Never   Smokeless Tobacco Never     History reviewed. No pertinent family  "history.    Meds/Allergies       Current Outpatient Medications:     atorvastatin (LIPITOR) 10 mg tablet    metFORMIN (GLUCOPHAGE-XR) 500 mg 24 hr tablet    Tranexamic Acid 650 MG TABS    Allergies   Allergen Reactions    Aspirin Other (See Comments)     Pt states per Doctor            Objective     Blood pressure 148/78, pulse (!) 123, temperature 98.6 °F (37 °C), temperature source Tympanic, height 5' 5\" (1.651 m), weight 65.3 kg (144 lb), SpO2 93%. Body mass index is 23.96 kg/m².        PHYSICAL EXAM:           Physical Exam  Vitals reviewed.   Constitutional:       General: He is not in acute distress.     Appearance: Normal appearance. He is not ill-appearing.   HENT:      Head: Normocephalic and atraumatic.      Nose:      Comments: Mild blood crusting on the inside of his nares     Mouth/Throat:      Pharynx: Oropharynx is clear. No posterior oropharyngeal erythema.   Eyes:      General: No scleral icterus.     Extraocular Movements: Extraocular movements intact.   Cardiovascular:      Rate and Rhythm: Regular rhythm. Tachycardia present.      Heart sounds: No murmur heard.  Pulmonary:      Effort: Pulmonary effort is normal. No respiratory distress.      Breath sounds: Normal breath sounds. No rales.   Abdominal:      General: There is no distension.      Palpations: Abdomen is soft. There is no shifting dullness, fluid wave, hepatomegaly or splenomegaly.      Tenderness: There is no abdominal tenderness. There is no guarding.   Musculoskeletal:         General: No swelling. Normal range of motion.      Cervical back: Normal range of motion and neck supple.   Skin:     General: Skin is warm.      Coloration: Skin is not jaundiced.      Findings: Bruising present.   Neurological:      General: No focal deficit present.      Mental Status: He is oriented to person, place, and time.   Psychiatric:         Mood and Affect: Mood normal.              Lab Results:   Lab Results   Component Value Date    SODIUM 140 " "02/16/2024    K 4.1 02/16/2024    BUN 7 02/16/2024    CREATININE 0.67 (L) 02/16/2024    TBILI 2.2 (H) 02/16/2024    ALB 3.6 (L) 02/16/2024    AST 49 (H) 02/16/2024    ALT 28 02/16/2024    WBC 3.09 (L) 02/26/2024    PLT 23 (L) 02/26/2024    HGB 13.3 02/26/2024    INR 1.3 (H) 02/16/2024     No results found for: \"AFP\"  No results found for: \"IRON\", \"FERRITIN\", \"CONCFE\", \"TRANSFERRIN\", \"ELIGIO\", \"SMOOTHMUSCAB\", \"AMAIFA\", \"IGG\", \"IGM\", \"CERULOPLSM\", \"AATPHENOTYPE\", \"HAV\", \"HEPBSAG\", \"HEPBSAB\", \"HEPBCAB\", \"HEPCAB\"  No results found for: \"HGBA1C\", \"LABLIPI\", \"TSH\"  MELD 3.0: 12 at 2/16/2024  2:41 PM  MELD-Na: 12 at 2/16/2024  2:41 PM  Calculated from:  Serum Creatinine: 0.67 mg/dL (Using min of 1 mg/dL) at 2/16/2024  2:41 PM  Serum Sodium: 140 mmol/L (Using max of 137 mmol/L) at 2/16/2024  2:41 PM  Total Bilirubin: 2.2 mg/dL at 2/16/2024  2:41 PM  Serum Albumin: 3.6 g/dL (Using max of 3.5 g/dL) at 2/16/2024  2:41 PM  INR(ratio): 1.3 at 2/16/2024  2:41 PM  Age at listing (hypothetical): 43 years  Sex: Male at 2/16/2024  2:41 PM      Radiology Results:   I have reviewed the results of any radiology studies performed within the last 90 days. This includes:      MRI abdomen w wo contrast    Result Date: 10/18/2023  Narrative: MRI - ABDOMEN - WITH AND WITHOUT CONTRAST INDICATION: 43 years / Male  K74.60: Unspecified cirrhosis of liver. COMPARISON: None TECHNIQUE:  Multiplanar/multisequence MRI of the abdomen was performed before and after administration of contrast. Imaging performed on 1.5T MRI IV Contrast:  7 mL of Gadobutrol injection (SINGLE-DOSE) FINDINGS: LOWER CHEST:   Unremarkable. LIVER: Cirrhotic morphology of the liver. No suspicious mass. The hepatic veins and portal veins are patent. BILE DUCTS: No intrahepatic or extrahepatic bile duct dilation. GALLBLADDER: A small amount of free fluid around the gallbladder. No gallbladder wall thickening or gallbladder calculi. Pericholecystic fluid is likely secondary to " cirrhosis.. PANCREAS:  Unremarkable. ADRENAL GLANDS:  Unremarkable. 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 No hydroureteronephrosis. No suspicious renal mass. BOWEL:   No dilated loops of bowel. PERITONEUM/RETROPERITONEUM: No mass.  No ascites. LYMPH NODES: No abdominal lymphadenopathy. VASCULAR STRUCTURES:  No aneurysm. Perisplenic, paraesophageal and gastrohepatic varices with a splenorenal shunt. ABDOMINAL WALL:  Unremarkable. OSSEOUS STRUCTURES:  No suspicious osseous lesion.     Impression: Cirrhosis, splenomegaly and varices compatible with portal hypertension. Small volume of free fluid in the gallbladder fossa, without gallbladder wall thickening or cholelithiasis, likely secondary to cirrhosis. Clinical correlation is recommended to exclude possibility of acute cholecystitis. 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 The study was marked in EPIC for significant notification. Workstation performed: [id number]     Echo complete w/ contrast if indicated    Result Date: 10/6/2023  Narrative:   Left Ventricle: Left ventricular cavity size is normal. Wall thickness is normal. The left ventricular ejection fraction is 65%. Systolic function is normal. Wall motion is normal. Diastolic function is normal.   Right Ventricle: Right ventricular cavity size is normal. Systolic function is normal.   No significant valvular abnormalities.         Arsenio Pyle MD  Hepatology/Gastroenterology

## 2024-02-29 ENCOUNTER — OFFICE VISIT (OUTPATIENT)
Dept: HEMATOLOGY ONCOLOGY | Facility: CLINIC | Age: 44
End: 2024-02-29
Payer: COMMERCIAL

## 2024-02-29 VITALS
DIASTOLIC BLOOD PRESSURE: 82 MMHG | BODY MASS INDEX: 24.16 KG/M2 | RESPIRATION RATE: 17 BRPM | SYSTOLIC BLOOD PRESSURE: 144 MMHG | HEIGHT: 65 IN | OXYGEN SATURATION: 90 % | HEART RATE: 109 BPM | WEIGHT: 145 LBS | TEMPERATURE: 98.3 F

## 2024-02-29 DIAGNOSIS — D69.6 THROMBOCYTOPENIA (HCC): ICD-10-CM

## 2024-02-29 DIAGNOSIS — D69.9 BLEEDING DISORDER (HCC): ICD-10-CM

## 2024-02-29 DIAGNOSIS — K74.60 CIRRHOSIS OF LIVER WITHOUT ASCITES, UNSPECIFIED HEPATIC CIRRHOSIS TYPE (HCC): Primary | ICD-10-CM

## 2024-02-29 DIAGNOSIS — R16.1 SPLENOMEGALY: ICD-10-CM

## 2024-02-29 DIAGNOSIS — E78.5 DYSLIPIDEMIA: ICD-10-CM

## 2024-02-29 DIAGNOSIS — D50.0 IRON DEFICIENCY ANEMIA DUE TO CHRONIC BLOOD LOSS: ICD-10-CM

## 2024-02-29 DIAGNOSIS — E11.9 TYPE 2 DIABETES MELLITUS WITHOUT COMPLICATION, WITHOUT LONG-TERM CURRENT USE OF INSULIN (HCC): ICD-10-CM

## 2024-02-29 DIAGNOSIS — R06.09 EXERTIONAL DYSPNEA: ICD-10-CM

## 2024-02-29 PROCEDURE — 99215 OFFICE O/P EST HI 40 MIN: CPT | Performed by: INTERNAL MEDICINE

## 2024-02-29 NOTE — PATIENT INSTRUCTIONS
Ordered blood work to be done anytime.  Please call our office and report to the emergency room in case of bleeding or any other emergencies.  Please let us know if you are going for any procedure.  Follow-up in 3 months.

## 2024-02-29 NOTE — PROGRESS NOTES
HPI: Continuation of care.  Follow-up visit for cirrhosis of liver with splenomegaly and thrombocytopenia.  Patient had bone marrow test at another hospital and that showed absent iron stain.  Otherwise unremarkable bone marrow.  Recently patient had extraction of 1 tooth.  He received platelet transfusions and TXA and oral surgeon applied local medication to prevent bleeding.  He still had some bleeding and had platelet transfusion again and continuation of TXA.  No more bleeding at present.   Occasionally he has small nosebleed during winter months.  He has chronic cough and exertional dyspnea.    Occasionally swelling of the ankles.   History of diabetes mellitus and dyslipidemia.  No history of smoking and drinking.  He is a .    Problem List and Plan from Dr. Arsenio Pyle:     Cirrhosis: Most MELD 3.0 Score is 11.  Etiology is not clear, however he has risk for KRUGER related cirrhosis and has positive autoimmune studies (ELIGIO and elevated IgG).  Liver biopsy would help determine etiology, however will need to address thrombocytopenia first.  He has no clinical indication for liver transplant at this time.  MELD score < 15.  Will plan to continue to check MELD labs every 3 months, imaging every 6 months and variceal screening every 1-2.  Will need routine office visit every 3-6 months depending on stability.  Thrombocytopenia/Pancytopenia:  Had BM biopsy recently.  I do not have the report.  I will refer him to our hematology team.  Hopefully they can review his BM biopsy as this degree of thrombocytopenia is not consistent with cirrhotic portal HTN alone.  He requires EGD/liver biopsy and I hope hematology can help us address possibly benefit of doptelet or mulpleta.  Volume: Ascites/Edema currently well controlled off diuretics.  Will continue sodium restricted diet and continue to monitor for the development of ascites/edema.  Hepatic Encephalopathy: No history of hepatic  encephalopathy.  Mr. Noel and his family/care giver are aware of the signs/symptoms of hepatic encephalopathy and understand to seek immediate medical attention should they arise..  Esophageal Varices: No known history of esophageal varices, however MRI shows periesophageal and perigastric varices.  Would like to schedule EGD, however will need severe thrombocytopenia addressed first as above.  Hepatoma Screening: Last abdominal imaging was an MRI done last month.  Will plan for repeat imaging in March.  Nutritional status: Mild sarcopenia suggest by physical exam.  Encouraged high protein snacking, low salt diet.  If weight loss evident, will refer to nutritional counseling.     ROS:  03/03/24 Reviewed 12 systems: See symptoms in HPI  Presently no other neurological, cardiac, pulmonary, GI and  symptoms other than listed in HPI.  Other symptoms are in HPI.  No other symptoms like fever, chills, bone pains, skin rash, night sweats, arthritic symptoms,  tiredness , weakness, numbness, claudication and gait problem. No frequent infections.  Not unusually sensitive to heat or cold.  No swollen glands.  Patient is anxious.       Historical Information   Past Medical History:   Diagnosis Date   • Cirrhosis (HCC)    • Diabetes mellitus (HCC)    • Low platelet count (HCC)    • Spleen enlarged      No past surgical history on file.  Social History   Social History     Substance and Sexual Activity   Alcohol Use Never     Social History     Substance and Sexual Activity   Drug Use Not Currently     Social History     Tobacco Use   Smoking Status Never   • Passive exposure: Never   Smokeless Tobacco Never     Family History: No family history on file.      Current Outpatient Medications:   •  atorvastatin (LIPITOR) 10 mg tablet, Take 10 mg by mouth daily, Disp: , Rfl:   •  metFORMIN (GLUCOPHAGE-XR) 500 mg 24 hr tablet, 500 mg, Disp: , Rfl:   •  Tranexamic Acid 650 MG TABS, Take 2 tablets (1,300 mg total) by mouth 2 (two)  "times a day For 5 days, Disp: 20 tablet, Rfl: 0    Allergies   Allergen Reactions   • Aspirin Other (See Comments)     Pt states per Doctor        Physical Exam:  Vitals:    02/29/24 1458   BP: 144/82   BP Location: Left arm   Patient Position: Sitting   Cuff Size: Adult   Pulse: (!) 109   Resp: 17   Temp: 98.3 °F (36.8 °C)   SpO2: 90%   Weight: 65.8 kg (145 lb)   Height: 5' 5\" (1.651 m)   Patient is alert and oriented.  Patient is not in distress.  Vital signs as above.  There is no icterus.  There is no oral thrush.  There is no bleeding in the oral cavity.  There is no palpable neck mass.  Lung fields are clear to percussion and auscultation.  Heart rate is regular.  No palpable abdominal mass.  Soft and nontender abdomen.  No ascites.  Has trace edema of the ankles.  No calf tenderness.  There is no focal neurological deficit, no skin rash, no palpable lymphadenopathy in the neck and axillary areas,  no clubbing.   Patient is anxious.  Performance status 1.      Lab Results: I have reviewed all pertinent labs.  LABS:    Results for orders placed or performed during the hospital encounter of 02/26/24   Prepare Leukoreduced Platelet Pheresis (1 pheresis product = 6-8 pooled units): 1 Product, Leukoreduced, Irradiated   Result Value Ref Range    Unit Product Code [id number]     Unit Number [id number]     Unit ABO O     Unit RH POS     Unit Dispense Status Presumed Trans     Unit Product Volume 246 mL   CBC and differential  Status: Final result      Contains abnormal data CBC and differential  Order: [id number]  Status: Final result       Visible to patient: Yes (seen)       Next appt: 03/07/2024 at 10:00 AM in Cardiology Imaging ([facility])       Dx: Bleeding disorder (HCC)    0 Result Notes      Component  Ref Range & Units 2/26/24  3:02 PM   WBC  4.31 - 10.16 Thousand/uL 3.09 Low    RBC  3.88 - 5.62 Million/uL 3.98   Hemoglobin  12.0 - 17.0 g/dL 13.3   Hematocrit  36.5 - 49.3 % 40.0   MCV  82 - 98 fL 101 High  "   MCH  26.8 - 34.3 pg 33.4   MCHC  31.4 - 37.4 g/dL 33.3   RDW  11.6 - 15.1 % 17.3 High    Platelets  149 - 390 Thousands/uL 23 Low    nRBC  /100 WBCs 0   Neutrophils Relative  43 - 75 % 68   Immat GRANS %  0 - 2 % 1   Lymphocytes Relative  14 - 44 % 22   Monocytes Relative  4 - 12 % 7   Eosinophils Relative  0 - 6 % 2   Basophils Relative  0 - 1 % 0   Neutrophils Absolute  1.85 - 7.62 Thousands/µL 2.10   Immature Grans Absolute  0.00 - 0.20 Thousand/uL 0.03   Lymphocytes Absolute  0.60 - 4.47 Thousands/µL 0.68   Monocytes Absolute  0.17 - 1.22 Thousand/µL 0.22   Eosinophils Absolute  0.00 - 0.61 Thousand/µL 0.05   Basophils Absolute  0.00 - 0.10 Thousands/µL 0.01            Result Notes      Component  Ref Range & Units 2/16/24  2:41 PM   Ammonia, Plasma  40 - 200 ug/dL 160                     Ref Range & Units 2/16/24  2:41 PM   Fibrinogen  193 - 507 mg/dL 187 Low                  0 Result Notes       Component  Ref Range & Units 2/16/24  2:41 PM 1/29/24  9:28 AM   aPTT  24 - 33 sec 31 33 CM                Component  Ref Range & Units 2/16/24  2:41 PM 1/29/24  9:28 AM   INR  0.9 - 1.2 1.3 High  1.3 High  CM   Comment: Reference interval is for non-anticoagulated patients.  Suggested INR therapeutic range for Vitamin K  antagonist therapy:     Standard Dose (moderate intensity                    therapeutic range):       2.0 - 3.0     Higher intensity therapeutic range       2.5 - 3.5   Prothrombin Time  9.1 - 12.0 sec 13.9 High  13.7 High                  Comprehensive metabolic panel  Status: Final result      Contains abnormal data Comprehensive metabolic panel  Order: [id number]  Status: Final result       Visible to patient: Yes (not seen)       Next appt: 03/07/2024 at 10:00 AM in Cardiology Imaging ([facility])    0 Result Notes       1  Topic      Component  Ref Range & Units 2/16/24  2:41 PM   Glucose, Random  70 - 99 mg/dL 127 High    BUN  6 - 24 mg/dL 7   Creatinine  0.76 - 1.27 mg/dL 0.67 Low     eGFR  >59 mL/min/1.73 120   SL AMB BUN/CREATININE RATIO  9 - 20 10   Sodium  134 - 144 mmol/L 140   Potassium  3.5 - 5.2 mmol/L 4.1   Chloride  96 - 106 mmol/L 106   CO2  20 - 29 mmol/L 19 Low    CALCIUM  8.7 - 10.2 mg/dL 9.4   Protein, Total  6.0 - 8.5 g/dL 7.5   Albumin  4.1 - 5.1 g/dL 3.6 Low    Globulin, Total  1.5 - 4.5 g/dL 3.9   Albumin/Globulin Ratio  1.2 - 2.2 0.9 Low    TOTAL BILIRUBIN  0.0 - 1.2 mg/dL 2.2 High    Alk Phos Isoenzymes  44 - 121 IU/L 105   AST  0 - 40 IU/L 49 High    ALT  0 - 44 IU/L 28                                 Imaging Studies: I have personally reviewed pertinent reports.      Study Result    Narrative & Impression         CHEST     INDICATION:   Shortness of breath. Chronic cough.      COMPARISON:  Comparison made with previous examination(s) dated (MR) 08-Oct-2023.     EXAM PERFORMED/VIEWS:  XR CHEST PA & LATERAL     FINDINGS:     Cardiomediastinal silhouette appears unremarkable. Eventration right hemidiaphragm     The lungs are clear.  No pneumothorax or pleural effusion.     Osseous structures appear within normal limits for patient age.     IMPRESSION:        No acute cardiopulmonary disease.  Small eventration right hemidiaphragm noted     Electronically signed: 02/16/2024 04:45 PM Julian Goodman MD     Imaging    XR chest pa & lateral (Order: [id number]) - 2/1  IMPRESSION:     Cirrhosis, splenomegaly and varices compatible with portal hypertension. Small volume of free fluid in the gallbladder fossa, without gallbladder wall thickening or cholelithiasis, likely secondary to cirrhosis. Clinical correlation is recommended to   exclude possibility of acute cholecystitis.     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     The study was marked in EPIC for significant notification.     Workstation performed: [id number]        Imaging    MRI abdomen w wo contrast (Order: [id number]) - 10/8/2023  Pathology, and Other Studies: I have personally reviewed pertinent reports.         Assessment and Plan:  See diagnoses, orders instructions below   Continuation of care.  Follow-up visit for cirrhosis of liver with splenomegaly and thrombocytopenia.  Patient had bone marrow test at another hospital and that showed absent iron stain.  Otherwise unremarkable bone marrow.  Recently patient had extraction of 1 tooth.  He received platelet transfusions and TXA and oral surgeon applied local medication to prevent bleeding.  He still had some bleeding and had platelet transfusion again and continuation of TXA.  No more bleeding at present.   Occasionally he has small nosebleed during winter months.  He has chronic cough and exertional dyspnea.    Occasionally swelling of the ankles.   History of diabetes mellitus and dyslipidemia.  No history of smoking and drinking.  He is a .    Problem List and Plan from Dr. Arsenio Pyle:     Cirrhosis: Most MELD 3.0 Score is 11.  Etiology is not clear, however he has risk for KRUGER related cirrhosis and has positive autoimmune studies (ELIGIO and elevated IgG).  Liver biopsy would help determine etiology, however will need to address thrombocytopenia first.  He has no clinical indication for liver transplant at this time.  MELD score < 15.  Will plan to continue to check MELD labs every 3 months, imaging every 6 months and variceal screening every 1-2.  Will need routine office visit every 3-6 months depending on stability.  Thrombocytopenia/Pancytopenia:  Had BM biopsy recently.  I do not have the report.  I will refer him to our hematology team.  Hopefully they can review his BM biopsy as this degree of thrombocytopenia is not consistent with cirrhotic portal HTN alone.  He requires EGD/liver biopsy and I hope hematology can help us address possibly benefit of doptelet or mulpleta.  Volume: Ascites/Edema currently well controlled off diuretics.  Will continue sodium restricted diet and continue to monitor for the development of  ascites/edema.  Hepatic Encephalopathy: No history of hepatic encephalopathy.  Mr. Noel and his family/care giver are aware of the signs/symptoms of hepatic encephalopathy and understand to seek immediate medical attention should they arise..  Esophageal Varices: No known history of esophageal varices, however MRI shows periesophageal and perigastric varices.  Would like to schedule EGD, however will need severe thrombocytopenia addressed first as above.  Hepatoma Screening: Last abdominal imaging was an MRI done last month.  Will plan for repeat imaging in March.  Nutritional status: Mild sarcopenia suggest by physical exam.  Encouraged high protein snacking, low salt diet.  If weight loss evident, will refer to nutritional counseling.   Physical examination and test results are as recorded and discussed in detail.  Patient will be taking TXA for another 2 days.  He will report to the emergency room in case of bleeding or blood clot.  Also he has my cell phone number to call.  Doptelet in preparation for procedures in the future.  He will have blood work for iron panel and depending on the results he may be tried on short course of iron therapy.  Patient has positive ELIGIO and could there be an element of ITP?  Trial of prednisone may be?  All discussed in detail.  Questions answered.  Goal is to prevent bleeding and blood clot.  Patient appears to be capable of self-care.     Patient will continue to follow with  primary physician and other consultants.  Patient voiced understanding and agrees        1. Cirrhosis of liver without ascites, unspecified hepatic cirrhosis type (HCC)      2. Bleeding disorder (HCC)      3. Thrombocytopenia (HCC)    - CBC and differential; Future    4. Splenomegaly      5. Type 2 diabetes mellitus without complication, without long-term current use of insulin (HCC)      6. Dyslipidemia      7. Exertional dyspnea      8. Iron deficiency anemia due to chronic blood loss    - CBC and  differential; Future  - Iron Panel (Includes Ferritin, Iron Sat%, Iron, and TIBC); Future  - Ferritin; Future  Addendum: #9-leukopenia      Ordered blood work to be done anytime.  Please call our office and report to the emergency room in case of bleeding or any other emergencies.  Please let us know if you are going for any procedure.  Follow-up in 3 months.              Disclaimer: This document was prepared using a dictation device. If a word or phrase is confusing, or does not make sense, this is likely due to recognition error which was not discovered during the providers review. If you believe an error has occurred, please Contact me through [facility]OPE Line service for deejay?cation.    Counseling / Coordination of Care  ..  Provided counseling and support

## 2024-03-07 ENCOUNTER — HOSPITAL ENCOUNTER (OUTPATIENT)
Dept: NON INVASIVE DIAGNOSTICS | Facility: HOSPITAL | Age: 44
Discharge: HOME | End: 2024-03-07
Attending: FAMILY MEDICINE

## 2024-04-16 ENCOUNTER — HOSPITAL ENCOUNTER (OUTPATIENT)
Dept: ULTRASOUND IMAGING | Facility: HOSPITAL | Age: 44
Discharge: HOME | End: 2024-04-16
Attending: INTERNAL MEDICINE
Payer: COMMERCIAL

## 2024-04-16 DIAGNOSIS — D61.818 PANCYTOPENIA (HCC): ICD-10-CM

## 2024-04-16 DIAGNOSIS — K74.60 CIRRHOSIS OF LIVER WITHOUT ASCITES, UNSPECIFIED HEPATIC CIRRHOSIS TYPE (HCC): ICD-10-CM

## 2024-04-16 DIAGNOSIS — I86.8 INTRA-ABDOMINAL VARICES: ICD-10-CM

## 2024-04-16 DIAGNOSIS — R60.0 LEG EDEMA: ICD-10-CM

## 2024-04-16 PROCEDURE — 76700 US EXAM ABDOM COMPLETE: CPT

## 2024-05-24 ENCOUNTER — TELEPHONE (OUTPATIENT)
Dept: HEMATOLOGY ONCOLOGY | Facility: CLINIC | Age: 44
End: 2024-05-24

## 2024-05-24 NOTE — TELEPHONE ENCOUNTER
Appointment Change  Cancel, Reschedule, Change to Virtual      Who are you speaking with? Patient   If it is not the patient, is the caller listed on the communication consent form? N/A   Which provider is the appointment scheduled with? Dr. Quesada   When was the original appointment scheduled?    Please list date and time 5/31/24 1020   At which location is the appointment scheduled to take place? [place]   Was the appointment rescheduled?     Was the appointment changed from an in person visit to a virtual visit?    If so, please list the details of the change. 7/18/24 320   What is the reason for the appointment change? labs       Was [facility] transport scheduled? N/A   Does [facility] transport need to be scheduled for the new visit (if applicable) N/A   Does the patient need an infusion appointment rescheduled? N/A   Does the patient have an upcoming infusion appointment scheduled? If so, when? No   Is the patient undergoing chemotherapy? Yes   For appointments cancelled with less than 24 hours:  Was the no-show policy reviewed? N/A

## 2024-06-03 ENCOUNTER — HOSPITAL ENCOUNTER (OUTPATIENT)
Dept: NON INVASIVE DIAGNOSTICS | Facility: HOSPITAL | Age: 44
Discharge: HOME | End: 2024-06-03
Attending: FAMILY MEDICINE
Payer: COMMERCIAL

## 2024-06-03 VITALS — HEIGHT: 65 IN | BODY MASS INDEX: 24.16 KG/M2 | WEIGHT: 145 LBS

## 2024-06-03 DIAGNOSIS — R06.00 DYSPNEA, UNSPECIFIED TYPE: ICD-10-CM

## 2024-06-03 LAB
CHEST PAIN STATEMENT: NORMAL
MAX DIASTOLIC BP: 62 MMHG
MAX HR PERCENT: 83 %
MAX HR: 148 BPM
MAX PREDICTED HEART RATE: 177 BPM
PROTOCOL NAME: NORMAL
RATE PRESSURE PRODUCT: NORMAL
REASON FOR TERMINATION: NORMAL
SL CV STRESS RECOVERY BP: NORMAL MMHG
SL CV STRESS RECOVERY HR: 118 BPM
SL CV STRESS RECOVERY O2 SAT: 93 %
SL CV STRESS STAGE REACHED: 2
STRESS ANGINA INDEX: 0
STRESS BASELINE BP: NORMAL MMHG
STRESS BASELINE HR: 114 BPM
STRESS DUKE TREADMILL SCORE: 3
STRESS O2 SAT REST: 93 %
STRESS PEAK HR: 148 BPM
STRESS POST ESTIMATED WORKLOAD: 4.8 METS
STRESS POST EXERCISE DUR MIN: 3 MIN
STRESS POST EXERCISE DUR MIN: 3 MIN
STRESS POST EXERCISE DUR SEC: 11 SEC
STRESS POST EXERCISE DUR SEC: 11 SEC
STRESS POST O2 SAT PEAK: 82 %
STRESS POST PEAK BP: 154 MMHG
STRESS POST PEAK HR: 148 BPM
STRESS POST PEAK SYSTOLIC BP: 180 MMHG
STRESS ST DEPRESSION: 0 MM
TARGET HR FORMULA: NORMAL
TEST INDICATION: NORMAL

## 2024-06-03 PROCEDURE — 93017 CV STRESS TEST TRACING ONLY: CPT

## 2024-06-03 PROCEDURE — 93018 CV STRESS TEST I&R ONLY: CPT | Performed by: INTERNAL MEDICINE

## 2024-06-03 PROCEDURE — 93016 CV STRESS TEST SUPVJ ONLY: CPT | Performed by: INTERNAL MEDICINE

## 2024-07-16 ENCOUNTER — TELEPHONE (OUTPATIENT)
Dept: HEMATOLOGY ONCOLOGY | Facility: CLINIC | Age: 44
End: 2024-07-16

## 2024-09-10 PROBLEM — R41.82 ALTERED MENTAL STATUS: Status: ACTIVE | Noted: 2024-01-01

## 2024-09-10 PROBLEM — I61.9 ICH (INTRACEREBRAL HEMORRHAGE) (HCC): Status: ACTIVE | Noted: 2024-01-01

## 2024-09-10 NOTE — TELEPHONE ENCOUNTER
Provider: Dr. Quesada    Patient's wife calling in, requesting to speak to Dr. Quesada. I made her aware that it is end of day and that Dr. Quesada is finishing up seeing patient's in clinic and then will be done for the day; I asked her if she can discuss with me what this is about and I can make sure he gets the message.    She wanted to make sure Dr. Quesada is aware that patient is in the hospital in [place] and that he is able to see everything in his mychart. I confirmed with her that as long as patient is within [facility] we are able to see what is going on with the patient.    I instructed her to request a consult with med/onc from the inpatient provider if she is interested to hear from med/onc's perspective for patient. She said she was also told to call patient's PCP to make them aware patient is in the hospital with intracerebral hemorrhage, I gave her the PCP information I was able to see on patient's storyboard, along with the phone number listed with it.      She is requesting a call back from Dr. Quesada, if he has any additional recommendations or comments on patient's condition in the hospital.  I made her aware we would get this information over to Dr. Quesada. She verbalized understanding and has no additional questions or concerns at this moment.

## 2024-09-10 NOTE — RESPIRATORY THERAPY NOTE
RT Protocol Note  Sid Noel 44 y.o. male MRN: [id number]  Unit/Bed#: ICU 13 Encounter: [id number]    Assessment    Active Problems:  There are no active Hospital Problems.      Home Pulmonary Medications:  none       Past Medical History:   Diagnosis Date    Cirrhosis (HCC)     Diabetes mellitus (HCC)     Low platelet count (HCC)     Spleen enlarged      Social History     Socioeconomic History    Marital status: /Civil Union     Spouse name: Not on file    Number of children: Not on file    Years of education: Not on file    Highest education level: Not on file   Occupational History    Not on file   Tobacco Use    Smoking status: Never     Passive exposure: Never    Smokeless tobacco: Never   Vaping Use    Vaping status: Not on file   Substance and Sexual Activity    Alcohol use: Never    Drug use: Not Currently    Sexual activity: Not on file   Other Topics Concern    Not on file   Social History Narrative    Not on file     Social Determinants of Health     Financial Resource Strain: Not on file   Food Insecurity: Not on file   Transportation Needs: Not on file   Physical Activity: Not on file   Stress: Not on file   Social Connections: Not on file   Intimate Partner Violence: Not on file   Housing Stability: Not on file       Subjective         Objective    Physical Exam:   Assessment Type: Assess only  General Appearance: Sedated  Respiratory Pattern: Assisted  Chest Assessment: Chest expansion symmetrical  Bilateral Breath Sounds: Rhonchi  Cough: Productive  Suction: ET Tube    Vitals:  There were no vitals taken for this visit.          Imaging and other studies:           Plan    Respiratory Plan: Vent/NIV/HFNC        Resp Comments: (P) Pt. presents from OSH for Neurologic evaluation of Large right IPH. Pt. [facility] ICU 13 intubated with an 8.0 ETT, 26@ lip secured with a commercial ETT oliveira. BBS present. Pt. placed on PCV settings, tolerating well. Pt. does not have any pulm hx/meds. CXR  obtained, results pending. Will monitor per RCP.

## 2024-09-10 NOTE — TELEPHONE ENCOUNTER
Pt's wife to inform Dr. Quesada that pt is in the hospital. She asked for a call back to discuss his condition

## 2024-09-10 NOTE — RESPIRATORY THERAPY NOTE
RT Ventilator Management Note  Sid Noel 44 y.o. male MRN: [id number]  Unit/Bed#: ICU 13 Encounter: [id number]      Daily Screen         9/10/2024  0814             Patient safety screen outcome:: Failed (P)     Not Ready for Weaning due to:: Underline problem not resolved (P)               Physical Exam:   Assessment Type: Assess only  General Appearance: Sedated  Respiratory Pattern: Assisted  Chest Assessment: Chest expansion symmetrical  Bilateral Breath Sounds: Rhonchi  Cough: Productive  Suction: ET Tube  O2 Device: G5      Resp Comments: (P) Pt on vent unresponsive. Pt suctioned. BS clear. Will continue to monitor pt.

## 2024-09-10 NOTE — OCCUPATIONAL THERAPY NOTE
Occupational Therapy         Patient Name: Sid Noel  Today's Date: 9/10/2024           09/10/24 1000   OT Last Visit   OT Visit Date 09/10/24   Note Type   Note type Cancelled Session   Cancel Reasons Intubated/sedated   Additional Comments will defer       Flora Jimenez

## 2024-09-10 NOTE — CONSULTS
e-Consult (IPC)     Consults     Contacted by Dr. Johnson and Dr Longo.    Sid Noel 44 y.o. male MRN: [id number]  Unit/Bed#: ED-27 Encounter: [id number]    Reason for Consult    Per provider report, patient presents with Large right intraparenchymal hemorrhage. Available past medical history,social history, surgical history, medication list, drug allergies and review of systems were reviewed.    HPI:   45 yo man with cryptogenic cirrhosis, thrombocytopenia out of proportion to cirrhosis (plts < 20), minimal responsiveness to transfusions per notes, hyperlipidemia and diabetes mellitus type 2 presented to ED unconscious. Emesis noted on presentation. Patient had unequal pupils, extensor posturing on left, some non-purposeful movement of right arm and leg. He was intubated for airway protection. CTH shows large hemorrhage on R parietal lobe and R milan hemorrhage with extensive IVH.    /77 (BP Location: Right arm)   Pulse 58   Temp 97.7 °F (36.5 °C) (Bladder)   Resp (!) 32   Wt 63.9 kg (140 lb 14 oz)   SpO2 100%   BMI 23.44 kg/m²  .    Imaging personally reviewed. CTH with large R parietal IPH with IVH, obliteration of 3rd and 4th ventricle    ICH score 4     Assessment and Recommendations    1. Obtain CTA head and neck  2. Transfuse platelets and give 1 gm TXA  3. -160  4. Frequent neuro checks    Case reviewed with neurosurgeon on call. No surgical intervention would be safe if platelet count is < 50. Will bring patient here for ongoing medical management, reassess plt count after transfusion.    All questions answered. Provider is in agreement with the course of action. 11-20 minutes, >50% of the total time devoted to medical consultative verbal/EMR discussion between providers. Written report will be generated in the EMR.

## 2024-09-10 NOTE — PLAN OF CARE
Problem: Nutrition  Goal: Nutrition/Hydration status is improving  Description: Monitor and assess patient's nutrition/hydration status for malnutrition (ex- brittle hair, bruises, dry skin, pale skin and conjunctiva, muscle wasting, smooth red tongue, and disorientation). Collaborate with interdisciplinary team and initiate plan and interventions as ordered.  Monitor patient's weight and dietary intake as ordered or per policy. Utilize nutrition screening tool and intervene per policy. Determine patient's food preferences and provide high-protein, high-caloric foods as appropriate.     - Assist patient with eating.  - Allow adequate time for meals.  - Encourage patient to take dietary supplement as ordered.  - Collaborate with clinical nutritionist.  - Include patient/family/caregiver in decisions related to nutrition.  Outcome: Not Progressing

## 2024-09-10 NOTE — SPEECH THERAPY NOTE
Speech Language/Pathology  Speech-Language Pathology Hold      Patient Name: Sid Noel    Today's Date: 9/10/2024     Problem List  Active Problems:    Type 2 diabetes mellitus without complication, without long-term current use of insulin (HCC)    Cirrhosis of liver without ascites (HCC)    Thrombocytopenia (HCC)    ICH (intracerebral hemorrhage) (HCC)      Past Medical History  Past Medical History:   Diagnosis Date    Cirrhosis (HCC)     Diabetes mellitus (HCC)     Low platelet count (HCC)     Spleen enlarged        Past Surgical History  No past surgical history on file.    Summary   Pt remains intubated.  Please re consult when extubated.        Current Medical Status  Pt is a 44 y.o. male who presented to [facility] as a transfer from [facility] with change in MS-found unresponsive in bed gurgling on his vomit.  MS gave one dose nasal narcan and one dose IV narcan in route without change of condition. Upon arrival, pt was actively vomiting and covered in vomit with unequal pupils, right greater than left, some RUE and RLE movement to pain, and showing decorticate posturing on the left. Pt was intubated for airway protection. Not safe for surgery 2* risk of bleeding.    Pt w/ R frontal parietal IPH, extensive IVH and subfalcine herniation w/ mass effect.        CT head-Large right frontoparietal intraparenchymal hemorrhage, increased with progression of associated intraventricular hemorrhage. Close interval follow-up is recommended.     Stable mass effect with midline shift .There is again partial effacement of the suprasellar and quadrigeminal plate cisterns.     Increased ventriculomegaly, most notable at the occipital and temporal horn regions.

## 2024-09-10 NOTE — H&P
[facility]  H&P: Critical Care  Name: Sid Noel 44 y.o. male I MRN: [id number]  Unit/Bed#: ICU 13 I Date of Admission: 9/10/2024   Date of Service: 9/10/2024 I Hospital Day: 0      Assessment & Plan   Neuro:   Diagnosis: IPH with IVH extension and mass effect  CTH-Extensive intracranial hemorrhage with significant mass effect, right to left midline shift, and subfalcine herniation as detailed above.   Plan:   Neurosx consulted  SBP<140  Plts >100  Bolus 3% start 3% NSS for Na 145-155 and Sosm 310-320  CTH in 6hrs or with any change in GCS>2 pts  Neuro checks    Diagnosis: Encephalopathy 2/2 above  Plan:   Neuro checks  Propofol gtt  CV:   Diagnosis: HLD  Plan:   Hold home statin    Pulm:  Diagnosis: acute resp failure 2/2 mental status  Plan:   Check cxr for ett placement  PC 18/12/6/40 wean for sats> 92%  Check ABG    GI:   Diagnosis: Hx cirrhosis/varices/portal htn  No biopsy 2/2 low plts  Follows with Dr. Pyle  Varices seen on MRI no EGD completed  Plan:   Monitor for s/s bleeding    :   No active issues Maintain Lorenz    F/E/N:    Diagnosis: Metabolic acidosis   Plan:   Check LA/ABG/BHB    Heme/Onc:   Diagnosis: thrombocytopenia 2/2 cirrhosis  Prior BM biopsy no reports available  Plan:   Transfuse for plts > 100 given ICH  TEG as below will give DDAVP    Endo:   Diagnosis: DM2  Plan:   Check A1c  Hold home metformin  SSI    ID:   Diagnosis: leukopenia chronic 2/2 cirrhosis  Plan:   No s/s infection  monitor    MSK/Skin:   No active issues    Disposition: Critical care       History of Present Illness     HPI: Sid Noel is a 44 y.o. with PMH cirrhosis/portal htn/varices, DM2, HLD who presents with change in mental status. Per wife last normal in bed at 2330 then vomited and unresponsive. EMS called. Intubated on arrival to [facility]. CTH revealed large IPH with IVH and mass effect/shift/herniation. Given TXA and 1U Plts    History obtained from chart review and  unobtainable from patient due to mental status.  Review of Systems: Review of Systems   Unable to perform ROS: Intubated       Historical Information   Past Medical History:  No date: Cirrhosis (HCC)  No date: Diabetes mellitus (HCC)  No date: Low platelet count (HCC)  No date: Spleen enlarged No past surgical history on file.   Current Outpatient Medications   Medication Instructions    atorvastatin (LIPITOR) 10 mg, Oral, Daily    metFORMIN (GLUCOPHAGE-XR) 500 mg    Tranexamic Acid 1,300 mg, Oral, 2 times daily, For 5 days    Allergies   Allergen Reactions    Aspirin Other (See Comments)     Pt states per Doctor       Social History     Tobacco Use    Smoking status: Never     Passive exposure: Never    Smokeless tobacco: Never   Substance Use Topics    Alcohol use: Never    Drug use: Not Currently    No family history on file.       Objective                            Vitals I/O      Most Recent Min/Max in 24hrs   Temp   Temp  Min: 97.7 °F (36.5 °C)  Max: 98.2 °F (36.8 °C)   Pulse   Pulse  Min: 58  Max: 103   Resp   Resp  Min: 14  Max: 35   BP   BP  Min: 118/71  Max: 210/111   O2 Sat   SpO2  Min: 93 %  Max: 100 %    No intake or output data in the 24 hours ending 09/10/24 0255    Diet NPO    Invasive Monitoring           Physical Exam   Physical Exam  Eyes:      Comments: R5NR L3NR   Skin:     General: Skin is warm and dry.   HENT:      Head: Normocephalic and atraumatic.      Mouth/Throat:      Mouth: Mucous membranes are moist.   Cardiovascular:      Rate and Rhythm: Normal rate and regular rhythm.      Pulses: Normal pulses.      Heart sounds: Normal heart sounds.   Musculoskeletal:         General: No swelling.      Right lower leg: No edema.      Left lower leg: No edema.   Abdominal: General: Bowel sounds are normal. There is no distension.      Palpations: Abdomen is soft.   Constitutional:       Interventions: He is sedated, intubated and restrained.   Pulmonary:      Effort: Tachypnea present. No  respiratory distress. He is intubated.      Breath sounds: Rhonchi present.   Neurological:      GCS: GCS eye subscore is 1. GCS verbal subscore is 1. GCS motor subscore is 3.            Diagnostic Studies      EKG: sinus arrythmia no acute st changes  Imaging:  I have personally reviewed pertinent reports.   and I have personally reviewed pertinent films in PACS     Medications:  Scheduled PRN   chlorhexidine, 15 mL, Q12H DAYANA  insulin lispro, 1-5 Units, Q6H DAYANA  sodium chloride, 250 mL, Once          Continuous    propofol, 5-50 mcg/kg/min, Last Rate: 30 mcg/kg/min (09/10/24 0232)  sodium chloride, 30 mL/hr         Labs:    CBC    Recent Labs     09/10/24  0120   WBC 3.06*   HGB 12.5   HCT 36.1*   PLT 33*   BANDSPCT 1     BMP    Recent Labs     09/10/24  0031   SODIUM 138   K 3.7   *   CO2 19*   AGAP 10   BUN 8   CREATININE 0.61   CALCIUM 8.2*       Coags    Recent Labs     09/10/24  0031   INR 1.43*   PTT 37*        Additional Electrolytes  Recent Labs     09/10/24  0228   CAIONIZED 1.05*          Blood Gas    No recent results  No recent results LFTs  Recent Labs     09/10/24  0031   ALT 22   AST 46*   ALKPHOS 112*   ALB 3.1*   TBILI 2.10*       Infectious  No recent results  Glucose  Recent Labs     09/10/24  0031   GLUC 202*               VIANCA Zaldivar

## 2024-09-10 NOTE — ASSESSMENT & PLAN NOTE
Lab Results   Component Value Date    HGBA1C 5.6 09/10/2024       Recent Labs     09/09/24  2359 09/10/24  0237 09/10/24  0631   POCGLU 203* 203* 171*       Blood Sugar Average: Last 72 hrs:  (P) 187  Management per primary team, currently on SSI

## 2024-09-10 NOTE — PROCEDURES
Central Line Insertion    Date/Time: 9/10/2024 5:08 AM    Performed by: VIANCA Zaldivar  Authorized by: VIANCA Zaldivar    Patient location:  ICU  Other Assisting Provider: No    Consent:     Consent obtained:  Emergent situation  Universal protocol:     Immediately prior to procedure, a time out was called: yes      Patient identity confirmed:  Arm band  Pre-procedure details:     Hand hygiene: Hand hygiene performed prior to insertion      Sterile barrier technique: All elements of maximal sterile technique followed      Skin preparation:  2% chlorhexidine    Skin preparation agent: Skin preparation agent completely dried prior to procedure    Indications:     Central line indications: medications requiring central line    Anesthesia (see MAR for exact dosages):     Anesthesia method:  Local infiltration    Local anesthetic:  Lidocaine 1% w/o epi  Procedure details:     Location:  Right internal jugular    Vessel type: vein      Laterality:  Right    Approach: percutaneous technique used      Catheter type:  Triple lumen 16cm    Catheter size:  7.5 Fr    Landmarks identified: yes      Ultrasound guidance: yes      Ultrasound image availability:  Images available in PACS    Sterile ultrasound techniques: Sterile gel and sterile probe covers were used      Manometry confirmation: yes      Number of attempts:  1    Successful placement: yes      Catheter tip vessel location: atriocaval junction    Post-procedure details:     Post-procedure:  Dressing applied and line sutured    Assessment:  No pneumothorax on x-ray    Post-procedure complications: none      Patient tolerance of procedure:  Tolerated well, no immediate complications    Observer: Yes      Observer name:  Dorothy YUAN

## 2024-09-10 NOTE — CASE MANAGEMENT
Case Management Assessment & Discharge Planning Note    Patient name Sid Noel  Location ICU 13/ICU 13 MRN [id number]  : 1980 Date 9/10/2024       Current Admission Date: 9/10/2024  Current Admission Diagnosis:ICH (intracerebral hemorrhage) (HCC)   Patient Active Problem List    Diagnosis Date Noted Date Diagnosed    ICH (intracerebral hemorrhage) (HCC) 09/10/2024     Exertional dyspnea 2024     Iron deficiency anemia due to chronic blood loss 2024     Hives 2024     Type 2 diabetes mellitus without complication, without long-term current use of insulin (HCC) 2024     Cirrhosis of liver without ascites (HCC) 2024     Thrombocytopenia (HCC) 2024     Bleeding disorder (HCC) 2024     Shortness of breath 2024     Edema of both ankles 2024     Chronic cough 2024     Urine discoloration 2024     Splenomegaly 2024     Dyslipidemia 2024       LOS (days): 0  Geometric Mean LOS (GMLOS) (days):   Days to GMLOS:     OBJECTIVE:    Risk of Unplanned Readmission Score: 13.45     Current admission status: Inpatient    Preferred Pharmacy:   UNKNOWN - FOLLOW UP PRIOR TO DISCHARGE TO E-PRESCRIBE  No address on file      [id number] [address] [facility] [address]  Phone: [phone] Fax: [phone]    [id number] [place] - [address]  [address] [facility] [address]  Phone: [phone] Fax: [phone]    Primary Care Provider: Maicol Morris MD    Primary Insurance: BLUE CROSS  Secondary Insurance:     ASSESSMENT:  Active Health Care Proxies       Keith Calvin [facility] Care Representative - Spouse   Primary Phone: [phone] (Mobile)                 Patient Information  Admitted from:: Home  Mental Status: Intubated  Assessment information provided by:: Spouse  Primary Caregiver: Self  Support Systems: Self,  Spouse/significant other, Family members, Son  Home entry access options. Select all that apply.: Stairs  Number of steps to enter home.: 2  Type of Current Residence: 2 story home  Upon entering residence, is there a bedroom on the main floor (no further steps)?: No  A bedroom is located on the following floor levels of residence (select all that apply):: 2nd Floor  Upon entering residence, is there a bathroom on the main floor (no further steps)?: No  Indicate which floors of current residence have a bathroom (select all the apply):: 2nd Floor  Number of steps to 2nd floor from main floor: One Flight  Living Arrangements: Lives w/ Spouse/significant other, Lives w/ Son    Activities of Daily Living Prior to Admission  Functional Status: Independent  Completes ADLs independently?: Yes  Ambulates independently?: Yes  Does patient use assisted devices?: No  Does patient currently own DME?: No  Does patient have a history of Outpatient Therapy (PT/OT)?: No  Does the patient have a history of Short-Term Rehab?: No  Does patient have a history of HHC?: No  Does patient currently have HHC?: No    Patient Information Continued  Does patient have prescription coverage?: Yes  Does patient have a history of substance abuse?: No  Does patient have a history of Mental Health Diagnosis?: No    Means of Transportation  Means of Transport to Appts:: Drives Self      Social Determinants of Health (SDOH)      Flowsheet Row Most Recent Value   Housing Stability    In the last 12 months, was there a time when you were not able to pay the mortgage or rent on time? N   In the past 12 months, how many times have you moved where you were living? 0   At any time in the past 12 months, were you homeless or living in a shelter (including now)? N   Transportation Needs    In the past 12 months, has lack of transportation kept you from medical appointments or from getting medications? no   In the past 12 months, has lack of transportation  kept you from meetings, work, or from getting things needed for daily living? No   Food Insecurity    Within the past 12 months, you worried that your food would run out before you got the money to buy more. Never true   Within the past 12 months, the food you bought just didn't last and you didn't have money to get more. Never true   Utilities    In the past 12 months has the electric, gas, oil, or water company threatened to shut off services in your home? No            DISCHARGE DETAILS:    Contacts  Patient Contacts: Keith Calvin (Spouse)  Relationship to Patient:: Family  Contact Method: Phone  Phone Number: [phone] (M)  Reason/Outcome: Continuity of Care, Emergency Contact    Other Referral/Resources/Interventions Provided:  Referral Comments: CM spoke with pt's spouse in order to introduce CM role and complete CM assessment. PTA, pt resides with spouse and son in a two story home with 2 KATIE. Pt IPTA with no use of DME or HHC/STR hx. Pt still driving PTA. CM team will continue to follow and remain available as appropriate.

## 2024-09-10 NOTE — ASSESSMENT & PLAN NOTE
Right frontal parietal large IPH, extensive IVH and subfalcine herniation with significant mass effect and 1.1 cm midline shift   Presented in coma state.  Patient overnight had continuous vomiting and AMS when EMS was called  History of thrombocytopenia, baseline platelets 20-30 K.  Patient given DDAVP and TXA emergently. Also 2 units of platelets    Intracerebral Hemorrhage (ICH) Score:    Jovanny Coma Sore 5-12 equals +1   Age greater than or equal to 80 No   ICH Volume greater than or equal to 30 ml Yes (1 Point)   Intraventricular Hemorrhage Yes (1 Point)   Infratentorial Origin of Hemorrhage Yes (1 Point)   Total: 4     Imaging reviewed with :    CT head wo 9/10/24:Extensive intracranial hemorrhage with significant mass effect, right to left midline shift, and subfalcine herniation as detailed above.    CTA head and neck w/wo 9/10/24:CT Brain: No significant change in extensive intracranial hemorrhage.CT Angiography:  Unremarkable CTA neck and brain without large vessel arterial occlusion, significant flow-limiting stenosis, or focal saccular aneurysm. No active contrast extravasation/active high flow arterial hemorrhaging within the brain or right   cerebral hemispheric parenchymal hematoma.    CT head wo 9/10/24:read pending today    Plan:  Continue frequent neurological checks.   Case and imaging dicussed with , due to large IPH/IVH with evidence of herniation in the setting of thrombocytopenia.Injury with no meaningful recovery, platelets 67 not safe to consider surgery.  Also given high ICH score of 4 with risk for high mortality rate.  No neurosurgical intervention recommended.  STAT CT head with any neurological decline including drop GCS of 2pts within 1 hr.  Recommend SBP <160mmHg.  Seizure prophylaxis per trauma team  Hold all antiplatelet and anticoagulation medications.   Medical management and pain control per primary team  Platelets this morning 67, recommend greater than 100.   Transfuse per primary team  Sodium 142, was given 250 mL 3% bolus followed by 3% at 50 mL/hour  DVT PPX: SCDs   Recommend goals of care discussion with family    Neurosurgery will sign off, call with any further question or concerns.

## 2024-09-10 NOTE — ASSESSMENT & PLAN NOTE
History of cirrhosis with severe thrombocytopenia   Follows with Dr. Pyle as an outpatient with GI, unable to undergo liver biopsy secondary to severe thrombocytopenia.  MRI showed esophageal varices  Unfortunately unable to undergo EGD secondary to severe thrombocytopenia.

## 2024-09-10 NOTE — EMTALA/ACUTE CARE TRANSFER
[facility] EMERGENCY DEPARTMENT  [address] [address]  Dept: [phone]      EMTALA TRANSFER CONSENT    NAME Sid ROBERTS 1980                              MRN [id number]    I have been informed of my rights regarding examination, treatment, and transfer   by Dr. Angeline Johnson MD    Benefits: Specialized equipment and/or services available at the receiving facility (Include comment)________________________ (Neurosurgery)    Risks: Potential deterioration of medical condition, Possible worsening of condition or death during transfer, Loss of IV, Increased discomfort during transfer      Transfer Request   I acknowledge that my medical condition has been evaluated and explained to me by the emergency department physician or other qualified medical person and/or my attending physician who has recommended and offered to me further medical examination and treatment. I understand the Hospital's obligation with respect to the treatment and stabilization of my emergency medical condition. I nevertheless request to be transferred. I release the Hospital, the doctor, and any other persons caring for me from all responsibility or liability for any injury or ill effects that may result from my transfer and agree to accept all responsibility for the consequences of my choice to transfer, rather than receive stabilizing treatment at the Hospital. I understand that because the transfer is my request, my insurance may not provide reimbursement for the services.  The Hospital will assist and direct me and my family in how to make arrangements for transfer, but the hospital is not liable for any fees charged by the transport service.  In spite of this understanding, I refuse to consent to further medical examination and treatment which has been offered to me, and request transfer to Accepting Facility Name, City & State : \[facility]\"". I authorize the  performance of emergency medical procedures and treatments upon me in both transit and upon arrival at the receiving facility.  Additionally, I authorize the release of any and all medical records to the receiving facility and request they be transported with me, if possible.    I authorize the performance of emergency medical procedures and treatments upon me in both transit and upon arrival at the receiving facility.  Additionally, I authorize the release of any and all medical records to the receiving facility and request they be transported with me, if possible.  I understand that the safest mode of transportation during a medical emergency is an ambulance and that the Hospital advocates the use of this mode of transport. Risks of traveling to the receiving facility by car, including absence of medical control, life sustaining equipment, such as oxygen, and medical personnel has been explained to me and I fully understand them.    (LIBRA CORRECT BOX BELOW)  [  ]  I consent to the stated transfer and to be transported by ambulance/helicopter.  [  ]  I consent to the stated transfer, but refuse transportation by ambulance and accept full responsibility for my transportation by car.  I understand the risks of non-ambulance transfers and I exonerate the Hospital and its staff from any deterioration in my condition that results from this refusal.    X___________________________________________    DATE  09/10/24  TIME________  Signature of patient or legally responsible individual signing on patient behalf           RELATIONSHIP TO PATIENT_________________________          Provider Certification    NAME Sid Noel                                        [facility] 1980                              MRN [id number]    A medical screening exam was performed on the above named patient.  Based on the examination:    Condition Necessitating Transfer The encounter diagnosis was ICH (intracerebral hemorrhage) (HCC).    Patient  Condition: The patient has been stabilized such that within reasonable medical probability, no material deterioration of the patient condition or the condition of the unborn child(angel) is likely to result from the transfer    Reason for Transfer: Level of Care needed not available at this facility    Transfer Requirements: Facility SLB   Space available and qualified personnel available for treatment as acknowledged by    Agreed to accept transfer and to provide appropriate medical treatment as acknowledged by       William  Appropriate medical records of the examination and treatment of the patient are provided at the time of transfer   STAFF INITIAL WHEN COMPLETED _______  Transfer will be performed by qualified personnel from    and appropriate transfer equipment as required, including the use of necessary and appropriate life support measures.    Provider Certification: I have examined the patient and explained the following risks and benefits of being transferred/refusing transfer to the patient/family:  General risk, such as traffic hazards, adverse weather conditions, rough terrain or turbulence, possible failure of equipment (including vehicle or aircraft), or consequences of actions of persons outside the control of the transport personnel, Unanticipated needs of medical equipment and personnel during transport, Risk of worsening condition, The possibility of a transport vehicle being unavailable      Based on these reasonable risks and benefits to the patient and/or the unborn child(angel), and based upon the information available at the time of the patient’s examination, I certify that the medical benefits reasonably to be expected from the provision of appropriate medical treatments at another medical facility outweigh the increasing risks, if any, to the individual’s medical condition, and in the case of labor to the unborn child, from effecting the transfer.    X____________________________________________  DATE 09/10/24        TIME_______      ORIGINAL - SEND TO MEDICAL RECORDS   COPY - SEND WITH PATIENT DURING TRANSFER

## 2024-09-10 NOTE — PLAN OF CARE
Problem: PAIN - ADULT  Goal: Verbalizes/displays adequate comfort level or baseline comfort level  Description: Interventions:  - Encourage patient to monitor pain and request assistance  - Assess pain using appropriate pain scale  - Administer analgesics based on type and severity of pain and evaluate response  - Implement non-pharmacological measures as appropriate and evaluate response  - Consider cultural and social influences on pain and pain management  - Notify physician/advanced practitioner if interventions unsuccessful or patient reports new pain  Outcome: Progressing     Problem: INFECTION - ADULT  Goal: Absence or prevention of progression during hospitalization  Description: INTERVENTIONS:  - Assess and monitor for signs and symptoms of infection  - Monitor lab/diagnostic results  - Monitor all insertion sites, i.e. indwelling lines, tubes, and drains  - Monitor endotracheal if appropriate and nasal secretions for changes in amount and color  - [place] appropriate cooling/warming therapies per order  - Administer medications as ordered  - Instruct and encourage patient and family to use good hand hygiene technique  - Identify and instruct in appropriate isolation precautions for identified infection/condition  Outcome: Progressing  Goal: Absence of fever/infection during neutropenic period  Description: INTERVENTIONS:  - Monitor WBC    Outcome: Progressing     Problem: SAFETY ADULT  Goal: Patient will remain free of falls  Description: INTERVENTIONS:  - Educate patient/family on patient safety including physical limitations  - Instruct patient to call for assistance with activity   - Consult OT/PT to assist with strengthening/mobility   - Keep Call bell within reach  - Keep bed low and locked with side rails adjusted as appropriate  - Keep care items and personal belongings within reach  - Initiate and maintain comfort rounds  - Make Fall Risk Sign visible to staff  - Offer Toileting every 2 Hours,  in advance of need  - Initiate/Maintain bed alarm  - Obtain necessary fall risk management equipment  - Apply yellow socks and bracelet for high fall risk patients  - Consider moving patient to room near nurses station  Outcome: Progressing  Goal: Maintain or return to baseline ADL function  Description: INTERVENTIONS:  -  Assess patient's ability to carry out ADLs; assess patient's baseline for ADL function and identify physical deficits which impact ability to perform ADLs (bathing, care of mouth/teeth, toileting, grooming, dressing, etc.)  - Assess/evaluate cause of self-care deficits   - Assess range of motion  - Assess patient's mobility; develop plan if impaired  - Assess patient's need for assistive devices and provide as appropriate  - Encourage maximum independence but intervene and supervise when necessary  - Involve family in performance of ADLs  - Assess for home care needs following discharge   - Consider OT consult to assist with ADL evaluation and planning for discharge  - Provide patient education as appropriate  Outcome: Progressing  Goal: Maintains/Returns to pre admission functional level  Description: INTERVENTIONS:  - Perform AM-PAC 6 Click Basic Mobility/ Daily Activity assessment daily.  - Set and communicate daily mobility goal to care team and patient/family/caregiver.   - Collaborate with rehabilitation services on mobility goals if consulted  - Perform Range of Motion 3 times a day.  - Reposition patient every 2 hours.  - Dangle patient 3 times a day  - Stand patient 3 times a day  - Ambulate patient 3 times a day  - Out of bed to chair 3 times a day   - Out of bed for meals 3 times a day  - Out of bed for toileting  - Record patient progress and toleration of activity level   Outcome: Progressing     Problem: Neurological Deficit  Goal: Neurological status is stable or improving  Description: Interventions:  - Monitor and assess patient's level of consciousness, motor function, sensory  function, and level of assistance needed for ADLs.   - Monitor and report changes from baseline. Collaborate with interdisciplinary team to initiate plan and implement interventions as ordered.   - Provide and maintain a safe environment.  - Consider seizure precautions.  - Consider fall precautions.  - Consider aspiration precautions.  - Consider bleeding precautions.  Outcome: Progressing

## 2024-09-10 NOTE — CONSULTS
Consultation - Neurosurgery   Name: Sid Noel 44 y.o. male I MRN: [id number]  Unit/Bed#: ICU 13 I Date of Admission: 9/10/2024   Date of Service: 9/10/2024 I Hospital Day: 0   Inpatient consult to Neurosurgery  Consult performed by: VIANCA Duckworth  Consult ordered by: VIANCA Zaldivar        Primary Service: Anesthesiology  Physician Requesting Evaluation: Anushka Sumner MD     Assessment & Plan  ICH (intracerebral hemorrhage) (HCC)  Right frontal parietal large IPH, extensive IVH and subfalcine herniation with significant mass effect and 1.1 cm midline shift   Presented in coma state.  Patient overnight had continuous vomiting and AMS when EMS was called  History of thrombocytopenia, baseline platelets 20-30 K.  Patient given DDAVP and TXA emergently. Also 2 units of platelets    Intracerebral Hemorrhage (ICH) Score:    [place] Coma Sore 5-12 equals +1   Age greater than or equal to 80 No   ICH Volume greater than or equal to 30 ml Yes (1 Point)   Intraventricular Hemorrhage Yes (1 Point)   Infratentorial Origin of Hemorrhage Yes (1 Point)   Total: 4     Imaging reviewed with :    CT head wo 9/10/24:Extensive intracranial hemorrhage with significant mass effect, right to left midline shift, and subfalcine herniation as detailed above.    CTA head and neck w/wo 9/10/24:CT Brain: No significant change in extensive intracranial hemorrhage.CT Angiography:  Unremarkable CTA neck and brain without large vessel arterial occlusion, significant flow-limiting stenosis, or focal saccular aneurysm. No active contrast extravasation/active high flow arterial hemorrhaging within the brain or right   cerebral hemispheric parenchymal hematoma.    CT head wo 9/10/24:read pending today    Plan:  Continue frequent neurological checks.   Case and imaging dicussed with , due to large IPH/IVH with evidence of herniation in the setting of thrombocytopenia.Injury with no meaningful recovery,  platelets 67 not safe to consider surgery.  Also given high ICH score of 4 with risk for high mortality rate.  No neurosurgical intervention recommended.  STAT CT head with any neurological decline including drop GCS of 2pts within 1 hr.  Recommend SBP <160mmHg.  Seizure prophylaxis per trauma team  Hold all antiplatelet and anticoagulation medications.   Medical management and pain control per primary team  Platelets this morning 67, recommend greater than 100.  Transfuse per primary team  Sodium 142, was given 250 mL 3% bolus followed by 3% at 50 mL/hour  DVT PPX: SCDs   Recommend goals of care discussion with family    Neurosurgery will sign off, call with any further question or concerns.  Type 2 diabetes mellitus without complication, without long-term current use of insulin (HCC)  Lab Results   Component Value Date    HGBA1C 5.6 09/10/2024       Recent Labs     09/09/24  2359 09/10/24  0237 09/10/24  0631   POCGLU 203* 203* 171*       Blood Sugar Average: Last 72 hrs:  (P) 187  Management per primary team, currently on SSI  Cirrhosis of liver without ascites (HCC)  History of cirrhosis with severe thrombocytopenia   Follows with Dr. Pyle as an outpatient with GI, unable to undergo liver biopsy secondary to severe thrombocytopenia.  MRI showed esophageal varices  Unfortunately unable to undergo EGD secondary to severe thrombocytopenia.  Thrombocytopenia (HCC)  History of thrombocytopenia, baseline platelets 20-30 K   Currently 67      History of Present Illness   HPI: Sid Noel is a 44 y.o.  male with past medical history significant for liver cirrhosis, diabetes, enlarged spleen, and thrombocytopenia.  Patient originally presented to [facility] early this morning around 12:30 AM, he was brought in by EMS.  He presented with altered mental status.  His wife found him unresponsive in bed actively vomiting choking on vomit.  Narcan was given twice by EMS.  Upon arrival to the ED patient was  actively vomiting covered in vomit with unequal pupils right larger than left with some movement seen on right side with posturing on left, he was intubated for airway protection.  CT scan demonstrated extensive ICH with significant mass effect and midline shift with subfalcine herniation.  He was transferred to [facility] for higher level of care and neurosurgical evaluation.    Patient remains intubated off sedation. Unequal nonreactive pupils R>L.  Positive cough and corneals.  Decerebrate posturing in upper extremities and triple flexion in lower extremities to painful stimuli.      Review of Systems   Unable to perform ROS: Intubated         Temp:  [97.5 °F (36.4 °C)-98.2 °F (36.8 °C)] 97.5 °F (36.4 °C)  HR:  [] 80  Resp:  [14-35] 15  BP: (116-210)/() 119/59  Arterial Line BP: (142-150)/(48-52) 150/48  FiO2 (%):  [40] 40  O2 Device: Ventilator          I/O         09/08 0701 09/09 0700 09/09 0701  09/10 0700 09/10 0701 09/11 0700    I.V.  39.9     Blood  420     NG/GT  20     IV Piggyback  50     Total Intake  529.9     Urine  450     Emesis/NG output  0     Total Output  450     Net  +79.9                  Lines/Drains/Airways       Active Status       Name Placement date Placement time Site Days    CVC Central Lines 09/10/24 Triple 16cm 09/10/24  0508  --  less than 1    ETT  Oral 8 mm 09/10/24  0012  -- less than 1    NG/OG/Enteral Tube 16 Fr Right mouth 09/10/24  0115  Right mouth  less than 1    Urethral Catheter Temperature probe 16 Fr. 09/10/24  0040  Temperature probe  less than 1                  Physical Exam  Vitals reviewed.   Constitutional:       General: He is not in acute distress.     Appearance: He is ill-appearing.      Interventions: He is intubated.      Comments: Intubated on no sedation    HENT:      Head: Normocephalic and atraumatic.   Eyes:      Conjunctiva/sclera: Conjunctivae normal.      Comments: Unequal pupils R>L. Dysconjugate gaze. + corneals and  cough   Cardiovascular:      Rate and Rhythm: Normal rate.   Pulmonary:      Effort: Pulmonary effort is normal. No respiratory distress. He is intubated.      Comments: Intubated   Abdominal:      General: There is no distension.      Palpations: Abdomen is soft.   Musculoskeletal:      Cervical back: Neck supple.   Skin:     General: Skin is warm and dry.   Neurological:      Deep Tendon Reflexes:      Reflex Scores:       Bicep reflexes are 1+ on the right side and 1+ on the left side.       Patellar reflexes are 1+ on the right side and 1+ on the left side.     Comments: GCS 5T, E1, V1T, M 3      Neurologic Exam     Mental Status   GCS 5T, E1, V1T, M3     Cranial Nerves     CN III, IV, VI   Right pupil: Size: 4 mm. Reactivity: non-reactive.   Left pupil: Size: 3 mm. Reactivity: non-reactive.   Disconjugate gaze  Bilateral corneal reflexes intact  Positive cough     Motor Exam Bilateral upper extremities decerebrate posturing to painful stimuli  Bilateral lower extremities triple flexion to painful stimuli     Sensory Exam   Intact to crude touch     Gait, Coordination, and Reflexes     Reflexes   Right biceps: 1+  Left biceps: 1+  Right patellar: 1+  Left patellar: 1+  Right Baird: absent  Left Baird: absent  Right ankle clonus: absent  Left ankle clonus: absent      Lab Results: I have reviewed the following results: Pertinent labs reviewed  Recent Labs     09/10/24  0031 09/10/24  0120 09/10/24  0120 09/10/24  0228 09/10/24  0330 09/10/24  0541   WBC  --  3.06*   < > 4.32  --   --    HGB  --  12.5   < > 12.4  --   --    HCT  --  36.1*   < > 35.6*  --   --    PLT  --  33*   < > 59*  --  67*   BANDSPCT  --  1  --   --   --   --    SODIUM 138  --   --   --  138 142   K 3.7  --   --   --  3.7 3.7   *  --   --   --  109* 111*   CO2 19*  --   --   --  20* 21   BUN 8  --   --   --  8 8   CREATININE 0.61  --   --   --  0.54* 0.47*   GLUC 202*  --   --   --  194* 178*   CAIONIZED  --   --   --  1.05*  --   --     MG  --   --   --   --  1.5*  --    PHOS  --   --   --   --  3.1  --    AST 46*  --   --   --  49*  --    ALT 22  --   --   --  22  --    ALB 3.1*  --   --   --  3.1*  --    TBILI 2.10*  --   --   --  2.35*  --    ALKPHOS 112*  --   --   --  103  --    PTT 37*  --   --   --   --   --    INR 1.43*  --   --  1.44*  --   --    LACTICACID  --   --    < > 3.9*  --  2.7*    < > = values in this interval not displayed.     Imaging Review: Reviewed pertinent imaging   Other Studies: No additional pertinent studies reviewed.    VTE Pharmacologic Prophylaxis: Sequential compression device (Venodyne)

## 2024-09-10 NOTE — PLAN OF CARE
Problem: PAIN - ADULT  Goal: Verbalizes/displays adequate comfort level or baseline comfort level  Description: Interventions:  - Encourage patient to monitor pain and request assistance  - Assess pain using appropriate pain scale  - Administer analgesics based on type and severity of pain and evaluate response  - Implement non-pharmacological measures as appropriate and evaluate response  - Consider cultural and social influences on pain and pain management  - Notify physician/advanced practitioner if interventions unsuccessful or patient reports new pain  Outcome: Progressing     Problem: INFECTION - ADULT  Goal: Absence or prevention of progression during hospitalization  Description: INTERVENTIONS:  - Assess and monitor for signs and symptoms of infection  - Monitor lab/diagnostic results  - Monitor all insertion sites, i.e. indwelling lines, tubes, and drains  - Monitor endotracheal if appropriate and nasal secretions for changes in amount and color  - [place] appropriate cooling/warming therapies per order  - Administer medications as ordered  - Instruct and encourage patient and family to use good hand hygiene technique  - Identify and instruct in appropriate isolation precautions for identified infection/condition  Outcome: Progressing  Goal: Absence of fever/infection during neutropenic period  Description: INTERVENTIONS:  - Monitor WBC    Outcome: Progressing     Problem: SAFETY ADULT  Goal: Patient will remain free of falls  Description: INTERVENTIONS:  - Educate patient/family on patient safety including physical limitations  - Instruct patient to call for assistance with activity   - Consult OT/PT to assist with strengthening/mobility   - Keep Call bell within reach  - Keep bed low and locked with side rails adjusted as appropriate  - Keep care items and personal belongings within reach  - Initiate and maintain comfort rounds  - Make Fall Risk Sign visible to staff  - Offer Toileting every  Hours, in  advance of need  - Initiate/Maintain alarm  - Obtain necessary fall risk management equipment:   - Apply yellow socks and bracelet for high fall risk patients  - Consider moving patient to room near nurses station  Outcome: Progressing  Goal: Maintain or return to baseline ADL function  Description: INTERVENTIONS:  -  Assess patient's ability to carry out ADLs; assess patient's baseline for ADL function and identify physical deficits which impact ability to perform ADLs (bathing, care of mouth/teeth, toileting, grooming, dressing, etc.)  - Assess/evaluate cause of self-care deficits   - Assess range of motion  - Assess patient's mobility; develop plan if impaired  - Assess patient's need for assistive devices and provide as appropriate  - Encourage maximum independence but intervene and supervise when necessary  - Involve family in performance of ADLs  - Assess for home care needs following discharge   - Consider OT consult to assist with ADL evaluation and planning for discharge  - Provide patient education as appropriate  Outcome: Progressing  Goal: Maintains/Returns to pre admission functional level  Description: INTERVENTIONS:  - Perform AM-PAC 6 Click Basic Mobility/ Daily Activity assessment daily.  - Set and communicate daily mobility goal to care team and patient/family/caregiver.   - Collaborate with rehabilitation services on mobility goals if consulted  - Perform Range of Motion  times a day.  - Reposition patient every  hours.  - Dangle patient  times a day  - Stand patient  times a day  - Ambulate patient  times a day  - Out of bed to chair  times a day   - Out of bed for meal times a day  - Out of bed for toileting  - Record patient progress and toleration of activity level   Outcome: Progressing     Problem: DISCHARGE PLANNING  Goal: Discharge to home or other facility with appropriate resources  Description: INTERVENTIONS:  - Identify barriers to discharge w/patient and caregiver  - Arrange for  needed discharge resources and transportation as appropriate  - Identify discharge learning needs (meds, wound care, etc.)  - Arrange for interpretive services to assist at discharge as needed  - Refer to Case Management Department for coordinating discharge planning if the patient needs post-hospital services based on physician/advanced practitioner order or complex needs related to functional status, cognitive ability, or social support system  Outcome: Progressing     Problem: Knowledge Deficit  Goal: Patient/family/caregiver demonstrates understanding of disease process, treatment plan, medications, and discharge instructions  Description: Complete learning assessment and assess knowledge base.  Interventions:  - Provide teaching at level of understanding  - Provide teaching via preferred learning methods  Outcome: Progressing     Problem: Neurological Deficit  Goal: Neurological status is stable or improving  Description: Interventions:  - Monitor and assess patient's level of consciousness, motor function, sensory function, and level of assistance needed for ADLs.   - Monitor and report changes from baseline. Collaborate with interdisciplinary team to initiate plan and implement interventions as ordered.   - Provide and maintain a safe environment.  - Consider seizure precautions.  - Consider fall precautions.  - Consider aspiration precautions.  - Consider bleeding precautions.  Outcome: Progressing     Problem: Activity Intolerance/Impaired Mobility  Goal: Mobility/activity is maintained at optimum level for patient  Description: Interventions:  - Assess and monitor patient  barriers to mobility and need for assistive/adaptive devices.  - Assess patient's emotional response to limitations.  - Collaborate with interdisciplinary team and initiate plans and interventions as ordered.  - Encourage independent activity per ability.  - Maintain proper body alignment.  - Perform active/passive rom as  tolerated/ordered.  - Plan activities to conserve energy.  - Turn patient as appropriate  Outcome: Progressing     Problem: Communication Impairment  Goal: Ability to express needs and understand communication  Description: Assess patient's communication skills and ability to understand information.  Patient will demonstrate use of effective communication techniques, alternative methods of communication and understanding even if not able to speak.     - Encourage communication and provide alternate methods of communication as needed.  - Collaborate with case management/ for discharge needs.  - Include patient/family/caregiver in decisions related to communication.  Outcome: Progressing     Problem: Potential for Aspiration  Goal: Non-ventilated patient's risk of aspiration is minimized  Description: Assess and monitor vital signs, respiratory status, and labs (WBC).  Monitor for signs of aspiration (tachypnea, cough, rales, wheezing, cyanosis, fever).    - Assess and monitor patient's ability to swallow.  - Place patient up in chair to eat if possible.  - HOB up at 90 degrees to eat if unable to get patient up into chair.  - Supervise patient during oral intake.   - Instruct patient/ family to take small bites.  - Instruct patient/ family to take small single sips when taking liquids.  - Follow patient-specific strategies generated by speech pathologist.  Outcome: Progressing  Goal: Ventilated patient's risk of aspiration is minimized  Description: Assess and monitor vital signs, respiratory status, airway cuff pressure, and labs (WBC).  Monitor for signs of aspiration (tachypnea, cough, rales, wheezing, cyanosis, fever).    - Elevate head of bed 30 degrees if patient has tube feeding.  - Monitor tube feeding.  Outcome: Progressing     Problem: Nutrition  Goal: Nutrition/Hydration status is improving  Description: Monitor and assess patient's nutrition/hydration status for malnutrition (ex- brittle  hair, bruises, dry skin, pale skin and conjunctiva, muscle wasting, smooth red tongue, and disorientation). Collaborate with interdisciplinary team and initiate plan and interventions as ordered.  Monitor patient's weight and dietary intake as ordered or per policy. Utilize nutrition screening tool and intervene per policy. Determine patient's food preferences and provide high-protein, high-caloric foods as appropriate.     - Assist patient with eating.  - Allow adequate time for meals.  - Encourage patient to take dietary supplement as ordered.  - Collaborate with clinical nutritionist.  - Include patient/family/caregiver in decisions related to nutrition.  Outcome: Progressing     Problem: Prexisting or High Potential for Compromised Skin Integrity  Goal: Skin integrity is maintained or improved  Description: INTERVENTIONS:  - Identify patients at risk for skin breakdown  - Assess and monitor skin integrity  - Assess and monitor nutrition and hydration status  - Monitor labs   - Assess for incontinence   - Turn and reposition patient  - Assist with mobility/ambulation  - Relieve pressure over bony prominences  - Avoid friction and shearing  - Provide appropriate hygiene as needed including keeping skin clean and dry  - Evaluate need for skin moisturizer/barrier cream  - Collaborate with interdisciplinary team   - Patient/family teaching  - Consider wound care consult   Outcome: Progressing

## 2024-09-10 NOTE — ED PROVIDER NOTES
History  Chief Complaint   Patient presents with    Altered Mental Status     Wife found pt in bed unresponsive, gargling on vomit. EMS administered 2 doses of narcan, one intranasal and one IV. EMS reported pin point pupils. Witnessed vomiting by ems.      The patient is a 44 year old male who presents to ED for altered mental status. History is limited due to clinical condition and all history is provided by EMS. Per EMS report, the was found by his wife unresponsive in his bed actively vomiting and choking on vomit. EMS gave one dose nasal narcan and one dose IV narcan in route without change of condition. Upon arrival, pt was actively vomiting and covered in vomit with unequal pupils, right greater than left, some RUE and RLE movement to pain, and showing decorticate posturing on the left. Pt was intubated for airway protection.         Prior to Admission Medications   Prescriptions Last Dose Informant Patient Reported? Taking?   Tranexamic Acid 650 MG TABS  Self No No   Sig: Take 2 tablets (1,300 mg total) by mouth 2 (two) times a day For 5 days   atorvastatin (LIPITOR) 10 mg tablet  Self Yes No   Sig: Take 10 mg by mouth daily   metFORMIN (GLUCOPHAGE-XR) 500 mg 24 hr tablet  Self Yes No   Si mg      Facility-Administered Medications: None       Past Medical History:   Diagnosis Date    Cirrhosis (HCC)     Diabetes mellitus (HCC)     Low platelet count (HCC)     Spleen enlarged        History reviewed. No pertinent surgical history.    History reviewed. No pertinent family history.  I have reviewed and agree with the history as documented.    E-Cigarette/Vaping     E-Cigarette/Vaping Substances    Nicotine No      Social History     Tobacco Use    Smoking status: Never     Passive exposure: Never    Smokeless tobacco: Never   Substance Use Topics    Alcohol use: Never    Drug use: Not Currently        Review of Systems   Unable to perform ROS: Patient unresponsive   Constitutional:  Positive for activity  change.   Gastrointestinal:  Positive for vomiting.       Physical Exam  ED Triage Vitals   Temperature Pulse Respirations Blood Pressure SpO2   09/10/24 0048 09/09/24 2356 09/09/24 2356 09/09/24 2356 09/09/24 2356   97.7 °F (36.5 °C) 94 14 143/76 94 %      Temp Source Heart Rate Source Patient Position - Orthostatic VS BP Location FiO2 (%)   09/10/24 0048 09/09/24 2356 09/09/24 2356 09/09/24 2356 --   Bladder Monitor Sitting Right arm       Pain Score       --                    Orthostatic Vital Signs  Vitals:    09/10/24 0125 09/10/24 0128 09/10/24 0133 09/10/24 0136   BP:  118/71 121/70 120/66   Pulse: 93 93 93 90   Patient Position - Orthostatic VS:           Physical Exam  Vitals and nursing note reviewed. Exam conducted with a chaperone present.   Constitutional:       General: He is in acute distress.      Appearance: He is ill-appearing and toxic-appearing.   HENT:      Head: Normocephalic and atraumatic.      Nose: Nose normal.      Mouth/Throat:      Mouth: Mucous membranes are moist.   Eyes:      Pupils: Pupils are unequal.   Cardiovascular:      Rate and Rhythm: Normal rate. Rhythm irregular.      Pulses: Normal pulses.   Pulmonary:      Breath sounds: No stridor. No wheezing, rhonchi or rales.   Abdominal:      Palpations: Abdomen is soft.      Comments: Actively vomiting upon presentation   Genitourinary:     Penis: Normal.    Musculoskeletal:         General: No deformity.      Right lower leg: No edema.      Left lower leg: No edema.   Skin:     General: Skin is warm and dry.   Neurological:      Mental Status: He is unresponsive.      GCS: GCS eye subscore is 1. GCS verbal subscore is 1. GCS motor subscore is 4.      Comments: Decorticate posturing on the left side         ED Medications  Medications   propofol (DIPRIVAN) 1000 mg in 100 mL infusion (premix) (15 mcg/kg/min × 63.9 kg Intravenous Rate/Dose Change 9/10/24 0128)   fentaNYL 1000 mcg in sodium chloride 0.9% 100mL infusion (30 mcg/hr  Intravenous Rate/Dose Change 9/10/24 0127)   Succinylcholine Chloride 100 mg/5 mL syringe 80 mg (80 mg Intravenous Given by Other 9/10/24 0008)   etomidate (AMIDATE) 2 mg/mL injection 10 mg (10 mg Intravenous Given 9/10/24 0007)   fentaNYL injection 100 mcg (100 mcg Intravenous Given 9/10/24 0020)   naloxone (FOR EMS ONLY) (NARCAN) 2 MG/2ML injection 4 mg (0 mg Does not apply Given to EMS 9/10/24 0031)   tranexamic acid (CYKLOKAPRON) 1000-0.7 MG/100ML-% injection 1,000 mg (0 mg Intravenous Stopped 9/10/24 0127)   iohexol (OMNIPAQUE) 350 MG/ML injection (MULTI-DOSE) 85 mL (85 mL Intravenous Given 9/10/24 0106)       Diagnostic Studies  Results Reviewed       Procedure Component Value Units Date/Time    Urine Microscopic [[id number]]  (Abnormal) Collected: 09/10/24 0049    Lab Status: Final result Specimen: Urine, Clean Catch Updated: 09/10/24 0136     RBC, UA 20-30 /hpf      WBC, UA 2-4 /hpf      Epithelial Cells Occasional /hpf      Bacteria, UA Occasional /hpf     APTT [[id number]]  (Abnormal) Collected: 09/10/24 0031    Lab Status: Final result Specimen: Blood from Arm, Left Updated: 09/10/24 0129     PTT 37 seconds     Protime-INR [[id number]]  (Abnormal) Collected: 09/10/24 0031    Lab Status: Final result Specimen: Blood from Arm, Left Updated: 09/10/24 0129     Protime 18.2 seconds      INR 1.43    Narrative:      INR Therapeutic Range    Indication                                             INR Range      Atrial Fibrillation                                               2.0-3.0  Hypercoagulable State                                    2.0.2.3  Left Ventricular Asist Device                            2.0-3.0  Mechanical Heart Valve                                  -    Aortic(with afib, MI, embolism, HF, LA enlargement,    and/or coagulopathy)                                     2.0-3.0 (2.5-3.5)     Mitral                                                             2.5-3.5  Prosthetic/Bioprosthetic Heart Valve                2.0-3.0  Venous thromboembolism (VTE: VT, PE        2.0-3.0    CBC and differential [[id number]] Collected: 09/10/24 0120    Lab Status: In process Specimen: Blood from Arm, Left Updated: 09/10/24 0121    Rapid drug screen, urine [[id number]]  (Normal) Collected: 09/10/24 0048    Lab Status: Final result Specimen: Urine, Catheter Updated: 09/10/24 0115     Amph/Meth UR Negative     Barbiturate Ur Negative     Benzodiazepine Urine Negative     Cocaine Urine Negative     Methadone Urine Negative     Opiate Urine Negative     PCP Ur Negative     THC Urine Negative     Oxycodone Urine Negative     Fentanyl Urine Negative     HYDROCODONE URINE Negative    Narrative:      FOR MEDICAL PURPOSES ONLY.   IF CONFIRMATION NEEDED PLEASE CONTACT THE LAB WITHIN 5 DAYS.    Drug Screen Cutoff Levels:  AMPHETAMINE/METHAMPHETAMINES  1000 ng/mL  BARBITURATES     200 ng/mL  BENZODIAZEPINES     200 ng/mL  COCAINE      300 ng/mL  METHADONE      300 ng/mL  OPIATES      300 ng/mL  PHENCYCLIDINE     25 ng/mL  THC       50 ng/mL  OXYCODONE      100 ng/mL  FENTANYL      5 ng/mL  HYDROCODONE     300 ng/mL    UA w Reflex to Micro and Culture- ONLY if within 24 hours of admission [[id number]]  (Abnormal) Collected: 09/10/24 0049    Lab Status: Final result Specimen: Urine, Clean Catch Updated: 09/10/24 0108     Color, UA Light Yellow     Clarity, UA Clear     Specific Gravity, UA 1.014     pH, UA 5.5     Leukocytes, UA Negative     Nitrite, UA Negative     Protein, UA 30 (1+) mg/dl      Glucose, UA Negative mg/dl      Ketones, UA Negative mg/dl      Urobilinogen, UA <2.0 mg/dl      Bilirubin, UA Negative     Occult Blood, UA Large    Comprehensive metabolic panel [[id number]]  (Abnormal) Collected: 09/10/24 0031    Lab Status: Final result Specimen: Blood from Arm, Left Updated: 09/10/24 0059     Sodium 138 mmol/L      Potassium 3.7 mmol/L      Chloride 109 mmol/L      CO2 19 mmol/L      ANION GAP 10 mmol/L      BUN 8 mg/dL       Creatinine 0.61 mg/dL      Glucose 202 mg/dL      Calcium 8.2 mg/dL      Corrected Calcium 8.9 mg/dL      AST 46 U/L      ALT 22 U/L      Alkaline Phosphatase 112 U/L      Total Protein 7.3 g/dL      Albumin 3.1 g/dL      Total Bilirubin 2.10 mg/dL      eGFR 122 ml/min/1.73sq m     Narrative:      National Kid[employer] Foundation guidelines for Chronic Kidney Disease (CKD):     Stage 1 with normal or high GFR (GFR > 90 mL/min/1.73 square meters)    Stage 2 Mild CKD (GFR = 60-89 mL/min/1.73 square meters)    Stage 3A Moderate CKD (GFR = 45-59 mL/min/1.73 square meters)    Stage 3B Moderate CKD (GFR = 30-44 mL/min/1.73 square meters)    Stage 4 Severe CKD (GFR = 15-29 mL/min/1.73 square meters)    Stage 5 End Stage CKD (GFR <15 mL/min/1.73 square meters)  Note: GFR calculation is accurate only with a steady state creatinine    Ethanol [[id number]]  (Normal) Collected: 09/10/24 0031    Lab Status: Final result Specimen: Blood from Arm, Left Updated: 09/10/24 0059     Ethanol Lvl <10 mg/dL     TEG 6 Global Hemostasis with Lysis [[id number]] Collected: 09/10/24 0031    Lab Status: In process Specimen: Blood from Arm, Left Updated: 09/10/24 0039    Fingerstick Glucose (POCT) [[id number]]  (Abnormal) Collected: 09/09/24 2359    Lab Status: Final result Specimen: Blood Updated: 09/10/24 0014     POC Glucose 203 mg/dl                    CT head without contrast   Final Result by Yasir De La Torre MD (09/10 0113)      Extensive intracranial hemorrhage with significant mass effect, right to left midline shift, and subfalcine herniation as detailed above.            Findings discussed with Dr. Longo      Workstation performed: [id number]         XR chest 1 view portable    (Results Pending)   CTA head and neck with and without contrast    (Results Pending)         Procedures  ECG 12 Lead Documentation Only    Date/Time: 9/9/2024 11:57 AM    Performed by: Basliia Longo MD  Authorized by: Basilia Longo MD    Indications /  Diagnosis:  AMS  ECG reviewed by me, the ED Provider: yes    Patient location:  ED  Previous ECG:     Previous ECG:  Unavailable  Interpretation:     Interpretation: normal    Rate:     ECG rate:  93    ECG rate assessment: normal    Rhythm:     Rhythm: sinus rhythm    Ectopy:     Ectopy: none    QRS:     QRS axis:  Normal    QRS intervals:  Normal  Conduction:     Conduction: normal    ST segments:     ST segments:  Normal  T waves:     T waves: normal          ED Course  ED Course as of 09/10/24 0204   Tue Sep 10, 2024   0150 CT head without contrast  IMPRESSION:     Extensive intracranial hemorrhage with significant mass effect, right to left midline shift, and subfalcine herniation as detailed above.                                         Medical Decision Making  DDX: intracranial bleed, seizure, drug toxicity    Pt minimally responsive to painful stimuli with active vomiting. Pt intubated for airway protection and sedation provided with propofol and fentanyl.   Pt transported to CT scan preliminary imaging appears to have significant intracranial bleed.  Chart review notes pt has HX of liver disease with thrombocytopenia noted February 2024. Un-crossmatched platelets 1 unit administered.   Transfer order placed for neuro critical care as unavailable at this campus. Discussed case with Neuro critical who agreed to transfer to [facility].  I called wife Keith and updated her that the pt is critical and that the condition maybe un survivable but further evaluation by neuro critical care and neurosurgery is required.   Pt transferred to [facility] neuro ICU       Amount and/or Complexity of Data Reviewed  Independent Historian: spouse and EMS  Labs: ordered.  Radiology: ordered.    Risk  Prescription drug management.  Decision regarding hospitalization.          Disposition  Final diagnoses:   ICH (intracerebral hemorrhage) (HCC)     Time reflects when diagnosis was documented in both MDM as applicable and the Disposition  within this note       Time User Action Codes Description Comment    9/10/2024  1:03 AM Basilia Longo Add [I61.9] ICH (intracerebral hemorrhage) (HCC)           ED Disposition       ED Disposition   Transfer to Another Facility-In Network    Condition   --    Date/Time   Tue Sep 10, 2024 12:38 AM    Comment   Sid Noel should be transferred out to [facility]".               MD Documentation      Flowsheet Row Most Recent Value   Patient Condition The patient has been stabilized such that within reasonable medical probability, no material deterioration of the patient condition or the condition of the unborn child(angel) is likely to result from the transfer   Reason for Transfer Level of Care needed not available at this facility   Benefits of Transfer Specialized equipment and/or services available at the receiving facility (Include comment)________________________  [Neurosurgery]   Risks of Transfer Potential deterioration of medical condition, Possible worsening of condition or death during transfer, Loss of IV, Increased discomfort during transfer   Accepting Physician William   Accepting Facility Name, [facility]   Sending MD Angeline Johnson   Provider Certification General risk, such as traffic hazards, adverse weather conditions, rough terrain or turbulence, possible failure of equipment (including vehicle or aircraft), or consequences of actions of persons outside the control of the transport personnel, Unanticipated needs of medical equipment and personnel during transport, Risk of worsening condition, The possibility of a transport vehicle being unavailable          RN Documentation      Flowsheet Row Most Recent Value   Accepting Facility Name, [facility]          Follow-up Information    None         Discharge Medication List as of 9/10/2024  1:57 AM        CONTINUE these medications which have NOT CHANGED    Details   atorvastatin (LIPITOR) 10 mg tablet Take 10 mg by mouth daily, Historical Med       metFORMIN (GLUCOPHAGE-XR) 500 mg 24 hr tablet 500 mg, Historical Med      Tranexamic Acid 650 MG TABS Take 2 tablets (1,300 mg total) by mouth 2 (two) times a day For 5 days, Starting Mon 2/26/2024, Normal           No discharge procedures on file.    PDMP Review       None             ED Provider  Attending physically available and evaluated Sid Noel. I managed the patient along with the ED Attending.    Electronically Signed by           Basilia Longo MD  09/10/24 2825

## 2024-09-10 NOTE — ACP (ADVANCE CARE PLANNING)
Spoke with patient's wife at bedside. Discussed patient's prognosis and options we have for moving forward including continuing life-preserving measures vs comfort care. Explained that according to the imaging we have right now, patient has poor prognosis and recovery capability due to the extent of bleed. Discussed why neurosurgery is deferring surgical intervention at this time. Explained that if we pursue life-preserving measures, patient would likely need a trach and PEG tube and long-term care facility with unknown capacity to recover mental function/QOL. Wife called patient's brother who is flying in from [facility] and we explained the situation to the brother as well. Brother would like to defer delving further into this conversation until his arrival tomorrow afternoon. He estimates arrival at around 1:30 PM tomorrow. We will plan for further [facility] discussion at that time with wife and brother.    Deepti Arriola M.D.  PGY-1 Internal Medicine   [facility]

## 2024-09-10 NOTE — PHYSICAL THERAPY NOTE
Physical Therapy Cancellation Note    PT orders received chart review completed. Pt is currently intubated/sedated and not appropriate to participate in skilled PT at this time. PT will follow and eval as medically appropriate.     09/10/24 1400   Note Type   Note type Cancelled Session   Cancel Reasons Intubated/sedated       Sri Talavera, PT

## 2024-09-10 NOTE — ED ATTENDING ATTESTATION
9/9/2024  I, Angeline Johnson MD, saw and evaluated the patient. I have discussed the patient with the resident/non-physician practitioner and agree with the resident's/non-physician practitioner's findings, Plan of Care, and MDM as documented in the resident's/non-physician practitioner's note, except where noted. All available labs and Radiology studies were reviewed.  I was present for key portions of any procedure(s) performed by the resident/non-physician practitioner and I was immediately available to provide assistance.       At this point I agree with the current assessment done in the Emergency Department.  I have conducted an independent evaluation of this patient a history and physical is as follows:    45 yo M w/ hx of cirrhosis presenting via EMS for AMS. Found down by wife. Per EMS, pt did not receive to 1 mg IN and 1 mg IV narcan. On arrival, pt had stable VS but GCS7 (E1V1M5) localizing in RUE and b/l lowers. Exam with vomitus on clothes, found to have anisocoria R>L, with posturing in LUE. Pt intubated for airway protection. CT brain notable for IPH with intraventricular extension and herniation. Pt accepted as txfer to [facility] for NSGY eval, given TXA, platelets, on fent/prop gtts.    ED Course         Critical Care Time  CriticalCare Time    Date/Time: 9/10/2024 1:42 AM    Performed by: Angeline Johnson MD  Authorized by: Angeline Johnson MD    Critical care provider statement:     Critical care time (minutes):  55    Critical care time was exclusive of:  Separately billable procedures and treating other patients and teaching time    Critical care was necessary to treat or prevent imminent or life-threatening deterioration of the following conditions:  Respiratory failure and CNS failure or compromise    Critical care was time spent personally by me on the following activities:  Discussions with consultants, re-evaluation of patient's condition, ordering and review of radiographic studies, ordering and review of  laboratory studies, ordering and performing treatments and interventions, evaluation of patient's response to treatment, examination of patient and review of old charts

## 2024-09-11 NOTE — PLAN OF CARE
Problem: PAIN - ADULT  Goal: Verbalizes/displays adequate comfort level or baseline comfort level  Description: Interventions:  - Encourage patient to monitor pain and request assistance  - Assess pain using appropriate pain scale  - Administer analgesics based on type and severity of pain and evaluate response  - Implement non-pharmacological measures as appropriate and evaluate response  - Consider cultural and social influences on pain and pain management  - Notify physician/advanced practitioner if interventions unsuccessful or patient reports new pain  Outcome: Progressing     Problem: INFECTION - ADULT  Goal: Absence or prevention of progression during hospitalization  Description: INTERVENTIONS:  - Assess and monitor for signs and symptoms of infection  - Monitor lab/diagnostic results  - Monitor all insertion sites, i.e. indwelling lines, tubes, and drains  - Monitor endotracheal if appropriate and nasal secretions for changes in amount and color  - [place] appropriate cooling/warming therapies per order  - Administer medications as ordered  - Instruct and encourage patient and family to use good hand hygiene technique  - Identify and instruct in appropriate isolation precautions for identified infection/condition  Outcome: Progressing  Goal: Absence of fever/infection during neutropenic period  Description: INTERVENTIONS:  - Monitor WBC    Outcome: Progressing     Problem: SAFETY ADULT  Goal: Patient will remain free of falls  Description: INTERVENTIONS:  - Educate patient/family on patient safety including physical limitations  - Instruct patient to call for assistance with activity   - Consult OT/PT to assist with strengthening/mobility   - Keep Call bell within reach  - Keep bed low and locked with side rails adjusted as appropriate  - Keep care items and personal belongings within reach  - Initiate and maintain comfort rounds  - Make Fall Risk Sign visible to staff  - Offer Toileting every 2 Hours,  in advance of need  - Initiate/Maintain bed alarm  - Obtain necessary fall risk management equipment  - Apply yellow socks and bracelet for high fall risk patients  - Consider moving patient to room near nurses station  Outcome: Progressing  Goal: Maintain or return to baseline ADL function  Description: INTERVENTIONS:  -  Assess patient's ability to carry out ADLs; assess patient's baseline for ADL function and identify physical deficits which impact ability to perform ADLs (bathing, care of mouth/teeth, toileting, grooming, dressing, etc.)  - Assess/evaluate cause of self-care deficits   - Assess range of motion  - Assess patient's mobility; develop plan if impaired  - Assess patient's need for assistive devices and provide as appropriate  - Encourage maximum independence but intervene and supervise when necessary  - Involve family in performance of ADLs  - Assess for home care needs following discharge   - Consider OT consult to assist with ADL evaluation and planning for discharge  - Provide patient education as appropriate  Outcome: Progressing  Goal: Maintains/Returns to pre admission functional level  Description: INTERVENTIONS:  - Perform AM-PAC 6 Click Basic Mobility/ Daily Activity assessment daily.  - Set and communicate daily mobility goal to care team and patient/family/caregiver.   - Collaborate with rehabilitation services on mobility goals if consulted  - Perform Range of Motion 3 times a day.  - Reposition patient every 2 hours.  - Dangle patient 3 times a day  - Stand patient 3 times a day  - Ambulate patient 3 times a day  - Out of bed to chair 3 times a day   - Out of bed for meals 3 times a day  - Out of bed for toileting  - Record patient progress and toleration of activity level   Outcome: Progressing     Problem: DISCHARGE PLANNING  Goal: Discharge to home or other facility with appropriate resources  Description: INTERVENTIONS:  - Identify barriers to discharge w/patient and caregiver  -  Arrange for needed discharge resources and transportation as appropriate  - Identify discharge learning needs (meds, wound care, etc.)  - Arrange for interpretive services to assist at discharge as needed  - Refer to Case Management Department for coordinating discharge planning if the patient needs post-hospital services based on physician/advanced practitioner order or complex needs related to functional status, cognitive ability, or social support system  Outcome: Progressing     Problem: Knowledge Deficit  Goal: Patient/family/caregiver demonstrates understanding of disease process, treatment plan, medications, and discharge instructions  Description: Complete learning assessment and assess knowledge base.  Interventions:  - Provide teaching at level of understanding  - Provide teaching via preferred learning methods  Outcome: Progressing     Problem: Neurological Deficit  Goal: Neurological status is stable or improving  Description: Interventions:  - Monitor and assess patient's level of consciousness, motor function, sensory function, and level of assistance needed for ADLs.   - Monitor and report changes from baseline. Collaborate with interdisciplinary team to initiate plan and implement interventions as ordered.   - Provide and maintain a safe environment.  - Consider seizure precautions.  - Consider fall precautions.  - Consider aspiration precautions.  - Consider bleeding precautions.  Outcome: Progressing     Problem: Activity Intolerance/Impaired Mobility  Goal: Mobility/activity is maintained at optimum level for patient  Description: Interventions:  - Assess and monitor patient  barriers to mobility and need for assistive/adaptive devices.  - Assess patient's emotional response to limitations.  - Collaborate with interdisciplinary team and initiate plans and interventions as ordered.  - Encourage independent activity per ability.  - Maintain proper body alignment.  - Perform active/passive rom as  tolerated/ordered.  - Plan activities to conserve energy.  - Turn patient as appropriate  Outcome: Progressing     Problem: Potential for Aspiration  Goal: Non-ventilated patient's risk of aspiration is minimized  Description: Assess and monitor vital signs, respiratory status, and labs (WBC).  Monitor for signs of aspiration (tachypnea, cough, rales, wheezing, cyanosis, fever).    - Assess and monitor patient's ability to swallow.  - Place patient up in chair to eat if possible.  - HOB up at 90 degrees to eat if unable to get patient up into chair.  - Supervise patient during oral intake.   - Instruct patient/ family to take small bites.  - Instruct patient/ family to take small single sips when taking liquids.  - Follow patient-specific strategies generated by speech pathologist.  Outcome: Progressing  Goal: Ventilated patient's risk of aspiration is minimized  Description: Assess and monitor vital signs, respiratory status, airway cuff pressure, and labs (WBC).  Monitor for signs of aspiration (tachypnea, cough, rales, wheezing, cyanosis, fever).    - Elevate head of bed 30 degrees if patient has tube feeding.  - Monitor tube feeding.  Outcome: Progressing

## 2024-09-11 NOTE — OCCUPATIONAL THERAPY NOTE
OT CANCEL NOTE    OT orders received. Chart reviewed. Pt is currently intubated/sedated and not appropriate to engage in skilled OT services at this time. Will hold initial OT evaluation. Will continue to follow pt on caseload and see pt when medically stable and as clinically appropriate.       09/11/24 0844   OT Last Visit   OT Visit Date 09/11/24   Note Type   Note type Evaluation;Cancelled Session   Cancel Reasons Intubated/sedated       Odalis Kaufman MS, OTR/L

## 2024-09-11 NOTE — RESPIRATORY THERAPY NOTE
RT Ventilator Management Note  Sid Noel 44 y.o. male MRN: [id number]  Unit/Bed#: ICU 13 Encounter: [id number]      Daily Screen         9/10/2024  0814 9/10/2024  1623          Patient safety screen outcome:: Failed Failed      Not Ready for Weaning due to:: Underline problem not resolved Underline problem not resolved                Physical Exam:   Assessment Type: Assess only  General Appearance: Sedated  Respiratory Pattern: Assisted  Chest Assessment: Chest expansion symmetrical  Bilateral Breath Sounds: Clear  Suction: ET Tube      Resp Comments: Pt resting comfortably on pressure support settings. No distress noted.

## 2024-09-11 NOTE — PHYSICAL THERAPY NOTE
Physical Therapy Cancellation Note       09/11/24 2399   Note Type   Note type Evaluation;Cancelled Session   Cancel Reasons Intubated/sedated

## 2024-09-11 NOTE — SEPSIS NOTE
Sepsis Note   Sid Noel 44 y.o. male MRN: [id number]  Unit/Bed#: ICU 13 Encounter: [id number]       Initial Sepsis Screening       Row Name 09/11/24 0541                Is the patient's history suggestive of a new or worsening infection? No  -LILIZ                  User Key  (r) = Recorded By, (t) = Taken By, (c) = Cosigned By      Initials Name Provider Type    CHICA Simpson PA-C Physician Assistant                        Body mass index is 23.44 kg/m².  Wt Readings from Last 1 Encounters:   09/09/24 63.9 kg (140 lb 14 oz)     IBW (Ideal Body Weight): 61.5 kg    Ideal body weight: 61.5 kg (135 lb 9.3 oz)  Adjusted ideal body weight: 62.5 kg (137 lb 11.2 oz)    Patient fired as a sepsis time zero- No evidence of acute or worsening infection    Naren Simpson PA-C

## 2024-09-11 NOTE — PROGRESS NOTES
Progress Note - Critical Care/ICU   Name: Sid Noel 44 y.o. 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   Per neurosurgery, no indication for surgical intervention at this time  Keppra load 2000  vEEG started 9/10 18:20- no definitive epileptic activity so far, diffuse theta/delta slowing consistent with moderate encephalopathy. Also has independent left and right hemisphere delta activities suggesting multifocal dysfunction.   Plan:   SBP<140  Plts >100  Hold 3% NSS gtt   Goal: Na 145-155 and Sosm 310-320  CTH with any change in GCS>2 pts  Neuro checks  On vEEG, monitor for epileptic activity  On keppra 750 q12h, can d/c if no epileptic activity after 24hrs vEEG     Diagnosis: Encephalopathy 2/2 above  Plan:   Neuro checks  Propofol off    CV:   Diagnosis: HLD  Plan:   Hold home statin    Pulm:  Diagnosis: acute resp failure 2/2 mental status  Plan:   On vent  PC 18/12/6/40 wean for sats> 92%  Suction as needed for respiratory hygiene  GI:   Diagnosis: Hx cirrhosis/varices/portal htn  No biopsy 2/2 low plts  Follows with Dr. Edenilson Ritchie seen on MRI, no EGD completed  Plan:   Monitor for s/s bleeding    :   Diagnosis: urinary leakage  Issues with marshall ON, leakage around marshall; concern that marshall catheter size is too small  Concern for exchanging due to thrombocytopenia  Plan:   Consider exchanging for condom catheter if strict IO not necessary  Monitor UO  Diagnosis: Metabolic acidosis   Plan:   Monitor BMP    F/E/N:   F: hypertonic saline gtt  E: Replete electrolytes for goal K >4, Phos >3, Mg >2   N: NPO    Heme/Onc:   Diagnosis: thrombocytopenia 2/2 cirrhosis  Prior BM biopsy no reports available  Plan:    Transfuse for plts > 100 given ICH    Endo:   Diagnosis: DM2  A1c 5.6  Plan:   Hold home metformin  SSI    ID:   No active issues    MSK/Skin:   No active issues  Disposition: Critical care    ICU Core Measures     Vented Patient  VAP Bundle  VAP bundle ordered     A: Assess, Prevent, and Manage Pain Has pain been assessed? NA  Need for changes to pain regimen? NA   B: Both Spontaneous Awakening Trials (SATs) and Spontaneous Breathing Trials (SBTs) Plan to perform spontaneous awakening trial today? Yes   Plan to perform spontaneous breathing trial today? Yes   Obvious barriers to extubation? Yes   C: Choice of Sedation RASS Goal: 0 Alert and Calm  Need for changes to sedation or analgesia regimen? No   D: Delirium CAM-ICU: Unable to perform secondary to Acute cognitive dysfunction   E: Early Mobility  Plan for early mobility? NA   F: Family Engagement Plan for family engagement today? Yes       Review of Invasive Devices:    [place]arshall Plan: Continue for accurate I/O monitoring for 48 hours  Central access plan: Hemodynamic monitoring  Carolin Plan: Keep arterial line for hemodynamic monitoring and frequent ABGs    Prophylaxis:  VTE Contraindicated secondary to: ICH   Stress Ulcer  covered byomeprazole (PRILOSEC) suspension 2 mg/mL [[id number]]         24 Hour Events   24hr events: Issues with marshall ON, leakage around marshall; concern that marshall catheter size is too small.    Subjective   Patient is unarousable to verbal or tactile stimuli.        Objective                          Vitals I/O      Most Recent Min/Max in 24hrs   Temp 99.3 °F (37.4 °C) Temp  Min: 97.5 °F (36.4 °C)  Max: 101.8 °F (38.8 °C)   Pulse 74 Pulse  Min: 62  Max: 108   Resp 19 Resp  Min: 16  Max: 25   /61 BP  Min: 94/42  Max: 140/66   O2 Sat 100 % SpO2  Min: 99 %  Max: 100 %      Intake/Output Summary (Last 24 hours) at 9/11/2024 0801  Last data filed at 9/11/2024 0600  Gross per 24 hour   Intake 2170.83 ml   Output 760 ml   Net 1410.83 ml        Diet NPO    Invasive Monitoring           Physical Exam   Physical Exam  Eyes:      Comments: OU pinpoint, nonreactive   Skin:     General: Skin is warm and dry.   HENT:      Head: Normocephalic and atraumatic.      Mouth/Throat:      Mouth: Mucous membranes are moist.   Cardiovascular:      Rate and Rhythm: Normal rate and regular rhythm.      Pulses: Normal pulses.      Heart sounds: Normal heart sounds.   Musculoskeletal:         General: No swelling.      Right lower leg: No edema.      Left lower leg: No edema.   Abdominal: General: Bowel sounds are normal. There is no distension.      Palpations: Abdomen is soft.   Constitutional:       Interventions: He is sedated, intubated and restrained.   Pulmonary:      Effort: Pulmonary effort is normal. He is intubated.      Breath sounds: Wheezing and rhonchi present.   Neurological:      Mental Status: He is unresponsive.      GCS: GCS eye subscore is 1. GCS verbal subscore is 1. GCS motor subscore is 3.        Corneal reflex absent, cough reflex and gag reflex intact.      Comments: Decorticate posturing to sternal rub  Decerebrate posturing to cough/gag          Diagnostic Studies        EKG:  Normal sinus rhythm with marked sinus arrhythmia  RSR' or QR pattern in V1 suggests right ventricular conduction delay  Borderline ECG  When compared with ECG of 10-SEP-2024 02:29, no significant change was found  Lab Results: I have reviewed the following results:      Medications:  Scheduled PRN   chlorhexidine, 15 mL, Q12H DAYANA  insulin lispro, 1-5 Units, Q6H DAYANA  lactulose, 20 g, TID  levETIRAcetam, 750 mg, Q12H DAYANA  omeprazole (PRILOSEC) suspension 2 mg/mL, 20 mg, Daily  potassium phosphate, 12 mmol, Once      acetaminophen, 650 mg, Q4H PRN  fentaNYL, 50 mcg, Q2H PRN  hydrALAZINE, 10 mg, Q6H PRN  labetalol, 10 mg, Q4H PRN       Continuous    niCARdipine, 1-15 mg/hr, Last Rate: Stopped (09/10/24 0841)  propofol, 5-50 mcg/kg/min, Last Rate: Stopped (09/10/24  0615)  sodium chloride, 50 mL/hr, Last Rate: 50 mL/hr (09/10/24 1802)         Labs:   CBC    Recent Labs     09/10/24  0120 09/10/24  0228 09/10/24  0541 09/11/24  0609   WBC 3.06* 4.32  --   --    HGB 12.5 12.4  --   --    HCT 36.1* 35.6*  --   --    PLT 33* 59* 67* 45*   BANDSPCT 1  --   --   --      BMP    Recent Labs     09/10/24  2358 09/11/24  0609   SODIUM 151* 154*   K 4.0 3.8   * 131*   CO2 19* 16*   AGAP 6 7   BUN 9 9   CREATININE 0.57* 0.54*   CALCIUM 7.7* 7.7*       Coags    Recent Labs     09/10/24  0031 09/10/24  0228   INR 1.43* 1.44*   PTT 37*  --         Additional Electrolytes  Recent Labs     09/10/24  0228 09/10/24  0330 09/11/24  0609   MG  --  1.5* 2.0   PHOS  --  3.1 2.3*   CAIONIZED 1.05*  --   --           Blood Gas    Recent Labs     09/10/24  0300   PHART 7.440   [id number] 29.0*   PO2ART 137.9*   RLI3FDH 19.3*   BEART -3.7   SOURCE Line, Arterial     Recent Labs     09/10/24  0300   SOURCE Line, Arterial    LFTs  Recent Labs     09/10/24  0031 09/10/24  0330   ALT 22 22   AST 46* 49*   ALKPHOS 112* 103   ALB 3.1* 3.1*   TBILI 2.10* 2.35*       Infectious  No recent results  Glucose  Recent Labs     09/10/24  1158 09/10/24  1806 09/10/24  2358 09/11/24  0609   GLUC 212* 179* 165* 154*

## 2024-09-11 NOTE — RESPIRATORY THERAPY NOTE
RT Ventilator Management Note  Sid Noel 44 y.o. male MRN: [id number]  Unit/Bed#: ICU 13 Encounter: [id number]      Daily Screen         9/10/2024  1623 9/11/2024  0751          Patient safety screen outcome:: Failed Failed (P)       Not Ready for Weaning due to:: Underline problem not resolved --      Spont breathing trial % for 30 min: -- --                Physical Exam:   Assessment Type: (P) Assess only  General Appearance: (P) Sedated  Respiratory Pattern: (P) Assisted  Chest Assessment: (P) Chest expansion symmetrical  Bilateral Breath Sounds: (P) Clear  Cough: (P) Strong  Suction: (P) ET Tube      Resp Comments: (P) pt continues on PSV tolerating it well at this time. no changes will ocntinue to monitor

## 2024-09-11 NOTE — UTILIZATION REVIEW
Initial Clinical Review    Pt initially presented to [facility]. Pt was transferred by EMS to [facility] for a higher level of care.    Admission: Date/Time/Statement:   Admission Orders (From admission, onward)       Ordered        09/10/24 0223  INPATIENT ADMISSION  Once                          Orders Placed This Encounter   Procedures    INPATIENT ADMISSION     Standing Status:   Standing     Number of Occurrences:   1     Order Specific Question:   Level of Care     Answer:   Critical Care [15]     Order Specific Question:   Estimated length of stay     Answer:   More than 2 Midnights     Order Specific Question:   Certification     Answer:   I certify that inpatient services are medically necessary for this patient for a duration of greater than two midnights. See H&P and MD Progress Notes for additional information about the patient's course of treatment.     Initial Presentation: 44 y.o. male who presented by EMS to [facility] as a direct admission. Admitted as Inpatient for evaluation and treatment of ICH. PMHx: cirrhosis, portal HTN, varices, T2DM, HLD. Presented w/ change in mental status. Wife notes pt was in bed, then vomited and became unresponsive. Intubated at prior facility. On exam, tachypnea, intubated, rhonchi, GCS 5. Labs Plt 33. Imaging showed large IPH w/ IVH and mass effect/shift/herniation. Received TXA and 1 unit platelets.  Plan: admit to ICU; SBP < 140, 3% NSS bolus for goal Na 145-155/Sosm 310-320, check CTH in 6h or if GCS changes > 2, neuro checks q1h, propofol gtt, hold home statin, check ABG, wean vent as able, maintain marshall, SSI w/ BG checks q6h, Trend labs, replete electrolytes as needed; telemetry, continuous pulse ox. Neurosurgery consulted.    Neurosurgery: Pt received DDAVP and TXA, as well as total of 2 units platelets. ICH score 4. Plan: q1h neuro checks, no neurosurgical intervention recommended s/t  evidence of herniation w/ thrombocytopenia unsafe to consider surgery. SBP < 160. Hold AC/AP. Recommend goals of care discussion with family.       Date: 09/11/24   Day 2: Pt w/ marshall issues overnight; concern for marshall size being too small. Pt unarousable to verbal or tactile stimuli. Exam: intubated, wheezing, rhonchi, GCS 5, decorticate posturing to sternal rub, decerebrate posturing to cough/gag. Plan: continue nicardipine gtt, epi gtt, IVF, SSI w/ BG checks q6h, PRN fentanyl x5. SBP < 140, q1h neuro checks, keppra, continuous vEEG, maintain vent, Trend labs, replete electrolytes as needed. NPO; supportive care.       ED Triage Vitals   Temperature Pulse Respirations Blood Pressure SpO2 Pain Score   09/10/24 0230 09/10/24 0230 09/10/24 0230 09/10/24 0230 09/10/24 0230 09/10/24 2227   98.2 °F (36.8 °C) 68 17 134/66 100 % Med Not Given for Pain - for MAR use only         Vital Signs (last 3 days)       Date/Time Temp Pulse Resp BP MAP (mmHg) Arterial Line BP MAP SpO2 FiO2 (%) O2 Device Patient Position - Orthostatic VS Jovanny Coma Scale Score Pain    09/11/24 0900 99.3 °F (37.4 °C) 100 20 168/85 132 170/66 102 mmHg 98 % -- -- -- 5 --    09/11/24 0800 99 °F (37.2 °C) 80 18 126/66 86 140/56 86 mmHg 100 % -- -- -- 5 --    09/11/24 0700 99.3 °F (37.4 °C) 72 17 110/56 70 118/44 66 mmHg 100 % -- -- -- -- --    09/11/24 0600 99.3 °F (37.4 °C) 74 19 132/61 79 128/48 72 mmHg 100 % -- -- -- 5 --    09/11/24 0500 99 °F (37.2 °C) 76 17 107/55 70 128/46 70 mmHg 100 % -- -- -- 5 --    09/11/24 0400 -- 108 25 140/66 80 174/74 106 mmHg 100 % -- -- -- 5 --    09/11/24 0348 -- -- -- -- -- -- -- 100 % -- -- -- -- --    09/11/24 0300 99 °F (37.2 °C) 62 19 104/53 70 124/50 70 mmHg 99 % -- -- -- 5 --    09/11/24 0200 98.6 °F (37 °C) 62 21 114/59 82 130/52 74 mmHg 100 % -- -- -- 5 --    09/11/24 0100 98.6 °F (37 °C) 70 20 103/57 70 132/52 74 mmHg 100 % -- -- -- 5 --    09/11/24 0000 98.2 °F (36.8 °C) 84 19 110/60 75 128/86 100 mmHg 100  % 40 Ventilator -- 5 --    09/10/24 2300 98.2 °F (36.8 °C) 70 17 98/51 68 122/50 70 mmHg 100 % -- -- -- 5 --    09/10/24 2253 98.2 °F (36.8 °C) 68 16 -- -- 122/50 70 mmHg 100 % -- -- -- -- --    09/10/24 2227 -- -- -- -- -- -- -- -- -- -- -- -- Med Not Given for Pain - for MAR use only    09/10/24 2220 -- -- -- -- -- -- -- 100 % -- -- -- -- --    09/10/24 2200 97.9 °F (36.6 °C) 70 19 104/58 72 132/52 76 mmHg 100 % -- -- -- 5 --    09/10/24 2100 97.5 °F (36.4 °C) 66 16 104/56 69 132/54 76 mmHg 100 % -- -- -- 5 --    09/10/24 2026 -- -- -- -- -- -- -- 100 % -- -- -- -- --    09/10/24 2000 97.5 °F (36.4 °C) 68 17 99/59 69 128/52 74 mmHg 100 % 40 Ventilator -- 5 --    09/10/24 1900 97.5 °F (36.4 °C) 68 16 105/58 72 126/52 74 mmHg 100 % -- -- -- 5 --    09/10/24 1800 98.2 °F (36.8 °C) 68 16 97/53 64 114/46 66 mmHg 100 % -- -- -- 5 --    09/10/24 1700 98.6 °F (37 °C) 74 17 101/52 65 120/48 68 mmHg 100 % -- -- -- 5 --    09/10/24 1623 -- -- -- -- -- -- -- 100 % -- -- -- -- --    09/10/24 1600 100 °F (37.8 °C) 84 18 102/53 66 118/48 66 mmHg 100 % -- -- -- 5 --    09/10/24 1500 101.5 °F (38.6 °C) 80 17 97/47 58 110/44 60 mmHg 99 % -- -- -- 5 --    09/10/24 1400 101.8 °F (38.8 °C) 82 17 104/48 63 116/42 60 mmHg 99 % -- -- -- 5 --    09/10/24 1300 101.8 °F (38.8 °C) 82 17 105/48 60 114/40 58 mmHg 99 % -- -- -- 5 --    09/10/24 1200 101.8 °F (38.8 °C) 76 16 94/42 58 106/36 52 mmHg 99 % -- -- -- 5 --    09/10/24 1118 -- -- -- -- -- -- -- 100 % -- -- -- -- --    09/10/24 1100 101.8 °F (38.8 °C) 84 18 101/49 69 136/46 68 mmHg 100 % -- -- -- 5 --    09/10/24 1035 101.5 °F (38.6 °C) 80 18 107/54 75 150/50 74 mmHg 99 % -- -- -- -- --    09/10/24 1000 100.8 °F (38.2 °C) 86 19 111/60 93 146/52 76 mmHg 100 % -- -- -- 5 --    09/10/24 0900 100 °F (37.8 °C) 68 17 119/62 77 148/54 80 mmHg 100 % -- -- -- 5 --    09/10/24 0814 -- -- -- -- -- -- -- 100 % -- -- -- -- --    09/10/24 0800 99.3 °F (37.4 °C) 64 16 116/59 74 146/54 78 mmHg 100 % --  -- -- 5 --    09/10/24 0700 99 °F (37.2 °C) 70 17 111/59 77 138/50 74 mmHg 100 % -- -- -- 5 --    09/10/24 0600 -- -- -- -- -- -- -- -- -- -- -- 4 --    09/10/24 0500 -- -- -- -- -- -- -- -- -- -- -- 4 --    09/10/24 0413 97.5 °F (36.4 °C) 80 15 119/59 -- 150/48 74 mmHg 100 % -- -- -- -- --    09/10/24 0402 97.7 °F (36.5 °C) 78 16 150/54 -- -- -- -- -- -- -- -- --    09/10/24 0400 -- -- -- -- -- -- -- -- -- -- -- 4 --    09/10/24 0300 97.9 °F (36.6 °C) 66 18 116/64 88 142/52 76 mmHg 100 % 40 Ventilator Lying 4 --    09/10/24 0230 98.2 °F (36.8 °C) 68 17 134/66 77 -- -- 100 % 40 Ventilator Lying 4 --              Pertinent Labs/Diagnostic Test Results:   Radiology:  XR chest portable ICU   Final Interpretation by Yasir Galvan MD (09/10 3286)      1.  Endotracheal tube tip at the phuc. Consider retracting by 2-2.5 cm.      2.  Right IJ catheter tip projecting over the lower SVC suggesting adequate central location.      3.  Hypoventilatory findings with resultant bibasilar atelectasis. No other acute cardiopulmonary findings.      The study was marked in EPIC for immediate notification.            Workstation performed: [id number]         CT head wo contrast   Final Interpretation by Guilherme Cooley MD (09/10 0942)      Large right frontoparietal intraparenchymal hemorrhage, increased with progression of associated intraventricular hemorrhage. Close interval follow-up is recommended.      Stable mass effect with midline shift .There is again partial effacement of the suprasellar and quadrigeminal plate cisterns.      Increased ventriculomegaly, most notable at the occipital and temporal horn regions.      The study was marked in EPIC for immediate notification.               Workstation performed: [id number]         XR chest portable   Final Interpretation by Jean Black MD (09/10 1043)      Endotracheal tube is just above the phuc. Consider retraction.      No focal airspace consolidation.             Workstation performed: [id number]           Cardiology:  ECG 12 lead    by Juan Jacques Results In (09/11 0906)      ECG 12 lead   Final Result by Lucrecia Thornton MD (09/10 1337)   Normal sinus rhythm with marked sinus arrhythmia   RSR' or QR pattern in V1 suggests right ventricular conduction delay   Borderline ECG   When compared with ECG of 10-SEP-2024 02:29, (unconfirmed)   No significant change was found   Confirmed by Lucrecia Thornton ([id number]) on 9/10/2024 1:37:50 PM      ECG 12 lead   Final Result by Lucrecia Thornton MD (09/10 1413)   Normal sinus rhythm with marked sinus arrhythmia   Incomplete right bundle branch block   Borderline ECG   When compared with ECG of 09-SEP-2024 23:57, (unconfirmed)   Incomplete right bundle branch block is now Present   Confirmed by Lucrecia Thornton ([id number]) on 9/10/2024 2:13:25 PM            Results from last 7 days   Lab Units 09/11/24  0609 09/10/24  0541 09/10/24  0228 09/10/24  0120   WBC Thousand/uL  --   --  4.32 3.06*   HEMOGLOBIN g/dL  --   --  12.4 12.5   HEMATOCRIT %  --   --  35.6* 36.1*   PLATELETS Thousands/uL 45* 67* 59* 33*   TOTAL NEUT ABS Thousands/µL  --   --  3.37  --    BANDS PCT %  --   --   --  1         Results from last 7 days   Lab Units 09/11/24  0609 09/10/24  2358 09/10/24  1806 09/10/24  1158 09/10/24  0541 09/10/24  0330 09/10/24  0228   SODIUM mmol/L 154* 151* 149* 143 142 138  --    POTASSIUM mmol/L 3.8 4.0 4.3 4.0 3.7 3.7  --    CHLORIDE mmol/L 131* 126* 122* 116* 111* 109*  --    CO2 mmol/L 16* 19* 17* 17* 21 20*  --    ANION GAP mmol/L 7 6 10 10 10 9  --    BUN mg/dL 9 9 10 9 8 8  --    CREATININE mg/dL 0.54* 0.57* 0.66 0.63 0.47* 0.54*  --    EGFR ml/min/1.73sq m 128 125 118 120 136 128  --    CALCIUM mg/dL 7.7* 7.7* 7.9* 7.7* 7.8* 7.9*  --    CALCIUM, IONIZED mmol/L  --   --   --   --   --   --  1.05*   MAGNESIUM mg/dL 2.0  --   --   --   --  1.5*  --    PHOSPHORUS mg/dL 2.3*  --   --   --   --  3.1  --      Results from last 7 days   Lab  Units 09/10/24  0330 09/10/24  0300 09/10/24  0031   AST U/L 49*  --  46*   ALT U/L 22  --  22   ALK PHOS U/L 103  --  112*   TOTAL PROTEIN g/dL 7.0  --  7.3   ALBUMIN g/dL 3.1*  --  3.1*   TOTAL BILIRUBIN mg/dL 2.35*  --  2.10*   AMMONIA umol/L  --  87*  --      Results from last 7 days   Lab Units 09/11/24  0608 09/10/24  2357 09/10/24  1804 09/10/24  0631 09/10/24  0237 09/09/24  2359   POC GLUCOSE mg/dl 141* 173* 154* 171* 203* 203*     Results from last 7 days   Lab Units 09/11/24  0609 09/10/24  2358 09/10/24  1806 09/10/24  1158 09/10/24  0541 09/10/24  0330 09/10/24  0031   GLUCOSE RANDOM mg/dL 154* 165* 179* 212* 178* 194* 202*     Results from last 7 days   Lab Units 09/11/24  0609 09/10/24  2358 09/10/24  1806 09/10/24  1158 09/10/24  0541 09/10/24  0259   OSMOLALITY, SERUM mmol/* 321* 317* 309* 304* 296     Results from last 7 days   Lab Units 09/10/24  0120   HEMOGLOBIN A1C % 5.6   EAG mg/dl 114     Results from last 7 days   Lab Units 09/10/24  0300   PH ART  7.440   PCO2 ART mm Hg 29.0*   PO2 ART mm Hg 137.9*   HCO3 ART mmol/L 19.3*   BASE EXC ART mmol/L -3.7   O2 CONTENT ART mL/dL 17.3   O2 HGB, ARTERIAL % 98.1*   ABG SOURCE  Line, Arterial       Results from last 7 days   Lab Units 09/10/24  0228 09/10/24  0031   PROTIME seconds 17.8* 18.2*   INR  1.44* 1.43*   PTT seconds  --  37*     Results from last 7 days   Lab Units 09/10/24  0541 09/10/24  0228   LACTIC ACID mmol/L 2.7* 3.9*     Results from last 7 days   Lab Units 09/11/24  0609 09/10/24  2358 09/10/24  1806 09/10/24  1158 09/10/24  0541 09/10/24  0259   OSMOLALITY, SERUM mmol/* 321* 317* 309* 304* 296     Results from last 7 days   Lab Units 09/10/24  0049   CLARITY UA  Clear   COLOR UA  Light Yellow   SPEC GRAV UA  1.014   PH UA  5.5   GLUCOSE UA mg/dl Negative   KETONES UA mg/dl Negative   BLOOD UA  Large*   PROTEIN UA mg/dl 30 (1+)*   NITRITE UA  Negative   BILIRUBIN UA  Negative   UROBILINOGEN UA (BE) mg/dl <2.0   LEUKOCYTES  UA  Negative   WBC UA /hpf 2-4*   RBC UA /hpf 20-30*   BACTERIA UA /hpf Occasional   EPITHELIAL CELLS WET PREP /hpf Occasional     Results from last 7 days   Lab Units 09/10/24  0048   AMPH/METH  Negative   BARBITURATE UR  Negative   BENZODIAZEPINE UR  Negative   COCAINE UR  Negative   METHADONE URINE  Negative   OPIATE UR  Negative   PCP UR  Negative   THC UR  Negative     Results from last 7 days   Lab Units 09/10/24  0031   ETHANOL LVL mg/dL <10       Past Medical History:   Diagnosis Date    Cirrhosis (HCC)     Diabetes mellitus (HCC)     Low platelet count (HCC)     Spleen enlarged      Present on Admission:   Type 2 diabetes mellitus without complication, without long-term current use of insulin (HCC)   Cirrhosis of liver without ascites (HCC)   Thrombocytopenia (HCC)      Admitting Diagnosis: Intracranial hemorrhage (HCC) [I62.9]  Age/Sex: 44 y.o. male  Admission Orders:  Scheduled Medications:  chlorhexidine, 15 mL, Mouth/Throat, Q12H DAYANA  insulin lispro, 1-5 Units, Subcutaneous, Q6H DAYANA  lactulose, 20 g, Oral, TID  levETIRAcetam, 750 mg, Intravenous, Q12H DAYANA  omeprazole (PRILOSEC) suspension 2 mg/mL, 20 mg, Per G Tube, Daily  potassium phosphate, 12 mmol, Intravenous, Once    Continuous IV Infusions:  niCARdipine, 1-15 mg/hr, Intravenous, Titrated  norepinephrine, 1-30 mcg/min, Intravenous, Titrated  propofol, 5-50 mcg/kg/min, Intravenous, Titrated  sodium chloride, 50 mL/hr, Intravenous, Continuous    PRN Meds:  acetaminophen, 650 mg, Oral, Q4H PRN; 9/10 x1  fentaNYL, 50 mcg, Intravenous, Q2H PRN; 9/10 x4, 9/10 x1  hydrALAZINE, 10 mg, Intravenous, Q6H PRN; 9/10 x1  labetalol, 10 mg, Intravenous, Q4H PRN; 9/10 x1  magnesium sulfate, 2 g, Intravenous; 9/10 x2  potassium chloride, 40 mEq, Oral; 9/10 x1    albumin human (FLEXBUMIN) 5 % injection 25 g  Dose: 25 g  Freq: Once Route: IV  Start: 09/11/24 1045 End: 09/11/24 1037  desmopressin (DDAVP) 25.6 mcg in sodium chloride 0.9 % 50 mL IVPB  Dose: 0.4  mcg/kg  Weight Dosing Info: 63.9 kg  Freq: Once Route: IV  Last Dose: Stopped (09/10/24 0554)  Start: 09/10/24 0345 End: 09/10/24 0554  levETIRAcetam (KEPPRA) injection 2,000 mg  Dose: 2,000 mg  Freq: Once Route: IV  Start: 09/10/24 0930 End: 09/10/24 1045  pantoprazole (PROTONIX) injection 40 mg  Dose: 40 mg  Freq: Every 12 hours scheduled Route: IV  Start: 09/10/24 0900 End: 09/10/24 1410  sodium chloride (HYPERTONIC) 3 % bolus 250 mL  Dose: 250 mL  Freq: Once Route: IV  Last Dose: Stopped (09/10/24 0400)  Start: 09/10/24 0300 End: 09/10/24 0400  sodium chloride 0.9 % bolus 500 mL  Dose: 500 mL  Freq: Once Route: IV  Last Dose: Stopped (09/10/24 1746)  Start: 09/10/24 1645 End: 09/10/24 1746  sodium chloride 0.9 % bolus 500 mL  Dose: 500 mL  Freq: Once Route: IV  Last Dose: Stopped (09/10/24 1642)  Start: 09/10/24 1545 End: 09/10/24 1642      IP CONSULT TO CASE MANAGEMENT  IP CONSULT TO NEUROSURGERY  IP CONSULT TO PHYSICAL MEDICINE REHAB  IP CONSULT TO NUTRITION SERVICES    Network Utilization Review Department  ATTENTION: Please call with any questions or concerns to [phone] and carefully listen to the prompts so that you are directed to the right person. All voicemails are confidential.   For Discharge needs, contact Care Management DC Support Team at [phone] opt. [phone]  Send all requests for admission clinical reviews, approved or denied determinations and any other requests to dedicated fax number below belonging to the campus where the patient is receiving treatment. List of dedicated fax numbers for the Facilities:  FACILITY NAME UR FAX NUMBER   ADMISSION DENIALS (Administrative/Medical Necessity) [phone]   DISCHARGE SUPPORT TEAM (NETWORK) [phone]   PARENT CHILD HEALTH (Maternity/NICU/Pediatrics) [phone]   [facility] [phone]   [facility] [phone]   [facility] [phone]   [facility]  [facility] [phone]   [facility] [phone]   [facility] [phone]   [facility] [phone]   [facility] [phone]   [facility] [phone]   [facility] [phone]   [facility] [phone]   [facility] [phone]

## 2024-09-12 NOTE — RESPIRATORY THERAPY NOTE
09/12/24 1701   Respiratory Assessment   Resp Comments Pt breathing was tachypniec on and off placed pt on pcmv as ordered by MD to rest and as documented. All alarms on and functioning, BVM present at bedside. Will continue to monitor.   Vent Information   Vent type Haas G5   Haas Vent Mode P-CMV/PCV+   $ Pulse Oximetry Spot Check Charge Completed   P-CMV/PCV+ Settings   Resp Rate (BPM) 14 BPM   Pressure Control/Pinsp (cmH20) 12 cmH20   Insp Time (S) 1 sec   FiO2 (%) 40 %   PEEP (cmH2O) 6 cmH2O   I:E Ratio 1:3.3   Insp Resistance 8   P-ramp (ms) 50 (ms)   Flow Trigger (LPM) 5 LPM   Humidification Heater   Heater Temp 95 °F (35 °C)   P-CMV/PCV+ Actuals   Resp Rate (BPM) 19 BPM   VT (mL) 506 mL   MV 9.6   MAP (cmH2O) 10 cmH2O   Peak Pressure (cmH2O) 20 cmH2O   I:E Ratio (Obs) 1:2.0   Static Compliance (mL/cmH2O) 35.8 mL/cmH2O   Heater Temperature (Obs) 95 °F (35 °C)   P-CMV/PCV+ Alarms    High Peak Pressure (cmH2O) 40 cmH2O   Low Pressure (cmH2O) 5 cm H2O   High Resp Rate (BPM) 40 BPM   Low Resp Rate (BPM) 8 BPM   High MV (L/min) 22 L/min   Low MV (L/min) 4 L/min   High Spont VTE (mL) 1000 mL   Low Spont VTE (mL) 250 mL

## 2024-09-12 NOTE — OCCUPATIONAL THERAPY NOTE
Occupational Therapy Cancel Note         Patient Name: Sid Noel  Today's Date: 9/12/2024 09/12/24 1358   OT Last Visit   OT Visit Date 09/12/24   Note Type   Note type Cancelled Session   Cancel Reasons Intubated/sedated       OT orders received. Chart reviewed. Pt intubated/sedated. Will continue to follow and see pt as appropriate and able.     Lorrie Randall MS, OTR/L

## 2024-09-12 NOTE — PROGRESS NOTES
Responded to nurse referral for family support. Nurse asked for visit because family just present in the patient's room. Meet with family  in the patient's room. They came from [place]. Introduced self. Explored about patient's relational resources. Provided supportive conversation with them. Also patient's wife present. Introduced self too for her. Family expressed their appreciates and thanked for stop by patient's room.  remain available for 24 hours as needed. Referral to unit  for supporting family.   09/12/24 1100   Clinical Encounter Type   Visited With Patient and family together;Patient not available;Health care provider   Routine Visit Introduction   Crisis Visit Critical Care   Referral From Nurse   Referral To    Gnosticism Encounters   Gnosticism Needs Prayer

## 2024-09-12 NOTE — ACP (ADVANCE CARE PLANNING)
2000 Spoke with family (wife, brother,and 2 other family members). DW them pt's status with extensive ICH 2/2 thrombocytopenia from chronic cirrhosis and who is not a surgical candidate due to thrombocytopenia and injury with no meaningful recovery. DW them options of continuing supportive care with plan for trach/peg and placement into long term care facility. Also discussed transitioning care to comfort focus and allowing natural death after withdrawing vent and medical treatment for injury. CT imagines were shown to family and all questions answered. They want to take tonight to discuss and consider options and they will come back tomorrow to talk with the team.

## 2024-09-12 NOTE — PROGRESS NOTES
Progress Note -     Name: Sid Noel 44 y.o. male I MRN: [id number]  Unit/Bed#: ICU 13 I Date of Admission: 9/10/2024   Date of Service: 9/12/2024 I Hospital Day: 2     This SmartSection is not supported in the current .     Pastoral Care Progress Note          Chaplaincy Interventions Utilized:      09/12/24 1300   Clinical Encounter Type   Visited With Family   Referral From    Family Spiritual Encounters   Family Coping Open/discussion  (Family is advocating for patient care passionately.)   Family Participation in Care 5   Family Support During Treatment   (Family travelled from [place] to support patient.)           Chaplaincy Outcomes Achieved:  Expressed intermediate hope    Patient's Family is looking to explore all options of patient care.      Spiritual Coping Strategies Utilized:        offered emotional support.

## 2024-09-12 NOTE — RESPIRATORY THERAPY NOTE
09/12/24 0743   Respiratory Assessment   Assessment Type Assess only   General Appearance Sedated   Respiratory Pattern Assisted   Chest Assessment Chest expansion symmetrical   Bilateral Breath Sounds Clear   Cough Strong   Suction Oral;ET Tube   Resp Comments Recieved pt on SPON 15/6+/40% tolerating at this time. Sx thick moderate tan secretions. Pt has strong cough during sx. Changed commercial tube oliveira to new securing ETT at 22cm at lips in center with tube oliveira. All alarms on and functioning, BVM present at bedside. Will continue to monitor pt.   Vent Information   Vent type Haas G5   Haas Vent Mode SPONT   $ Pulse Oximetry Spot Check Charge Completed   SPONT Settings   FIO2 (%) 40 %   PEEP (cmH2O) 6 cmH2O   Pressure Support (cmH2O) 15 cmH20   Flow Trigger (LPM) 5 LPM   P-ramp (ms) 50 ms   ETS  (%) 25 %   Humidification Heater   Heater Temp 97.7 °F (36.5 °C)   SPONT Actuals   Resp Rate (BPM) 23 BPM   VT (mL) 517 mL   MV (Obs) 12   MAP (cmH2O) 10 cmH2O   Peak Pressure (cmH2O) 22 cmH2O   I:E Ratio (Obs) 1:2.3   Heater Temperature (Obs) 93.2 °F (34 °C)   SPONT Alarms   High Peak Pressure (cmH20) 40 cmH2O   High Resp Rate (BPM) 40 BPM   High MV (L/min) 20 L/min   Low MV (L/min) 4 L/min   High Spont VTE (mL) 1000 mL   Low Spont VTE (mL) 250 mL   Apnea Time (S) 20 S   SPONT Apnea Settings   Resp Rate (BPM) 10 BPM   FIO2 (%) 100 %   %TI (%) 1.3 %   Apnea Time (S) 10 S   Maintenance   Alarm (pink) cable attached No   Resuscitation bag with peep valve at bedside Yes   Water bag changed No   Circuit changed No   Daily Screen   Patient safety screen outcome: Failed   Not Ready for Weaning due to: Underline problem not resolved   IHI Ventilator Associated Pneumonia Bundle   Daily Awakening Trials Performed Yes   Head of Bed Elevated HOB 30   ETT  Oral 8 mm   Placement Date/Time: 09/10/24 0012   Preoxygenated: Yes  Type: Oral  Tube Size: 8 mm  Laryngoscope: GlideScope  Location: Oral  Placement Verification:  Auscultation;End tidal CO2  Secured at (cm): 22   Secured at (cm) 22   Measured from Lips   Secured Location Center   Repositioned Right to Center   Secured by Commercial tube oliveira   Site Condition Dry   Cuff Pressure (cm H2O)   (MLT)   Cuff Pressure (color) Green   HI-LO Suction  Intermittent suction   HI-LO Secretions Lucio;Yellow   HI-LO Intervention Patent     RT Ventilator Management Note  Sid Noel 44 y.o. male MRN: [id number]  Unit/Bed#: ICU 13 Encounter: [id number]      Daily Screen         9/11/2024  0754 9/12/2024  0743          Patient safety screen outcome:: Failed Failed      Not Ready for Weaning due to:: -- Underline problem not resolved      Spont breathing trial % for 30 min: -- --                Physical Exam:   Assessment Type: Assess only  General Appearance: Sedated  Respiratory Pattern: Assisted  Chest Assessment: Chest expansion symmetrical  Bilateral Breath Sounds: Clear  Cough: Strong  Suction: Oral, ET Tube      Resp Comments: Recieved pt on SPON 15/6+/40% tolerating at this time. Sx thick moderate tan secretions. Pt has strong cough during sx. Changed commercial tube oliveira to new securing ETT at 22cm at lips in center with tube oliveira. All alarms on and functioning, BVM present at bedside. Will continue to monitor pt.

## 2024-09-12 NOTE — PROGRESS NOTES
Progress Note - Critical Care/ICU   Name: Sid Noel 44 y.o. male I MRN: [id number]  Unit/Bed#: ICU 13 I Date of Admission: 9/10/2024   Date of Service: 2024 I Hospital Day: 2      Assessment & Plan   Neuro/Psych:  Diagnoses: IPH  Imagin/10 CTH: Extensive intracranial hemorrhage with significant mass effect, right to left midline shift, and subfalcine herniation as detailed above.  9/10 CTA: Unremarkable CTA neck and brain without large vessel arterial occlusion, significant flow-limiting stenosis, or focal saccular aneurysm. No active contrast extravasation   Nuerosurgery consulted, determined non-op management  Neuro exam: Persisting decorticate posturing, retained reflexes  Plan:  Neuro checks q1h  Keppra 750mg q12h  Na goal 145-155, D5 bolus and infusion ordered with repeat BMP  Analgesia: Multimodal. PRN tylenol, fentanyl    CV:  Diagnoses: HLD  Plan:  Continue statin  PRN hydralazine, labetolol q6h prn BP >150  Levophed to maintain MAP >65  Continuous cardiopulmonary monitoring. Maintain MAP >65.    Pulm:  Diagnoses: Acute respiratory failure secondary to mental status  Vent: Spont 6/6 40%.   Plan:  Daily SAT/SBT. Wean FiO2 and PEEP as tolerated.   Continuous pulse oximetry. Maintain O2 sat >92%.     GI:  Diagnoses: H/o cirrhosis, varices, portal HTN  Plan:  Continue Lactulose  GI prophylaxis: Prilosec    :  Diagnoses: Urinary leakage around marshall  Creatinine trend: mild increase, 0.7  Baseline creatinine: 0.5  Plan:  Consider condom catheter and d/c marshall  Monitor I/Os.    F/E/N:  Diagnoses: Hypernatremia 160  Plan:   F: D5 bolus today, continuous infusion @ 50.   E: Monitor and replete electrolytes for Mg >2, Phos >3, K >4.  N: NPO, 250cc free water flushes    Heme/Onc:  Diagnoses: Thrombocytopenia, Anemia  PTL 53 this morning (45 yesterday)  Hgb 10.2 (12.4 2 days ago)  Plan:  CBC q am  Maintain PTL >100   VTE prophylaxis: none    Endo:  Diagnoses: DM type 2  Plan:   Insulin: SSI.   Monitor BG  q6h.    ID:  Diagnoses: Fever  Culture data: UA without suggestion of infection  Labs: WBC 4.42  Temperature: Intermittent fevers, tmax 101.5, responsive to tylenol  Plan:  Suspected central cause of fever  Continue tylenol q4h prn fever  Abx: consider if WBC abnormal or likely infection identified  Monitor fever curve and WBC.    MSK/Skin:  No active issues  Plan:  PT/OT when appropriate    Disposition: Critical care    ICU Core Measures     Vented Patient  VAP Bundle  VAP bundle ordered     A: Assess, Prevent, and Manage Pain Has pain been assessed? Yes  Need for changes to pain regimen? No   B: Both Spontaneous Awakening Trials (SATs) and Spontaneous Breathing Trials (SBTs) Plan to perform spontaneous awakening trial today? N/A   Plan to perform spontaneous breathing trial today? Yes   Obvious barriers to extubation? Yes   C: Choice of Sedation RASS Goal: -5 Unarousable  Need for changes to sedation or analgesia regimen? No   D: Delirium CAM-ICU: indeterminate d/t unresponsive state   E: Early Mobility  Plan for early mobility? NA   F: Family Engagement Plan for family engagement today? Yes       Review of Invasive Devices:    [place]da Plan: Continue for accurate I/O monitoring for 48 hours  Central access plan: Medications requiring central line  [place] Plan: Keep arterial line for hemodynamic monitoring and frequent labs    Prophylaxis:  VTE Contraindicated secondary to: ICH   Stress Ulcer  covered byomeprazole (PRILOSEC) suspension 2 mg/mL [[id number]]         24 Hour Events   24hr events: No acute events overnight. Will plan for a discussion with family today regarding goals of care.     Subjective       Objective                          Vitals I/O      Most Recent Min/Max in 24hrs   Temp (!) 101.5 °F (38.6 °C) Temp  Min: 96.8 °F (36 °C)  Max: 101.8 °F (38.8 °C)   Pulse 56 Pulse  Min: 50  Max: 96   Resp 20 Resp  Min: 8  Max: 26   /59 BP  Min: 90/47  Max: 166/88   O2 Sat 99 % SpO2  Min: 97 %  Max: 100 %       Intake/Output Summary (Last 24 hours) at 9/12/2024 0919  Last data filed at 9/12/2024 0600  Gross per 24 hour   Intake 1957.92 ml   Output 555 ml   Net 1402.92 ml       Diet NPO    Invasive Monitoring           Physical Exam   Physical Exam  Eyes:      Comments: Pupils appear minimally responsive to light, 1mm bilaterally   Skin:     General: Skin is warm.      Capillary Refill: Capillary refill takes less than 2 seconds.   HENT:      Head: Normocephalic and atraumatic.      Mouth/Throat:      Mouth: Mucous membranes are moist.   Cardiovascular:      Rate and Rhythm: Normal rate and regular rhythm.      Pulses: No decreased pulses.      Heart sounds: No murmur heard.  Musculoskeletal:      Right lower leg: No edema.      Left lower leg: No edema.   Abdominal: General: Bowel sounds are normal.      Palpations: Abdomen is soft.   Constitutional:       General: He is not in acute distress.     Appearance: He is well-developed and well-nourished. He is diaphoretic. He is not ill-appearing or toxic-appearing.      Interventions: He is intubated.   Pulmonary:      Effort: He is intubated.      Breath sounds: Rhonchi and rales present.   Neurological:      Mental Status: He is unresponsive.        Corneal reflex present, cough reflex and gag reflex intact.      Comments: Possible withdrawal to pain in RUE, decorticate posturing consistently in all extremities, oculocephalic reflex absent   Genitourinary/Anorectal:  Lorenz present.        Diagnostic Studies        Lab Results: I have reviewed the following results:      Medications:  Scheduled PRN   chlorhexidine, 15 mL, Q12H DAYANA  insulin lispro, 1-5 Units, Q6H DAYANA  lactulose, 20 g, TID  levETIRAcetam, 750 mg, Q12H DAYANA  omeprazole (PRILOSEC) suspension 2 mg/mL, 20 mg, Daily      acetaminophen, 650 mg, Q4H PRN  fentaNYL, 50 mcg, Q2H PRN  hydrALAZINE, 10 mg, Q6H PRN  labetalol, 10 mg, Q4H PRN       Continuous    norepinephrine, 1-30 mcg/min, Last Rate: 3 mcg/min (09/12/24  0551)         Labs:   CBC    Recent Labs     09/11/24  0609 09/12/24  0546   WBC  --  4.42   HGB  --  10.2*   HCT  --  32.0*   PLT 45* 53*     BMP    Recent Labs     09/11/24  1758 09/12/24  0546   SODIUM 159* 160*   K 3.5 3.1*   * 134*   CO2 14* 16*   AGAP 10 10   BUN 10 13   CREATININE 0.61 0.71   CALCIUM 7.9* 8.3*       Coags    No recent results     Additional Electrolytes  Recent Labs     09/11/24  0609 09/12/24  0546   MG 2.0 1.7*   PHOS 2.3* 1.6*          Blood Gas    No recent results  No recent results LFTs  No recent results    Infectious  No recent results  Glucose  Recent Labs     09/11/24  0609 09/11/24  1118 09/11/24  1758 09/12/24  0546   GLUC 154* 158* 133 149*

## 2024-09-12 NOTE — TELEPHONE ENCOUNTER
"Dr. Quesada attempted to contact patient's wife between 6 pm and 6:15 pm on 9/11  Each time, Dr. Quesada introduced himself and \"can you hear me\"   Response was yes  Same vice versa with patient and Dr. Quesada  Line was disconnected all 3 times    Patient is currently admitted now at this time  "

## 2024-09-12 NOTE — RESPIRATORY THERAPY NOTE
RT Ventilator Management Note  Sid Noel 44 y.o. male MRN: [id number]  Unit/Bed#: ICU 13 Encounter: [id number]      Daily Screen         9/10/2024  1623 9/11/2024  0754          Patient safety screen outcome:: Failed Failed      Not Ready for Weaning due to:: Underline problem not resolved --      Spont breathing trial % for 30 min: -- --                Physical Exam:   Assessment Type: Assess only  General Appearance: Sedated  Respiratory Pattern: (P) Assisted  Chest Assessment: (P) Chest expansion symmetrical  Bilateral Breath Sounds: (P) Clear  Cough: (P) Strong  Suction: ET Tube      Resp Comments: (P) No changes made to vent through the night. Tolerating pressure support well at this time

## 2024-09-13 NOTE — PLAN OF CARE
Problem: INFECTION - ADULT  Goal: Absence or prevention of progression during hospitalization  Description: INTERVENTIONS:  - Assess and monitor for signs and symptoms of infection  - Monitor lab/diagnostic results  - Monitor all insertion sites, i.e. indwelling lines, tubes, and drains  - Monitor endotracheal if appropriate and nasal secretions for changes in amount and color  - [place] appropriate cooling/warming therapies per order  - Administer medications as ordered  - Instruct and encourage patient and family to use good hand hygiene technique  - Identify and instruct in appropriate isolation precautions for identified infection/condition  Outcome: Progressing     Problem: SAFETY ADULT  Goal: Patient will remain free of falls  Description: INTERVENTIONS:  - Educate patient/family on patient safety including physical limitations  - Instruct patient to call for assistance with activity   - Consult OT/PT to assist with strengthening/mobility   - Keep Call bell within reach  - Keep bed low and locked with side rails adjusted as appropriate  - Keep care items and personal belongings within reach  - Initiate and maintain comfort rounds  - Make Fall Risk Sign visible to staff  - Offer Toileting every 2 Hours, in advance of need  - Initiate/Maintain bed alarm  - Obtain necessary fall risk management equipment:   Problem: Neurological Deficit  Goal: Neurological status is stable or improving  Description: Interventions:  - Monitor and assess patient's level of consciousness, motor function, sensory function, and level of assistance needed for ADLs.   - Monitor and report changes from baseline. Collaborate with interdisciplinary team to initiate plan and implement interventions as ordered.   - Provide and maintain a safe environment.  - Consider seizure precautions.  - Consider fall precautions.  - Consider aspiration precautions.  - Consider bleeding precautions.  Outcome: Progressing     Problem: Knowledge  Deficit  Goal: Patient/family/caregiver demonstrates understanding of disease process, treatment plan, medications, and discharge instructions  Description: Complete learning assessment and assess knowledge base.  Interventions:  - Provide teaching at level of understanding  - Provide teaching via preferred learning methods  Outcome: Progressing     Problem: Potential for Aspiration  Goal: Non-ventilated patient's risk of aspiration is minimized  Description: Assess and monitor vital signs, respiratory status, and labs (WBC).  Monitor for signs of aspiration (tachypnea, cough, rales, wheezing, cyanosis, fever).    - Assess and monitor patient's ability to swallow.  - Place patient up in chair to eat if possible.  - HOB up at 90 degrees to eat if unable to get patient up into chair.  - Supervise patient during oral intake.   - Instruct patient/ family to take small bites.  - Instruct patient/ family to take small single sips when taking liquids.  - Follow patient-specific strategies generated by speech pathologist.  Outcome: Progressing     - Apply yellow socks and bracelet for high fall risk patients  - Consider moving patient to room near nurses station  Outcome: Progressing

## 2024-09-13 NOTE — CASE MANAGEMENT
Case Management Discharge Planning Note    Patient name Sid Noel  Location ICU 13/ICU 13 MRN [id number]  : 1980 Date 2024       Current Admission Date: 9/10/2024  Current Admission Diagnosis:ICH (intracerebral hemorrhage) (HCC)   Patient Active Problem List    Diagnosis Date Noted Date Diagnosed    ICH (intracerebral hemorrhage) (HCC) 09/10/2024     Altered mental status 09/10/2024     Exertional dyspnea 2024     Iron deficiency anemia due to chronic blood loss 2024     Hives 2024     Type 2 diabetes mellitus without complication, without long-term current use of insulin (HCC) 2024     Cirrhosis of liver without ascites (HCC) 2024     Thrombocytopenia (HCC) 2024     Bleeding disorder (HCC) 2024     Shortness of breath 2024     Edema of both ankles 2024     Chronic cough 2024     Urine discoloration 2024     Splenomegaly 2024     Dyslipidemia 2024       LOS (days): 3  Geometric Mean LOS (GMLOS) (days): 4.6  Days to GMLOS:1     OBJECTIVE:  Risk of Unplanned Readmission Score: 13.57         Current admission status: Inpatient   Preferred Pharmacy:   UNKNOWN - FOLLOW UP PRIOR TO DISCHARGE TO E-PRESCRIBE  No address on file      [id number] [place], [place] - [id number] [facility] [address]  Phone: [phone] Fax: [phone]    [id number] [place] - [address]  [address] [facility] [address]  Phone: [phone] Fax: [phone]    Primary Care Provider: Maicol Morris MD    Primary Insurance: BLUE CROSS  Secondary Insurance:     DISCHARGE DETAILS:     CM office contacted for assistance with visitors visa for family from [facility]. CM reached out to supervisor for assistance and was told that pt's family should contact [employer] for assistance with visa. [facility] might require clinical documentation, which the family can obtain  via Medical Records. CM reached out to nursing (Tomeka VALDERRAMA) to provide information, including medical records number ([phone]) and their hours (M-F 8-4:30); Tomeka will provide information to family.

## 2024-09-13 NOTE — PLAN OF CARE
Problem: PAIN - ADULT  Goal: Verbalizes/displays adequate comfort level or baseline comfort level  Description: Interventions:  - Encourage patient to monitor pain and request assistance  - Assess pain using appropriate pain scale  - Administer analgesics based on type and severity of pain and evaluate response  - Implement non-pharmacological measures as appropriate and evaluate response  - Consider cultural and social influences on pain and pain management  - Notify physician/advanced practitioner if interventions unsuccessful or patient reports new pain  Outcome: Progressing     Problem: INFECTION - ADULT  Goal: Absence or prevention of progression during hospitalization  Description: INTERVENTIONS:  - Assess and monitor for signs and symptoms of infection  - Monitor lab/diagnostic results  - Monitor all insertion sites, i.e. indwelling lines, tubes, and drains  - Monitor endotracheal if appropriate and nasal secretions for changes in amount and color  - [place] appropriate cooling/warming therapies per order  - Administer medications as ordered  - Instruct and encourage patient and family to use good hand hygiene technique  - Identify and instruct in appropriate isolation precautions for identified infection/condition  Outcome: Progressing  Goal: Absence of fever/infection during neutropenic period  Description: INTERVENTIONS:  - Monitor WBC    Outcome: Progressing     Problem: SAFETY ADULT  Goal: Patient will remain free of falls  Description: INTERVENTIONS:  - Educate patient/family on patient safety including physical limitations  - Instruct patient to call for assistance with activity   - Consult OT/PT to assist with strengthening/mobility   - Keep Call bell within reach  - Keep bed low and locked with side rails adjusted as appropriate  - Keep care items and personal belongings within reach  - Initiate and maintain comfort rounds  - Make Fall Risk Sign visible to staff  - Offer Toileting every  Hours,  in advance of need  - Initiate/Maintain alarm  - Obtain necessary fall risk management equipment:   - Apply yellow socks and bracelet for high fall risk patients  - Consider moving patient to room near nurses station  Outcome: Progressing  Goal: Maintain or return to baseline ADL function  Description: INTERVENTIONS:  -  Assess patient's ability to carry out ADLs; assess patient's baseline for ADL function and identify physical deficits which impact ability to perform ADLs (bathing, care of mouth/teeth, toileting, grooming, dressing, etc.)  - Assess/evaluate cause of self-care deficits   - Assess range of motion  - Assess patient's mobility; develop plan if impaired  - Assess patient's need for assistive devices and provide as appropriate  - Encourage maximum independence but intervene and supervise when necessary  - Involve family in performance of ADLs  - Assess for home care needs following discharge   - Consider OT consult to assist with ADL evaluation and planning for discharge  - Provide patient education as appropriate  Outcome: Progressing  Goal: Maintains/Returns to pre admission functional level  Description: INTERVENTIONS:  - Perform AM-PAC 6 Click Basic Mobility/ Daily Activity assessment daily.  - Set and communicate daily mobility goal to care team and patient/family/caregiver.   - Collaborate with rehabilitation services on mobility goals if consulted  - Perform Range of Motion  times a day.  - Reposition patient every  hours.  - Dangle patient  times a day  - Stand patient  times a day  - Ambulate patient  times a day  - Out of bed to chair  times a day   - Out of bed for meals times a day  - Out of bed for toileting  - Record patient progress and toleration of activity level   Outcome: Progressing     Problem: DISCHARGE PLANNING  Goal: Discharge to home or other facility with appropriate resources  Description: INTERVENTIONS:  - Identify barriers to discharge w/patient and caregiver  - Arrange for  needed discharge resources and transportation as appropriate  - Identify discharge learning needs (meds, wound care, etc.)  - Arrange for interpretive services to assist at discharge as needed  - Refer to Case Management Department for coordinating discharge planning if the patient needs post-hospital services based on physician/advanced practitioner order or complex needs related to functional status, cognitive ability, or social support system  Outcome: Progressing

## 2024-09-13 NOTE — RESPIRATORY THERAPY NOTE
RT Ventilator Management Note  Sid Noel 44 y.o. male MRN: [id number]  Unit/Bed#: ICU 13 Encounter: [id number]      Daily Screen         9/11/2024  0754 9/12/2024  0743          Patient safety screen outcome:: Failed Failed      Not Ready for Weaning due to:: -- Underline problem not resolved      Spont breathing trial % for 30 min: -- --                Physical Exam:   Assessment Type: (P) Assess only  General Appearance: (P) Sedated  Respiratory Pattern: (P) Assisted  Chest Assessment: (P) Chest expansion symmetrical  Bilateral Breath Sounds: (P) Coarse  Cough: (P) Productive  Suction: (P) ET Tube  O2 Device: (P) Ventilator      Resp Comments: (P) Pt. remains on CMV/PC mode.  No changes throughout the night.  Will continue to monitor pt per protocol.

## 2024-09-13 NOTE — PHYSICAL THERAPY NOTE
Physical Therapy Cancellation Note    PT orders received chart review completed. Pt is currently intubated/sedated and not appropriate to participate in skilled PT at this time. PT will sign off at this time, please re-consult as medically appropriate.     09/13/24 1300   Note Type   Note type Cancelled Session;Evaluation   Cancel Reasons Intubated/sedated       Sri Talavera, PT

## 2024-09-13 NOTE — RESPIRATORY THERAPY NOTE
RT Ventilator Management Note  Sid Noel 44 y.o. male MRN: [id number]  Unit/Bed#: ICU 13 Encounter: [id number]      Daily Screen         9/12/2024  0743 9/13/2024  0700          Patient safety screen outcome:: Failed Passed      Not Ready for Weaning due to:: Underline problem not resolved --                Physical Exam:   Assessment Type: Assess only  General Appearance: Sedated  Respiratory Pattern: Assisted, Spontaneous  Chest Assessment: Chest expansion symmetrical  Bilateral Breath Sounds: Coarse  Cough: None  Suction: ET Tube  O2 Device: Ventilator      Resp Comments: (P) Pt found on SPONT, pt is resting well on the weaning mode, will continue to monitor

## 2024-09-13 NOTE — PROGRESS NOTES
Progress Note - Critical Care/ICU   Name: Sid Noel 44 y.o. male I MRN: [id number]  Unit/Bed#: ICU 13 I Date of Admission: 9/10/2024   Date of Service: 9/13/2024 I Hospital Day: 3       Assessment & Plan   Neuro:   Diagnosis: IPH  CTH-Extensive intracranial hemorrhage with significant mass effect, right to left midline shift, and subfalcine herniation as detailed above.   9/10 CTA: Unremarkable CTA neck and brain without large vessel arterial occlusion, significant flow-limiting stenosis, or focal saccular aneurysm. No active contrast extravasation   Per neurosurgery, no indication for surgical intervention at this time  EEG negative for epileptiform activity  Neuroexam: Persistant decorticate and decerebrate posturing  Plan:   Neurochecks every 1 hours  Keppra 750 mg every 12 hours, consider DC with negative VEEG  Na goal 145-155  Received another D5 bolus overnight  Analgesia: Multimodal, as needed Tylenol, fentanyl    CV:   Diagnosis: HLD  Home medication: atorvastatin 10  Plan:   Hold statin   Diagnosis: blood pressure control  Plan:   Continuous cardiopulmonary monitoring. Maintain MAP >65.  PRN hydralazine, labetolol q6h prn BP >150  Levophed to maintain MAP >65  Pulm:  Diagnosis: Acute respiratory failure secondary to mental status  Vent: Spont 6/6 40%.   Plan:  Daily SAT/SBT. Wean FiO2 and PEEP as tolerated.   Continuous pulse oximetry. Maintain O2 sat >92%.     GI:   Diagnosis: Hx cirrhosis/varices/portal htn  No biopsy 2/2 low plts  Follows with Dr. Pyle  Varices seen on MRI, no EGD completed  Ammonia lvl wnl  Plan:   Monitor for s/s bleeding  Initiated on lactulose, continue for optimization of acid-base status prior to family discussions    :   Diagnosis: urinary leakage  Issues with marshall ON, leakage around marshall; concern that marshall catheter size is too small  Concern for exchanging due to thrombocytopenia  Plan:   Consider exchanging for condom catheter if strict IO not necessary  Monitor  UO  Diagnosis: Respiratory alkalosis   Likely 2/2 to neuro insult  Plan:   Monitor BMP  Consider switching vent settings    F/E/N:   Diagnoses: Hypernatremia- resolving  Plan:   F: none  E: Monitor and replete electrolytes for Mg >2, Phos >3, K >4.  N: NPO, 250cc free water flushes       Heme/Onc:   Diagnosis: thrombocytopenia 2/2 cirrhosis  Prior BM biopsy no reports available  Plan:   Holding plt transfusion due to no plan for surgical intervention  Monitor CBC  Hold VTE ppx due to IPH    Endo:   Diagnosis: DM2  A1c 5.6  Plan:   Hold home metformin  SSI  Monitor BG q6h       ID:   Diagnosis: fever  Negative UA for infection  WBC wnl  Tmax .8  Likely 2/2 central  Plan:   Tylenol q4h PRN  Monitor fever curve and WC    MSK/Skin:   No active issues  Disposition: Critical care    ICU Core Measures     Vented Patient  VAP Bundle  VAP bundle ordered     A: Assess, Prevent, and Manage Pain Has pain been assessed? Yes  Need for changes to pain regimen? No   B: Both Spontaneous Awakening Trials (SATs) and Spontaneous Breathing Trials (SBTs) Plan to perform spontaneous awakening trial today? N/A   Plan to perform spontaneous breathing trial today? Yes   Obvious barriers to extubation? Yes   C: Choice of Sedation RASS Goal: 0 Alert and Calm  Need for changes to sedation or analgesia regimen? No   D: Delirium CAM-ICU: Unable to perform secondary to Traumatic Brain Injury   E: Early Mobility  Plan for early mobility? NA   F: Family Engagement Plan for family engagement today? Yes       Review of Invasive Devices:    [place] Plan: Continue for accurate I/O monitoring for 48 hours  Central access plan: Medications requiring central line  [place] Plan: Keep arterial line for hemodynamic monitoring    Prophylaxis:  VTE Contraindicated secondary to: ICH   Stress Ulcer  covered byomeprazole (PRILOSEC) suspension 2 mg/mL [[id number]]         24 Hour Events   24hr events: Received a bolus of D5 overnight for hypernatremia concern.  He  was on CMV throughout the night.  Tmax overnight was 100.8  Subjective   Unarousable to verbal or tactile stimuli.        Objective                          Vitals I/O      Most Recent Min/Max in 24hrs   Temp 100.4 °F (38 °C) Temp  Min: 99.7 °F (37.6 °C)  Max: 101.5 °F (38.6 °C)   Pulse 70 Pulse  Min: 50  Max: 94   Resp 21 Resp  Min: 17  Max: 25   /75 BP  Min: 107/48  Max: 154/75   O2 Sat 97 % SpO2  Min: 95 %  Max: 99 %      Intake/Output Summary (Last 24 hours) at 9/13/2024 0639  Last data filed at 9/13/2024 0600  Gross per 24 hour   Intake 3753 ml   Output 920 ml   Net 2833 ml       Diet NPO    Invasive Monitoring           Physical Exam   Physical Exam  Eyes:      Comments: OU 1mm pupils, NR   Skin:     General: Skin is warm.      Capillary Refill: Capillary refill takes less than 2 seconds.   HENT:      Head: Normocephalic and atraumatic.      Mouth/Throat:      Mouth: Mucous membranes are moist.   Cardiovascular:      Rate and Rhythm: Normal rate and regular rhythm.      Heart sounds: No murmur heard.  Musculoskeletal:         General: No swelling.      Right lower leg: No edema.      Left lower leg: No edema.   Abdominal: General: Bowel sounds are normal. There is no distension.      Palpations: Abdomen is soft.   Constitutional:       General: He is not in acute distress.     Appearance: He is well-developed and well-nourished. He is not ill-appearing or toxic-appearing.      Interventions: He is sedated, intubated and restrained.   Pulmonary:      Effort: Pulmonary effort is normal. He is intubated.      Breath sounds: Rhonchi and rales present.   Neurological:      Mental Status: He is unresponsive.      GCS: GCS eye subscore is 1. GCS verbal subscore is 1. GCS motor subscore is 3.        Corneal reflex absent, cough reflex and gag reflex intact.      Comments: withdrawal to pain in b/l UE, decorticate posturing consistently in all extremities, oculocephalic reflex absent    Genitourinary/Anorectal:  Lorenz present.        Diagnostic Studies        Lab Results: I have reviewed the following results:      Medications:  Scheduled PRN   chlorhexidine, 15 mL, Q12H DAYANA  insulin lispro, 1-5 Units, Q6H DAYANA  lactulose, 20 g, TID  levETIRAcetam, 750 mg, Q12H DAYANA  omeprazole (PRILOSEC) suspension 2 mg/mL, 20 mg, Daily  potassium phosphate, 30 mmol, BID (AM & Afternoon)      acetaminophen, 650 mg, Q4H PRN  fentaNYL, 50 mcg, Q2H PRN  hydrALAZINE, 10 mg, Q6H PRN  labetalol, 10 mg, Q4H PRN       Continuous    dextrose, 50 mL/hr, Last Rate: 50 mL/hr (09/12/24 1706)  norepinephrine, 1-30 mcg/min, Last Rate: 3 mcg/min (09/13/24 0322)         Labs:   CBC    Recent Labs     09/12/24  0546   WBC 4.42   HGB 10.2*   HCT 32.0*   PLT 53*     BMP    Recent Labs     09/12/24 1725 09/12/24 2019   SODIUM 154* 153*   K 3.1* 3.9   * 128*   CO2 16* 17*   AGAP 8 8   BUN 14 14   CREATININE 0.76 0.75   CALCIUM 7.9* 8.1*       Coags    No recent results     Additional Electrolytes  Recent Labs     09/12/24  0546   MG 1.7*   PHOS 1.6*          Blood Gas    Recent Labs     09/13/24  0516   PHART 7.509*   [id number] 19.4*   PO2ART 82.4   [id number] 15.1*   BEART -6.1   SOURCE Line, Arterial     Recent Labs     09/13/24  0516   SOURCE Line, Arterial    LFTs  No recent results    Infectious  No recent results  Glucose  Recent Labs     09/11/24  1758 09/12/24  0546 09/12/24  1725 09/12/24  2019   GLUC 133 149* 278* 191*

## 2024-09-13 NOTE — RESPIRATORY THERAPY NOTE
RT Ventilator Management Note  Sid Noel 44 y.o. male MRN: [id number]  Unit/Bed#: ICU 13 Encounter: [id number]      Daily Screen         9/11/2024  0754 9/12/2024  0743          Patient safety screen outcome:: Failed Failed      Not Ready for Weaning due to:: -- Underline problem not resolved      Spont breathing trial % for 30 min: -- --                Physical Exam:   Assessment Type: Assess only  General Appearance: Sedated  Respiratory Pattern: Assisted, Spontaneous  Chest Assessment: Chest expansion symmetrical  Bilateral Breath Sounds: Coarse  Cough: None  Suction: ET Tube  O2 Device: Ventilator      Resp Comments: Pt. placed on CPAP/PS mode at this time.

## 2024-09-13 NOTE — OCCUPATIONAL THERAPY NOTE
Occupational Therapy Cancel Note        Patient Name: Sid Noel  Today's Date: 9/13/2024 09/13/24 0829   OT Last Visit   OT Visit Date 09/13/24   Note Type   Note type Cancelled Session   Cancel Reasons Intubated/sedated       OT orders received. Chart reviewed. Pt remains intubated/sedated. Will d/c OT orders at this time. Please re-consult as appropriate. Thank you!    Lorrie Randall MS, OTR/L

## 2024-09-13 NOTE — PLAN OF CARE
09/13/24 6483   Recommendations/Interventions   Intervention Comments TF recommendations if aligned with goals of care; jevity 1.2 at 65ml/hr will provide 1560ml, 1872kcal (29kcal/kg), 86g protein (1.3g/kg), 1259ml free water from TF formula       Problem: Nutrition/Hydration-ADULT  Goal: Nutrient/Hydration intake appropriate for improving, restoring or maintaining nutritional needs  Description: Monitor and assess patient's nutrition/hydration status for malnutrition. Collaborate with interdisciplinary team and initiate plan and interventions as ordered.  Monitor patient's weight and dietary intake as ordered or per policy. Utilize nutrition screening tool and intervene as necessary. Determine patient's food preferences and provide high-protein, high-caloric foods as appropriate.     INTERVENTIONS:  - Monitor oral intake, urinary output, labs, and treatment plans  - Assess nutrition and hydration status and recommend course of action  - Evaluate amount of meals eaten  - Assist patient with eating if necessary   - Allow adequate time for meals  - Recommend/ encourage appropriate diets, oral nutritional supplements, and vitamin/mineral supplements  - Order, calculate, and assess calorie counts as needed  - Recommend, monitor, and adjust tube feedings and TPN/PPN based on assessed needs  - Assess need for intravenous fluids  - Provide specific nutrition/hydration education as appropriate  - Include patient/family/caregiver in decisions related to nutrition  Outcome: Not Progressing

## 2024-09-14 PROBLEM — Z51.5 PALLIATIVE CARE ENCOUNTER: Status: ACTIVE | Noted: 2024-01-01

## 2024-09-14 PROBLEM — Z71.89 GOALS OF CARE, COUNSELING/DISCUSSION: Status: ACTIVE | Noted: 2024-01-01

## 2024-09-14 NOTE — ASSESSMENT & PLAN NOTE
Crytogenic cirrhosis with portal HTN, varices, hyperammonemia (87) on admission   Follows with Dr. Jose Alejandro Gant GI/hepatology and Dr. Edenilson VILLAR GI  Unable to biopsy 2/2 thrombocytopenia  Management per ICU

## 2024-09-14 NOTE — ASSESSMENT & PLAN NOTE
Social support:  Supportive listening provided  Provided anticipatory guidance  Discussed spiritual needs : Sikh, family declines  support services  Advocated for patient/family with interdisciplinary care team    Coordination of care:  Reviewed case with ICU Dr. Pruitt/Dr. Arriola, RN Tomeka    Follow up  Palliative Care will continue to follow and goals of care discussions will be ongoing.   Please reach out via [employer] secure chat if questions or concerns arise.    I have reviewed the patient's controlled substance dispensing history in the Prescription Drug Monitoring Program in compliance with the [place] regulations before prescribing any controlled substances.  Last refills  No opioid or benzo refills in [place] over past year.    We appreciate the invitation to be involved in this patient's care. Please do not hesitate to reach our on call provider through our clinic answering service at [phone] should you have acute symptom control concerns.

## 2024-09-14 NOTE — SEPSIS NOTE
Sepsis Note   Sid Noel 44 y.o. male MRN: [id number]  Unit/Bed#: ICU 13 Encounter: [id number]       Initial Sepsis Screening       Row Name 09/13/24 2356 09/13/24 2315 09/11/24 0541          Is the patient's history suggestive of a new or worsening infection? --  -LB -- No  -JZ      Suspected source of infection --  -LB -- --      Indicate SIRS criteria --  -LB Hyperthemia > 38.3C (100.9F) OR Hypothermia <36C (96.8F);Leukocytosis (WBC > 88517 IJL) OR Leukopenia (WBC <4000 IJL) OR Bandemia (WBC >10% bands)  -LB --      Are two or more of the above signs & symptoms of infection both present and new to the patient? -- Yes (Proceed)  -LB --      Assess for evidence of organ dysfunction: Are any of the below criteria present within 6 hours of suspected infection and SIRS criteria that are NOT considered to be chronic conditions? -- -- --      Date of presentation of septic shock -- -- --      Time of presentation of septic shock -- -- --      Fluid Resuscitation: -- -- --                User Key  (r) = Recorded By, (t) = Taken By, (c) = Cosigned By      Initials Name Provider Type    CHICA Simpson PA-C Physician Assistant    VIANCA Giordano Nurse Practitioner                        Body mass index is 23.44 kg/m².  Wt Readings from Last 1 Encounters:   09/09/24 63.9 kg (140 lb 14 oz)        Ideal body weight: 61.5 kg (135 lb 9.3 oz)  Adjusted ideal body weight: 62.5 kg (137 lb 11.2 oz)

## 2024-09-14 NOTE — ASSESSMENT & PLAN NOTE
Goals:  Level 1 code status  Disease focused care without limits placed at this time  Plan for terminal extubation Monday morning, timing TBD  Will continue discussions regarding GOC as patient's clinical presentation evolves.  Encouraged follow up with Palliative Medicine on an outpatient basis after discharge for continued symptom management.  Our office will contact patient to schedule a hospital follow up.    Decisional apparatus:  Patient does not have capacity on exam today.  If capacity is lost, patient's substitute decision maker would default to spouse by [place].  ER contacts:Keith Calvin (Spouse)  [phone] (Mobile)   Advance Directive/Living Will/POLST: none on file  POA Forms: none on file

## 2024-09-14 NOTE — PROGRESS NOTES
Progress Note - Critical Care/ICU   Name: Sid Noel 44 y.o. male I MRN: [id number]  Unit/Bed#: ICU 13 I Date of Admission: 9/10/2024   Date of Service: 9/14/2024 I Hospital Day: 4       Assessment & Plan   Neuro:   Diagnosis: IPH  CTH-Extensive intracranial hemorrhage with significant mass effect, right to left midline shift, and subfalcine herniation as detailed above.   9/10 CTA: Unremarkable CTA neck and brain without large vessel arterial occlusion, significant flow-limiting stenosis, or focal saccular aneurysm. No active contrast extravasation   Per neurosurgery, no indication for surgical intervention at this time  EEG negative for epileptiform activity  Neuroexam: Persistant decorticate and decerebrate posturing  Plan:   Neurochecks every 1 hours  Keppra 750 mg every 12 hours, consider DC with negative VEEG  Na goal 145-155  On D5W drip, d/c  Analgesia: Multimodal, as needed Tylenol, fentanyl  Plan to go comfort care tomorrow evening    CV:   Diagnosis: HLD  Home medication: atorvastatin 10  Plan:   Hold statin   Diagnosis: labile blood pressures  Likely multifactorial: herniation, sepsis  Plan:   Continuous cardiopulmonary monitoring. Maintain MAP >65.  PRN hydralazine, labetolol q6h prn BP >150  Levophed to maintain MAP >65  See sepsis plan  Pulm:  Diagnosis: Acute respiratory failure secondary to mental status  Vent: Spont 6/6 40%.   Intermittently tachypneic  ABG this AM still showing respiratory alkalosis  Plan:  Daily SAT/SBT. Wean FiO2 and PEEP as tolerated.   Continuous pulse oximetry. Maintain O2 sat >92%.     GI:   Diagnosis: Hx cirrhosis/varices/portal htn  No biopsy 2/2 low plts  Follows with Dr. Edenilson Ritchie seen on MRI, no EGD completed  Ammonia lvl wnl  Plan:   Monitor for s/s bleeding  Continue lactulose    :   Diagnosis: urinary leakage  Issues with marshall ON, leakage around marshall; concern that marshall catheter size is too small  Concern for exchanging due to thrombocytopenia  Plan:    Monitor UO, intermittently inaccurate due to leakage  Diagnosis: Respiratory alkalosis   Likely 2/2 to neuro insult  ABG this AM still showing resp alkalosis  Plan:   Monitor BMP    F/E/N:   Plan:   F: none  E: Monitor and replete electrolytes for Mg >2, Phos >3, K >4.  N: NPO, 250cc free water flushes       Heme/Onc:   Diagnosis: thrombocytopenia 2/2 cirrhosis  Prior BM biopsy no reports available  Plan:   Holding plt transfusion due to no plan for surgical intervention  Monitor CBC  Hold VTE ppx due to IPH    Endo:   Diagnosis: DM2  A1c 5.6  Plan:   Hold home metformin  SSI  Monitor BG q6h    ID:   Diagnosis: sepsis  Tmax 103.6 with tachypnea, hypotension, high WBC w/bandemia  Elevated procalc  Elevated lactate 4  Plan:   On cefepime/vanc for broad coverage  Pending: sputum cx, blood cx  Consider CXR  Tylenol q4h PRN  Monitor fever curve and WBC    MSK/Skin:   No active issues  Disposition: Critical care    ICU Core Measures     Vented Patient  VAP Bundle  VAP bundle ordered     A: Assess, Prevent, and Manage Pain Has pain been assessed? Yes  Need for changes to pain regimen? No   B: Both Spontaneous Awakening Trials (SATs) and Spontaneous Breathing Trials (SBTs) Plan to perform spontaneous awakening trial today? N/A   Plan to perform spontaneous breathing trial today? Yes   Obvious barriers to extubation? Yes   C: Choice of Sedation RASS Goal: 0 Alert and Calm  Need for changes to sedation or analgesia regimen? No   D: Delirium CAM-ICU: Unable to perform secondary to Traumatic Brain Injury   E: Early Mobility  Plan for early mobility? NA   F: Family Engagement Plan for family engagement today? Yes       Review of Invasive Devices:    [place] Plan: Continue for accurate I/O monitoring for 48 hours  Central access plan: Medications requiring central line  [place] Plan: Keep arterial line for hemodynamic monitoring    Prophylaxis:  VTE Contraindicated secondary to: ICH   Stress Ulcer  covered byomeprazole (PRILOSEC)  suspension 2 mg/mL [[id number]]         24 Hour Events   24hr events:   Had increased secretions as well as cough, desaturated into the 70s and was bradycardic with hypertension.  He was given labetalol and Lasix with a total of 1 L urine output.  Patient also became febrile to 103.6 with blood pressures in the 70s.  He was placed on levo, and is now on levo 10.  His lactate was 4 and remained at 4.  His antibiotic coverage was broadened to cefepime and bank.  His white blood count came back at 1.5 with bandemia of 20 and platelets of 28.  Triggered sepsis alert.  D5 is still running.    Subjective   Unarousable to verbal or tactile stimuli.        Objective                          Vitals I/O      Most Recent Min/Max in 24hrs   Temp 98.2 °F (36.8 °C) Temp  Min: 98.2 °F (36.8 °C)  Max: 103.6 °F (39.8 °C)   Pulse 74 Pulse  Min: 58  Max: 98   Resp 19 Resp  Min: 18  Max: 54   /61 BP  Min: 115/58  Max: 140/64   O2 Sat 99 % SpO2  Min: 93 %  Max: 100 %      Intake/Output Summary (Last 24 hours) at 9/14/2024 0612  Last data filed at 9/14/2024 0200  Gross per 24 hour   Intake 5979.86 ml   Output 1795 ml   Net 4184.86 ml       Diet Enteral/Parenteral; Tube Feeding No Oral Diet; Jevity 1.2 [place]; Continuous; 65    Invasive Monitoring           Physical Exam   Physical Exam  Eyes:      General: Scleral icterus present.         Right eye: Discharge present.         Left eye: Discharge present.     Comments: OU 1mm pupils, NR   Skin:     General: Skin is warm.      Capillary Refill: Capillary refill takes less than 2 seconds.   HENT:      Head: Normocephalic and atraumatic.      Mouth/Throat:      Mouth: Mucous membranes are moist.   Cardiovascular:      Rate and Rhythm: Normal rate and regular rhythm.      Heart sounds: No murmur heard.  Musculoskeletal:         General: No swelling.      Right lower leg: No edema.      Left lower leg: No edema.   Abdominal: General: Bowel sounds are normal. There is no distension.       Palpations: Abdomen is soft.   Constitutional:       General: He is not in acute distress.     Appearance: He is well-developed and well-nourished. He is not ill-appearing or toxic-appearing.      Interventions: He is sedated, intubated and restrained.   Pulmonary:      Effort: Pulmonary effort is normal. He is intubated.      Breath sounds: Rhonchi and rales present.   Neurological:      Mental Status: He is unresponsive.      GCS: GCS eye subscore is 1. GCS verbal subscore is 1. GCS motor subscore is 3.        Corneal reflex absent, cough reflex and gag reflex intact.      Comments: Decorticate posturing to pain in b/l UE, oculocephalic reflex absent   Genitourinary/Anorectal:  Lorenz present.        Diagnostic Studies        Lab Results: I have reviewed the following results:      Medications:  Scheduled PRN   cefepime, 2,000 mg, Q8H  chlorhexidine, 15 mL, Q12H DAYANA  insulin lispro, 1-5 Units, Q6H DAYANA  lactulose, 20 g, TID  levETIRAcetam, 750 mg, Q12H DAYANA  niCARdipine, ,   omeprazole (PRILOSEC) suspension 2 mg/mL, 20 mg, Daily  vancomycin, 750 mg, Q8H      acetaminophen, 650 mg, Q4H PRN  fentaNYL, 50 mcg, Q2H PRN  hydrALAZINE, 10 mg, Q6H PRN  labetalol, 10 mg, Q4H PRN  niCARdipine, ,        Continuous    dextrose, 50 mL/hr, Last Rate: 50 mL/hr (09/13/24 1241)  norepinephrine, 1-30 mcg/min, Last Rate: 10 mcg/min (09/14/24 0609)         Labs:   CBC    Recent Labs     09/13/24 0624 09/13/24  2347   WBC 3.89* 1.50*   HGB 10.1* 9.6*   HCT 30.8* 29.2*   PLT 32* 20*   BANDSPCT 35* 21*     BMP    Recent Labs     09/13/24 0624 09/13/24  2020   SODIUM 150* 146   K 2.9* 4.2   * 120*   CO2 15* 17*   AGAP 10 9   BUN 12 12   CREATININE 0.56* 0.78   CALCIUM 7.5* 8.0*       Coags    No recent results     Additional Electrolytes  Recent Labs     09/13/24 0624 09/13/24  1138 09/13/24 2020   MG 1.4*  --  1.8*   PHOS 2.6*  --  5.2*   CAIONIZED  --  1.13 1.15          Blood Gas    Recent Labs     09/13/24  3051   PHART 7.488*    [id number] 21.7*   PO2ART 175.1*   [id number] 16.1*   BEART -6.0   SOURCE Line, Arterial     Recent Labs     09/13/24  2358   SOURCE Line, Arterial    LFTs  No recent results    Infectious  Recent Labs     09/13/24  2347   PROCALCITONI 0.70*     Glucose  Recent Labs     09/12/24  1725 09/12/24 2019 09/13/24  0624 09/13/24 2020   GLUC 278* 191* 153* 161*

## 2024-09-14 NOTE — RESPIRATORY THERAPY NOTE
RT Ventilator Management Note  Sid Noel 44 y.o. male MRN: [id number]  Unit/Bed#: ICU 13 Encounter: [id number]      Daily Screen         9/13/2024  0700 9/13/2024 2145          Patient safety screen outcome:: Passed Failed      Not Ready for Weaning due to:: -- Underline problem not resolved                Physical Exam:   Assessment Type: Assess only  General Appearance: Sedated  Respiratory Pattern: Assisted  Chest Assessment: Chest expansion symmetrical  Bilateral Breath Sounds: Diminished  R Breath Sounds: Coarse, Rhonchi, Diminished  Cough: Productive  Suction: ET Tube  O2 Device: vent     09/13/24 2145   Respiratory Assessment   Assessment Type Assess only   General Appearance Sedated   Respiratory Pattern Assisted   Chest Assessment Chest expansion symmetrical   Bilateral Breath Sounds Diminished   R Breath Sounds Coarse;Rhonchi;Diminished   Cough Productive   Suction ET Tube   Resp Comments Received on PSV 6/6 and 100%, had an episode of desaturation down to the 70s requiring FiO2 of 100%. Pt noted to have red/ratna tinged secretions. RR in the mid 20s with Vt's of 800-1000cc for a Ve of 23-25, currently febrile. Attempted to place pt back on settings but pt appeared worse. Will continue to monitor.   O2 Device vent   Vent Information   Vent ID [id number]   Vent type Haas G5   Haas Vent Mode SPONT   $ Pulse Oximetry Spot Check Charge Completed   SpO2 95 %   SPONT Settings   FIO2 (%) 80 %   PEEP (cmH2O) 6 cmH2O   Pressure Support (cmH2O) 6 cmH20   Flow Trigger (LPM) 5 LPM   P-ramp (ms) 50 ms   ETS  (%) 25 %   Humidification Heater   Heater Temp 95 °F (35 °C)   SPONT Actuals   Resp Rate (BPM) 27 BPM   VT (mL) 911 mL   MV (Obs) 24.6   MAP (cmH2O) 9 cmH2O   Peak Pressure (cmH2O) 16 cmH2O   I:E Ratio (Obs) 1:2.1   Heater Temperature (Obs) 95 °F (35 °C)   SPONT Alarms   High Peak Pressure (cmH20) 40 cmH2O   High Resp Rate (BPM) 40 BPM   High MV (L/min) 24 L/min   Low MV (L/min) 4 L/min   High Spont VTE  (mL) 1200 mL   Low Spont VTE (mL) 300 mL   Apnea Time (S) 20 S   SPONT Apnea Settings   Resp Rate (BPM) 14 BPM  (PC 12)   FIO2 (%) 80 %   %TI (%) 1 %   Apnea Time (S) 20 S   Maintenance   Alarm (pink) cable attached No   Resuscitation bag with peep valve at bedside Yes   Water bag changed No   Circuit changed No   Daily Screen   Patient safety screen outcome: Failed   Not Ready for Weaning due to: Underline problem not resolved   ETT  Oral 8 mm   Placement Date/Time: 09/10/24 0012   Preoxygenated: Yes  Type: Oral  Tube Size: 8 mm  Laryngoscope: GlideScope  Location: Oral  Placement Verification: Auscultation;End tidal CO2  Secured at (cm): 22   Secured at (cm) 23   Measured from Lips   Secured Location Center   Repositioned Center to Right   Secured by Commercial tube oliveira   Site Condition Dry   Cuff Pressure (color) Green   HI-LO Suction  Intermittent suction   HI-LO Secretions Scant   HI-LO Intervention Patent       Resp Comments: Received on PSV 6/6 and 100%, had an episode of desaturation down to the 70s requiring FiO2 of 100%. Pt noted to have red/ratna tinged secretions. RR in the mid 20s with Vt's of 800-1000cc for a Ve of 23-25, currently febrile. Attempted to place pt back on settings but pt appeared worse. Will continue to monitor.

## 2024-09-14 NOTE — CONSULTS
Consultation - Palliative and Supportive Care   Sid Noel 44 y.o. male [id number]    Palliative care encounter  Assessment & Plan  Social support:  Supportive listening provided  Provided anticipatory guidance  Discussed spiritual needs : Quaker, family declines  support services  Advocated for patient/family with interdisciplinary care team    Coordination of care:  Reviewed case with ICU Dr. Pruitt/Dr. Arriola, RN Tomeka    Follow up  Palliative Care will continue to follow and goals of care discussions will be ongoing.   Please reach out via [facility] secure chat if questions or concerns arise.    I have reviewed the patient's controlled substance dispensing history in the Prescription Drug Monitoring Program in compliance with the [place] regulations before prescribing any controlled substances.  Last refills  No opioid or benzo refills in [place] over past year.    We appreciate the invitation to be involved in this patient's care. Please do not hesitate to reach our on call provider through our clinic answering service at [phone] should you have acute symptom control concerns.     Goals of care, counseling/discussion  Assessment & Plan  Goals:  Level 1 code status  Disease focused care without limits placed at this time  Plan for terminal extubation Monday morning, timing TBD  Will continue discussions regarding GOC as patient's clinical presentation evolves.  Encouraged follow up with Palliative Medicine on an outpatient basis after discharge for continued symptom management.  Our office will contact patient to schedule a hospital follow up.    Decisional apparatus:  Patient does not have capacity on exam today.  If capacity is lost, patient's substitute decision maker would default to spouse by [place].  ER contacts:Keith Calvin (Spouse)  [phone] (Mobile)   Advance Directive/Living Will/POLST: none on file  POA Forms: none on file    Cirrhosis of liver without ascites (HCC)  Assessment &  Plan  Crytogenic cirrhosis with portal HTN, varices, hyperammonemia (87) on admission   Follows with Dr. Jose Alejandro Gant GI/hepatology and Dr. Edenilson VILLAR GI  Unable to biopsy 2/2 thrombocytopenia  Management per ICU    * ICH (intracerebral hemorrhage) (HCC)  Assessment & Plan  9/10 CT head demonstrates extensive intracranial hemorrhage with significant mass effect, right to left midline shift, and subfalcine herniation as detailed above   Repeat CT head 9/10 demonstrates large right frontoparietal intraparenchymal hemorrhage, increased with progression of associated intraventricular hemorrhage. Stable mass effect with midline shift .There is again partial effacement of the suprasellar and quadrigeminal plate cisterns.Increased ventriculomegaly, most notable at the occipital and temporal horn regions.    Neurosurgery consulted, no surgical intervention recommended  EEG negative  Management per ICU        IDENTIFICATION:  Inpatient consult to Palliative Care  Consult performed by: VIANCA Camarillo  Consult ordered by: Deepti Arriola MD        History of Present Illness:  Sid Noel is a 44 y.o. male who presented to [facility] ED via EMS after found altered with intractable vomiting by spouse at home.  On admission, left-sided posturing and right pupil dilated fixed nonreactive noted, intubated for airway protection.  CT head demonstrated extensive intracranial hemorrhage, right to left midline shift, subfalcine herniation.  Neurosurgery was consulted, no surgical intervention recommended.  On review, several goals of care discussions have occurred with family regarding patient's poor prognosis and unlikely neurologic recovery, as of yesterday were requesting an additional 1-2 days to process and allow for family to visit. Palliative care consulted to assist in ongoing goals of care discussions.    Patient seen in ICU, no family present at bedside. Patient remains intubated, requiring low dose vasopressors for  support. Unresponsive to voice, off sedation. Posturing with noxious stimuli to upper extremities. Pupils non reactive.    Spoke with patient's spouse and brother via telephone. Introduced palliative care services. They have decided remove ventilatory support on Monday. Relayed plan for terminal extubation Monday morning to ICU team, timing TBD. Briefly discussed the process, including medications to prevent suffering. They will be coming to visit later today and coordinate further with the ICU team. They expressed concern about process to release his body from the hospital after death, specifically timing as they would like to ensure can complete important spiritual traditions. Patient and family are Taoist, currently they are coordinating  and burial proceedings. They have declined  services at this time. No further questions or concerns.     Review of Systems   Unable to perform ROS: Intubated     Past Medical History:   Diagnosis Date    Cirrhosis (HCC)     Diabetes mellitus (HCC)     Low platelet count (HCC)     Spleen enlarged      No past surgical history on file.  Social History     Socioeconomic History    Marital status: /Civil Union     Spouse name: Not on file    Number of children: Not on file    Years of education: Not on file    Highest education level: Not on file   Occupational History    Not on file   Tobacco Use    Smoking status: Never     Passive exposure: Never    Smokeless tobacco: Never   Vaping Use    Vaping status: Not on file   Substance and Sexual Activity    Alcohol use: Never    Drug use: Not Currently    Sexual activity: Not on file   Other Topics Concern    Not on file   Social History Narrative    Not on file     Social Determinants of Health     Financial Resource Strain: Not on file   Food Insecurity: No Food Insecurity (9/10/2024)    Hunger Vital Sign     Worried About Running Out of Food in the Last Year: Never true     Ran Out of Food in the Last Year:  "Never true   Transportation Needs: No Transportation Needs (9/10/2024)    PRAPARE - Transportation     Lack of Transportation (Medical): No     Lack of Transportation (Non-Medical): No   Physical Activity: Not on file   Stress: Not on file   Social Connections: Not on file   Intimate Partner Violence: Not on file   Housing Stability: Low Risk  (9/10/2024)    Housing Stability Vital Sign     Unable to Pay for Housing in the Last Year: No     Number of Times Moved in the Last Year: 0     Homeless in the Last Year: No     No family history on file.    Medications:  all current active meds have been reviewed and current meds:   Current Facility-Administered Medications:     acetaminophen (TYLENOL) oral suspension 650 mg, Q4H PRN    ceFEPime (MAXIPIME) 2,000 mg in dextrose 5 % 50 mL IVPB, Q8H, Last Rate: Stopped (09/14/24 0800)    chlorhexidine (PERIDEX) 0.12 % oral rinse 15 mL, Q12H DAYANA    dextrose 5 % infusion, Continuous, Last Rate: 50 mL/hr (09/13/24 1241)    fentaNYL injection 50 mcg, Q2H PRN    hydrALAZINE (APRESOLINE) injection 10 mg, Q6H PRN    insulin lispro (HumALOG/ADMELOG) 100 units/mL subcutaneous injection 1-5 Units, Q6H DAYANA **AND** Fingerstick Glucose (POCT), Q6H    labetalol (NORMODYNE) injection 10 mg, Q4H PRN    lactulose (CHRONULAC) oral solution 20 g, TID    [COMPLETED] levETIRAcetam (KEPPRA) injection 2,000 mg, Once **FOLLOWED BY** levETIRAcetam (KEPPRA) injection 750 mg, Q12H DAYANA    norepinephrine (LEVOPHED) 4 mg (STANDARD CONCENTRATION) IV in sodium chloride 0.9% 250 mL, Titrated, Last Rate: 2 mcg/min (09/14/24 0950)    omeprazole (PRILOSEC) suspension 2 mg/mL, Daily    vancomycin (VANCOCIN) IVPB (premix in dextrose) 750 mg 150 mL, Q8H, Last Rate: Stopped (09/14/24 0715)    Allergies   Allergen Reactions    Aspirin Other (See Comments)     Pt states per Doctor        Objective:  /61   Pulse 68   Temp 99 °F (37.2 °C)   Resp 21   Ht 5' 5\" (1.651 m) Comment: from 6/3/24 encounter  SpO2 95%  "  BMI 23.44 kg/m²     Physical Exam  Vitals and nursing note reviewed.   Constitutional:       General: He is not in acute distress.     Appearance: He is ill-appearing.      Interventions: He is intubated.   HENT:      Head: Atraumatic.      Mouth/Throat:      Mouth: Mucous membranes are dry.   Eyes:      General:         Right eye: Discharge present.         Left eye: Discharge present.     Comments: Pupils non reactive   Cardiovascular:      Rate and Rhythm: Normal rate.   Pulmonary:      Effort: No respiratory distress. He is intubated.      Comments: Intubated, ventilated  Skin:     General: Skin is warm and moist.   Neurological:      Mental Status: He is unresponsive.      Comments: Upper extremities posturing with noxious stimuli       Lab Results: I have personally reviewed pertinent labs., CBC:   Lab Results   Component Value Date    WBC 5.52 09/14/2024    HGB 10.8 (L) 09/14/2024    HCT 32.5 (L) 09/14/2024     (H) 09/14/2024    PLT 35 (L) 09/14/2024    RBC 3.01 (L) 09/14/2024    MCH 35.9 (H) 09/14/2024    MCHC 33.2 09/14/2024    RDW 17.2 (H) 09/14/2024    MPV 12.7 09/14/2024    NRBC 2 09/14/2024   , CMP:   Lab Results   Component Value Date    SODIUM 147 09/14/2024    K 3.6 09/14/2024     (H) 09/14/2024    CO2 18 (L) 09/14/2024    BUN 11 09/14/2024    CREATININE 0.74 09/14/2024    CALCIUM 7.3 (L) 09/14/2024    AST 55 (H) 09/14/2024    ALT 25 09/14/2024    ALKPHOS 69 09/14/2024    EGFR 112 09/14/2024     Imaging Studies: I have personally reviewed pertinent reports.  EKG, Pathology, and Other Studies: I have personally reviewed pertinent reports.    Counseling / Coordination of Care  Total floor / unit time spent today 50+ minutes. Greater than 50% of total time was spent with the patient and / or family counseling and / or coordination of care. A description of the counseling / coordination of care: Reviewed chart, provided medical updates, determined goals of care, discussed palliative care  "and symptom management, discussed comfort care and hospice care, discussed code status, provided anticipatory guidance, determined competency and POA/HCA, determined social/family support, provided psychosocial support. Reviewed with ICU team,  RN.    VIANCA Camarillo  Palliative and Supportive Care  Clinic/Answering Service: [phone]  You can find me on EPIC!    Portions of this document may have been created using dictation software and as such some \"sound alike\" terms may have been generated by the system. Do not hesitate to contact me with any questions or clarifications.     "

## 2024-09-14 NOTE — PROGRESS NOTES
Sid Noel is a 44 y.o. male who is currently ordered Vancomycin IV with management by the Pharmacy Consult service.  Relevant clinical data and objective / subjective history reviewed.  Vancomycin Assessment:  Indication and Goal AUC/Trough: Pneumonia (goal -600, trough >10)  Clinical Status: worsening  Micro:   : Blood culture  - pending  : Sputum culture and Gram stain (from tracheal aspirate) - pending  Renal Function: (baseline Scr: 0.5-0.8)  SCr: 0.78 mg/dL  CrCl: >100 mL/min  Renal replacement: Not on dialysis  Days of Therapy: 1  Current Dose: Loaded 1500 mg IV once  Vancomycin Plan:  New Dosin mg IV Q8H  Estimated AUC: 456 mcg*hr/mL  Estimated Trough: 12.8 mcg/mL  Next Level: 9/15 with AM labs  Renal Function Monitoring: Daily BMP and UOP  Pharmacy will continue to follow closely for s/sx of nephrotoxicity, infusion reactions and appropriateness of therapy.  BMP and CBC will be ordered per protocol. We will continue to follow the patient’s culture results and clinical progress daily.    Anne Gregory, Pharmacist

## 2024-09-14 NOTE — QUICK NOTE
Had a GOC discussion with family- wife and brother. They said that they have been having family discussions about the transition to comfort care and would like to plan for palliative extubation tomorrow evening. We also discussed patient's code status and they clarified that they would like patient to be DNR. Will continue to maintain medical care.    Deepti Arriola M.D.  PGY-1 Internal Medicine   [facility]      denies pain/discomfort

## 2024-09-14 NOTE — ASSESSMENT & PLAN NOTE
9/10 CT head demonstrates extensive intracranial hemorrhage with significant mass effect, right to left midline shift, and subfalcine herniation as detailed above   Repeat CT head 9/10 demonstrates large right frontoparietal intraparenchymal hemorrhage, increased with progression of associated intraventricular hemorrhage. Stable mass effect with midline shift .There is again partial effacement of the suprasellar and quadrigeminal plate cisterns.Increased ventriculomegaly, most notable at the occipital and temporal horn regions.    Neurosurgery consulted, no surgical intervention recommended  EEG negative  Management per ICU

## 2024-09-14 NOTE — PLAN OF CARE
Problem: PAIN - ADULT  Goal: Verbalizes/displays adequate comfort level or baseline comfort level  Description: Interventions:  - Encourage patient to monitor pain and request assistance  - Assess pain using appropriate pain scale  - Administer analgesics based on type and severity of pain and evaluate response  - Implement non-pharmacological measures as appropriate and evaluate response  - Consider cultural and social influences on pain and pain management  - Notify physician/advanced practitioner if interventions unsuccessful or patient reports new pain  Outcome: Progressing     Problem: INFECTION - ADULT  Goal: Absence or prevention of progression during hospitalization  Description: INTERVENTIONS:  - Assess and monitor for signs and symptoms of infection  - Monitor lab/diagnostic results  - Monitor all insertion sites, i.e. indwelling lines, tubes, and drains  - Monitor endotracheal if appropriate and nasal secretions for changes in amount and color  - Saxton appropriate cooling/warming therapies per order  - Administer medications as ordered  - Instruct and encourage patient and family to use good hand hygiene technique  - Identify and instruct in appropriate isolation precautions for identified infection/condition  Outcome: Progressing     Problem: INFECTION - ADULT  Goal: Absence of fever/infection during neutropenic period  Description: INTERVENTIONS:  - Monitor WBC    Outcome: Progressing     Problem: SAFETY ADULT  Goal: Patient will remain free of falls  Description: INTERVENTIONS:  - Educate patient/family on patient safety including physical limitations  - Instruct patient to call for assistance with activity   - Consult OT/PT to assist with strengthening/mobility   - Keep Call bell within reach  - Keep bed low and locked with side rails adjusted as appropriate  - Keep care items and personal belongings within reach  - Initiate and maintain comfort rounds  - Make Fall Risk Sign visible to staff  -  Offer Toileting every 2 Hours, in advance of need  - Initiate/Maintain Bed/Chair alarm  - Obtain necessary fall risk management equipment  - Apply yellow socks and bracelet for high fall risk patients  - Consider moving patient to room near nurses station  Outcome: Progressing

## 2024-09-15 NOTE — RESPIRATORY THERAPY NOTE
RT Ventilator Management Note  Sid Noel 44 y.o. male MRN: [id number]  Unit/Bed#: ICU 13 Encounter: [id number]      Daily Screen         9/14/2024  0719 9/14/2024 2041          Patient safety screen outcome:: Failed Failed      Not Ready for Weaning due to:: -- Underline problem not resolved                Physical Exam:   Assessment Type: Assess only  General Appearance: Sedated  Respiratory Pattern: Assisted  Chest Assessment: Chest expansion symmetrical  Bilateral Breath Sounds: Diminished, Coarse  O2 Device: vent     09/14/24 2041   Respiratory Assessment   Assessment Type Assess only   General Appearance Sedated   Respiratory Pattern Assisted   Chest Assessment Chest expansion symmetrical   Bilateral Breath Sounds Diminished;Coarse   Resp Comments Received pt on P-CMV 6/6 (total PIP 12)x12x50%, RR 24-32 with Vt's of 700-900, noted increased P0.1. Sxn'd for large amount of secretions. Pt noted to be febrile at this time. Plan to maintain current support.   O2 Device vent   Vent Information   Vent ID [id number]   Vent type Haas G5   Haas Vent Mode P-CMV/PCV+   $ Pulse Oximetry Spot Check Charge Completed   SpO2 96 %   P-CMV/PCV+ Settings   Resp Rate (BPM) 12 BPM   Pressure Control/Pinsp (cmH20) 6 cmH20   Insp Time (S) 1 sec   FiO2 (%) 50 %   PEEP (cmH2O) 6 cmH2O   I:E Ratio 1:4   Insp Resistance 1   P-ramp (ms) 50 (ms)   Flow Trigger (LPM) 5 LPM   Humidification Heater   Heater Temp 95 °F (35 °C)   P-CMV/PCV+ Actuals   Resp Rate (BPM) 28 BPM   VT (mL) 831 mL   MV 22.5   MAP (cmH2O) 11 cmH2O   Peak Pressure (cmH2O) 20 cmH2O   I:E Ratio (Obs) 1:1.1   Static Compliance (mL/cmH2O) 53 mL/cmH2O   Heater Temperature (Obs) 95 °F (35 °C)   P-CMV/PCV+ Alarms    High Peak Pressure (cmH2O) 40 cmH2O   Low Pressure (cmH2O) 8 cm H2O   High Resp Rate (BPM) 40 BPM   Low Resp Rate (BPM) 8 BPM   High MV (L/min) 25 L/min   Low MV (L/min) 4 L/min   High Spont VTE (mL) 1100 mL   Low Spont VTE (mL) 250 mL   Maintenance   Alarm  (pink) cable attached No   Resuscitation bag with peep valve at bedside Yes   Water bag changed No   Circuit changed No   Daily Screen   Patient safety screen outcome: Failed   Not Ready for Weaning due to: Underline problem not resolved   IHI Ventilator Associated Pneumonia Bundle   Daily Assessment of Readiness to Extubate Yes   ETT  Oral 8 mm   Placement Date/Time: 09/10/24 0012   Preoxygenated: Yes  Type: Oral  Tube Size: 8 mm  Laryngoscope: GlideScope  Location: Oral  Placement Verification: Auscultation;End tidal CO2  Secured at (cm): 22   Secured at (cm) 22   Measured from Lips   Secured Location Left   Repositioned Left to Center   Secured by Commercial tube oliveira   Site Condition Dry   Cuff Pressure (color) Green   HI-LO Suction  Intermittent suction   HI-LO Secretions Scant   HI-LO Intervention Patent         Resp Comments: Received pt on P-CMV 6/6 (total PIP 12)x12x50%, RR 24-32 with Vt's of 700-900, noted increased P0.1. Sxn'd for large amount of secretions. Pt noted to be febrile at this time. Plan to maintain current support.

## 2024-09-15 NOTE — PLAN OF CARE
Problem: PAIN - ADULT  Goal: Verbalizes/displays adequate comfort level or baseline comfort level  Description: Interventions:  - Encourage patient to monitor pain and request assistance  - Assess pain using appropriate pain scale  - Administer analgesics based on type and severity of pain and evaluate response  - Implement non-pharmacological measures as appropriate and evaluate response  - Consider cultural and social influences on pain and pain management  - Notify physician/advanced practitioner if interventions unsuccessful or patient reports new pain  Outcome: Progressing     Problem: INFECTION - ADULT  Goal: Absence or prevention of progression during hospitalization  Description: INTERVENTIONS:  - Assess and monitor for signs and symptoms of infection  - Monitor lab/diagnostic results  - Monitor all insertion sites, i.e. indwelling lines, tubes, and drains  - Monitor endotracheal if appropriate and nasal secretions for changes in amount and color  - [place] appropriate cooling/warming therapies per order  - Administer medications as ordered  - Instruct and encourage patient and family to use good hand hygiene technique  - Identify and instruct in appropriate isolation precautions for identified infection/condition  Outcome: Progressing  Goal: Absence of fever/infection during neutropenic period  Description: INTERVENTIONS:  - Monitor WBC    Outcome: Progressing     Problem: SAFETY ADULT  Goal: Patient will remain free of falls  Description: INTERVENTIONS:  - Educate patient/family on patient safety including physical limitations  - Instruct patient to call for assistance with activity   - Consult OT/PT to assist with strengthening/mobility   - Keep Call bell within reach  - Keep bed low and locked with side rails adjusted as appropriate  - Keep care items and personal belongings within reach  - Initiate and maintain comfort rounds  - Make Fall Risk Sign visible to staff  - Apply yellow socks and bracelet  for high fall risk patients  - Consider moving patient to room near nurses station  Outcome: Progressing  Goal: Maintain or return to baseline ADL function  Description: INTERVENTIONS:  -  Assess patient's ability to carry out ADLs; assess patient's baseline for ADL function and identify physical deficits which impact ability to perform ADLs (bathing, care of mouth/teeth, toileting, grooming, dressing, etc.)  - Assess/evaluate cause of self-care deficits   - Assess range of motion  - Assess patient's mobility; develop plan if impaired  - Assess patient's need for assistive devices and provide as appropriate  - Encourage maximum independence but intervene and supervise when necessary  - Involve family in performance of ADLs  - Assess for home care needs following discharge   - Consider OT consult to assist with ADL evaluation and planning for discharge  - Provide patient education as appropriate  Outcome: Progressing  Goal: Maintains/Returns to pre admission functional level  Description: INTERVENTIONS:  - Perform AM-PAC 6 Click Basic Mobility/ Daily Activity assessment daily.  - Set and communicate daily mobility goal to care team and patient/family/caregiver.   - Collaborate with rehabilitation services on mobility goals if consulted  - Out of bed for toileting  - Record patient progress and toleration of activity level   Outcome: Progressing     Problem: Knowledge Deficit  Goal: Patient/family/caregiver demonstrates understanding of disease process, treatment plan, medications, and discharge instructions  Description: Complete learning assessment and assess knowledge base.  Interventions:  - Provide teaching at level of understanding  - Provide teaching via preferred learning methods  Outcome: Progressing

## 2024-09-15 NOTE — PROGRESS NOTES
Progress Note - Critical Care/ICU   Name: Sid Noel 44 y.o. male I MRN: [id number]  Unit/Bed#: ICU 13 I Date of Admission: 9/10/2024   Date of Service: 9/15/2024 I Hospital Day: 5       Assessment & Plan   Neuro:   Diagnosis: IPH  CTH-Extensive intracranial hemorrhage with significant mass effect, right to left midline shift, and subfalcine herniation as detailed above.   9/10 CTA: Unremarkable CTA neck and brain without large vessel arterial occlusion, significant flow-limiting stenosis, or focal saccular aneurysm. No active contrast extravasation   Per neurosurgery, no indication for surgical intervention at this time  EEG negative for epileptiform activity  Neuroexam: Persistant decorticate and decerebrate posturing  Plan:   Neurochecks every 1 hours  Keppra 750 mg every 12 hours, consider DC with negative VEEG  Na goal 145-155, currently at goal  Analgesia: Multimodal, as needed Tylenol, fentanyl  Plan to go comfort care tomorrow evening with compassionate extubation  Speak with family today to confirm plans and to provide support     CV:   Diagnosis: HLD  Home medication: atorvastatin 10  Plan:   Hold statin  Diagnosis: labile blood pressures  Likely multifactorial: herniation, sepsis  Plan:   Continuous cardiopulmonary monitoring. Maintain MAP >65.  PRN hydralazine, labetolol q6h prn BP >150  Levophed to maintain MAP >65  See sepsis plan    Pulm:  Diagnosis: Acute respiratory failure secondary to mental status  Vent: Spont 6/6 40%.   Intermittently tachypneic  ABG yesterday PM still showing respiratory alkalosis  Plan:  Daily SAT/SBT. Wean FiO2 and PEEP as tolerated.   Continuous pulse oximetry. Maintain O2 sat >92%.     GI:   Diagnosis: Hx cirrhosis/varices/portal htn  No biopsy 2/2 low plts  Follows with Dr. Edenilson Ritchie seen on MRI, no EGD completed  Ammonia lvl wnl  Plan:   Monitor for s/s bleeding  Continue lactulose     :   Diagnosis: urinary leakage  Issues with marshall ON, leakage around marshall;  concern that marshall catheter size is too small  Concern for exchanging due to thrombocytopenia  Plan:   Monitor UO, intermittently inaccurate due to leakage  Diagnosis: Respiratory alkalosis   Likely 2/2 to neuro insult  ABG still showing resp alkalosis  Plan:   Monitor BMP     F/E/N:   Plan:   F: none at this time  E: Monitor and replete electrolytes for Mg >2, Phos >3, K >4.  Repleted Mg, Phos, K today  N: NPO, 250cc free water flushes        Heme/Onc:   Diagnosis: thrombocytopenia 2/2 cirrhosis  Prior BM biopsy no reports available  Plan:   Holding plt transfusion due to no plan for surgical intervention  Monitor CBC  Hold VTE ppx due to IPH     Endo:   Diagnosis: DM2  A1c 5.6  Plan:   Hold home metformin  SSI  Monitor BG q6h     ID:   Diagnosis: sepsis  Tmax 104.4 with tachypnea, hypotension, high WBC w/bandemia  Elevated procalc  Elevated lactate 4  Contributing factors = central due to significant bleed and infectious  Plan:   On cefepime/vanc for broad coverage  Pending: sputum cx, blood cx  Sputum: 2+ gram pos cocci in pairs  Bcx NGTD  Consider CXR  Tylenol q4h PRN  Monitor fever curve and WBC     MSK/Skin: No active issues    Disposition: Critical care    ICU Core Measures     Vented Patient  VAP Bundle  VAP bundle ordered     A: Assess, Prevent, and Manage Pain Has pain been assessed? Yes  Need for changes to pain regimen? No   B: Both Spontaneous Awakening Trials (SATs) and Spontaneous Breathing Trials (SBTs) Plan to perform spontaneous awakening trial today? Will plan for compassionate extubation later today per family's request  Plan to perform spontaneous breathing trial today? Will plan for compassionate extubation later today per family's request  Obvious barriers to extubation? Yes   C: Choice of Sedation RASS Goal: 0 Alert and Calm  Need for changes to sedation or analgesia regimen? No   D: Delirium CAM-ICU: Unable to perform secondary to Traumatic Brain Injury   E: Early Mobility  Plan for early  mobility? NA   F: Family Engagement Plan for family engagement today? Yes       Antibiotic Review: Awaiting culture results.  and Continue broad spectrum secondary to severity of illness.     Review of Invasive Devices:    Lorenz Plan: Continue for accurate I/O monitoring for 48 hours and end of life care  Central access plan: Medications requiring central line  Carolin Plan: Keep arterial line for hemodynamic monitoring    Prophylaxis:  VTE Contraindicated secondary to: ICH   Stress Ulcer  covered byomeprazole (PRILOSEC) suspension 2 mg/mL [[id number]]         24 Hour Events   24hr events: Sid was febrile overnight with Tmax 104.4. Given IV Tylenol and temp now wnl    Subjective   Unable to obtain, as patient is unresponsive to verbal or tactile stimulation.    Review of Systems: Review of Systems not obtainable due to Clinical Condition    Objective                          Vitals I/O      Most Recent Min/Max in 24hrs   Temp 97.9 °F (36.6 °C) Temp  Min: 97.9 °F (36.6 °C)  Max: 104.4 °F (40.2 °C)   Pulse 66 Pulse  Min: 66  Max: 102   Resp (!) 0 Resp  Min: 0  Max: 40   /61 No data recorded   O2 Sat 93 % SpO2  Min: 93 %  Max: 100 %      Intake/Output Summary (Last 24 hours) at 9/15/2024 0826  Last data filed at 9/15/2024 0800  Gross per 24 hour   Intake 3811.9 ml   Output 4675 ml   Net -863.1 ml       Diet Enteral/Parenteral; Tube Feeding No Oral Diet; Jevity 1.2 Leonidas; Continuous; 65    Invasive Monitoring   Arterial Line  Carolin /102  Arterial Line BP  Min: 92/40  Max: 212/102    mmHg  Arterial Line MAP (mmHg)  Min: 52 mmHg  Max: 140 mmHg           Physical Exam   Physical Exam  Eyes:      General: No foreign body in eyeScleral icterus present.         Right eye: No discharge.         Left eye: No discharge.      Comments: Pupils not reactive to light. R constricted, L dilated   Skin:     General: Skin is warm and dry.      Capillary Refill: Capillary refill takes less than 2 seconds.      Findings: No  "rash.   HENT:      Head: Normocephalic and atraumatic.      Right Ear: No drainage.      Left Ear: No drainage.      Nose: Nasogastric tube present.      Mouth/Throat:      Mouth: Mucous membranes are moist.   Cardiovascular:      Rate and Rhythm: Normal rate and regular rhythm.      Heart sounds: No murmur heard.     No friction rub. No gallop.   Musculoskeletal:      Right lower leg: No edema.      Left lower leg: No edema.   Abdominal: General: Bowel sounds are normal. There is no distension.      Palpations: Abdomen is soft.   Constitutional:       Appearance: He is well-developed. He is ill-appearing.      Interventions: He is sedated and intubated.   Pulmonary:      Effort: He is intubated.      Breath sounds: Normal breath sounds.   Neurological:      Mental Status: He is unresponsive.        Corneal reflex absent, cough reflex and gag reflex intact.      Comments: Decorticate posturing on patient's R to pain, decerebrate to pain on patient's L to pain. Triple flexion to pain in b/l LEs   Genitourinary/Anorectal:     Rectum: Rectal tube present.   Lorenz present.        Diagnostic Studies        Lab Results: I have reviewed the following results: CBC/BMP:   .     09/15/24  0432   WBC 4.77   HGB 10.3*   HCT 31.6*   PLT 18*   SODIUM 148*   K 2.7*   *   CO2 17*   BUN 14   CREATININE 0.82   GLUC 170*   CAIONIZED 1.11*   MG 1.7*   PHOS 1.9*    , Creatinine Clearance: Estimated Creatinine Clearance: 101 mL/min (by C-G formula based on SCr of 0.82 mg/dL)., LFTs:   .     09/15/24  0432   AST 55*   ALT 25   ALB 2.2*   TBILI 3.72*   ALKPHOS 175*    , PTT/INR:No new results in last 24 hours. , Lactic Acid: No new results in last 24 hours. , Procalcitonin: No results found for: \"PROCALCITONI\", ABG: No new results in last 24 hours. , Blood Culture: No results found for: \"BLOODCX\", Sputum Culture: No results found for: \"SPUTUMCULTUR\"     Medications:  Scheduled PRN   bromocriptine, 5 mg, Q8H  cefepime, 2,000 mg, " Q8H  chlorhexidine, 15 mL, Q12H DAYANA  insulin lispro, 1-5 Units, Q6H DAYANA  lactulose, 20 g, TID  levETIRAcetam, 750 mg, Q12H DAYANA  magnesium sulfate, 4 g, Once  omeprazole (PRILOSEC) suspension 2 mg/mL, 20 mg, Daily  potassium chloride, 40 mEq, Once  potassium phosphate, 30 mmol, Once  vancomycin, 750 mg, Q8H      acetaminophen, 650 mg, Q4H PRN  fentaNYL, 50 mcg, Q2H PRN  hydrALAZINE, 10 mg, Q6H PRN  labetalol, 10 mg, Q4H PRN       Continuous    norepinephrine, 1-30 mcg/min, Last Rate: 18 mcg/min (09/15/24 0812)         Labs:   CBC    Recent Labs     09/13/24  2347 09/14/24  0603 09/15/24  0432   WBC 1.50* 5.52 4.77   HGB 9.6* 10.8* 10.3*   HCT 29.2* 32.5* 31.6*   PLT 20* 35* 18*   BANDSPCT 21*  --   --      BMP    Recent Labs     09/14/24  0603 09/15/24  0432   SODIUM 147 148*   K 3.6 2.7*   * 121*   CO2 18* 17*   AGAP 9 10   BUN 11 14   CREATININE 0.74 0.82   CALCIUM 7.3* 7.5*       Coags    No recent results     Additional Electrolytes  Recent Labs     09/14/24  0603 09/15/24  0432   MG 2.0 1.7*   PHOS 2.8 1.9*   CAIONIZED 1.08* 1.11*          Blood Gas    Recent Labs     09/13/24 2358   PHART 7.488*   JIG6HAW 21.7*   PO2ART 175.1*   LER5DBN 16.1*   BEART -6.0   SOURCE Line, Arterial     Recent Labs     09/13/24 2358   SOURCE Line, Arterial    LFTs  Recent Labs     09/14/24  0603 09/15/24  0432   ALT 25 25   AST 55* 55*   ALKPHOS 69 175*   ALB 2.5* 2.2*   TBILI 5.01* 3.72*       Infectious  Recent Labs     09/13/24 2347   PROCALCITONI 0.70*     Glucose  Recent Labs     09/13/24  2020 09/14/24  0603 09/15/24  0432   GLUC 161* 202* 170*        Mario Reeves, MS4

## 2024-09-15 NOTE — PROGRESS NOTES
Sid Noel is a 44 y.o. male who is currently ordered Vancomycin IV with management by the Pharmacy Consult service.  Relevant clinical data and objective / subjective history reviewed.  Vancomycin Assessment:  Indication and Goal AUC/Trough: Pneumonia (goal -600, trough >10)  Clinical Status: worsening  Micro:     Renal Function: (baseline Scr: 0.5-0.8)  SCr: 0.82 mg/dL (was 0.78 mg/dL)  CrCl: 100 mL/min (was >100 mL/min)  Renal replacement: Not on dialysis  Days of Therapy: 2  Current Dose: vancomycin 750 mg IV q 8 hr  Vancomycin Plan: random trough drawn today (9/15) at 0432 was high at 22.5.  Due to new predicted high AUC/trough, liliana will reduce the vancomycin to 500 mg IV q 8 hr and to start at 2200 tonight (9/15)  New Dosing: vancomycin 500 mg IV q 8 hr  Estimated AUC: 412 mcg hr/mL  Estimated Trough: 12.7 mcg/mL  Next Level: Sun 9/22 with AM labs  Renal Function Monitoring: Daily BMP and UOP  Pharmacy will continue to follow closely for s/sx of nephrotoxicity, infusion reactions and appropriateness of therapy.  BMP and CBC will be ordered per protocol. We will continue to follow the patient’s culture results and clinical progress daily.    Delmer Wolf, R. Ph., Pharmacist

## 2024-09-15 NOTE — RESPIRATORY THERAPY NOTE
Resp Care   09/15/24 0723   Respiratory Assessment   Assessment Type Assess only   General Appearance Sedated   Respiratory Pattern Assisted   Chest Assessment Chest expansion symmetrical   Bilateral Breath Sounds Coarse   Suction ET Tube   Resp Comments pt found on documented vent settings, no changes made at this time, spo2 is 100%, bs are coarse, pt sx via ett, will cont to monitor per resp protocol.   Vent Information   Vent ID [id number]   Vent type Haas G5   Haas Vent Mode P-CMV/PCV+   $ Pulse Oximetry Spot Check Charge Completed   P-CMV/PCV+ Settings   Resp Rate (BPM) 12 BPM   Pressure Control/Pinsp (cmH20) 6 cmH20   Insp Time (S) 1 sec   FiO2 (%) 40 %   PEEP (cmH2O) 6 cmH2O   I:E Ratio 1/4   Insp Resistance 4   P-ramp (ms) 50 (ms)   Flow Trigger (LPM) 5 LPM   Humidification Heater   Heater Temp 95 °F (35 °C)   P-CMV/PCV+ Actuals   Resp Rate (BPM) 21 BPM   VT (mL) 447 mL   MV 9.5   MAP (cmH2O) 8.4 cmH2O   Peak Pressure (cmH2O) 15 cmH2O   I:E Ratio (Obs) 1/1.9   Static Compliance (mL/cmH2O) 55.2 mL/cmH2O   Plateau Pressure (cmH2O) 13.1 cmH2O   Heater Temperature (Obs) 95 °F (35 °C)   P-CMV/PCV+ Alarms    High Peak Pressure (cmH2O) 40 cmH2O   Low Pressure (cmH2O) 8 cm H2O   High Resp Rate (BPM) 40 BPM   Low Resp Rate (BPM) 8 BPM   High MV (L/min) 25 L/min   Low MV (L/min) 4 L/min   High Spont VTE (mL) 1200 mL   Low Spont VTE (mL) 250 mL   Maintenance   Alarm (pink) cable attached No   Resuscitation bag with peep valve at bedside Yes   Water bag changed No   Circuit changed No   Daily Screen   Patient safety screen outcome: Failed   Not Ready for Weaning due to: Underline problem not resolved   IHI Ventilator Associated Pneumonia Bundle   Daily Assessment of Readiness to Extubate Yes   Head of Bed Elevated HOB 30   ETT  Oral 8 mm   Placement Date/Time: 09/10/24 0012   Preoxygenated: Yes  Type: Oral  Tube Size: 8 mm  Laryngoscope: GlideScope  Location: Oral  Placement Verification: Auscultation;End tidal  CO2  Secured at (cm): 22   Secured at (cm) 22   Measured from Lips   Secured Location Right   Repositioned Center to Right   Secured by Commercial tube oliveira   Site Condition Dry   Cuff Pressure (color) Green   HI-LO Suction  Intermittent suction   HI-LO Secretions Scant   HI-LO Intervention Patent

## 2024-09-16 ENCOUNTER — TELEPHONE (OUTPATIENT)
Age: 44
End: 2024-09-16

## 2024-09-16 LAB
BACTERIA SPT RESP CULT: ABNORMAL
GRAM STN SPEC: ABNORMAL
GRAM STN SPEC: ABNORMAL

## 2024-09-16 NOTE — TELEPHONE ENCOUNTER
[facility] speak to the nursing supervisor regarding if patient is released. Caller was provided with main hospital number to locate patient. No information on patient's current location or clinical status was given to caller by this triager.

## 2024-09-16 NOTE — TELEPHONE ENCOUNTER
Received call from patient's wife Aston Calvin [phone]. She was trying to reach [facility] ICU (where pt was admitted). Advised wife that she reached  Neurology. Was attempting to locate number to [facility] to provide to wife when call was disconnected.

## 2024-09-16 NOTE — DISCHARGE SUMMARY
"Admission Date: 9/10/2024     Admitting Diagnosis: Intracranial hemorrhage (HCC) [I62.9]    HPI: H&P per Thomas, \"45 yo man presented in coma state with evidence of large IPH. Last known well was 1130 pm. Per his wife he was in his normal state of health at bedtime. She woke to find him vomiting continuously. She tried to help him to move up in the bed but he wasn't responding. She called EMS. They administered narcan x 2 without response. On arrival to ED he was noted to have a right dilated pupil, extensor posturing on left, some non purposeful movement on right. He was intubated for airway protection. Imaging with above findings. Patient has known history of easy bleeding, thrombocytopenia out of proportion to cirrhosis, hematology is doing a work up. Baseline platelets remain between 20-30k. He reportedly does not respond to transfusions.   On initial conversation with neurosurgery, he would not be safe for hemicraniectomy due to bleeding risk. Platelet transfusion and 1 gm TXA given emergently. TEG sent.\"    Procedures Performed:   Orders Placed This Encounter   Procedures    Central Line       Summary of Hospital Course:  Admitted with large ICH with intraventricular extension.  Patient has cirrhosis, portal hypertension, DM2. Due to platelets 20-30,000 patient unable to get neurosurgery. Family made decision to transition to comfort care.    Significant Findings, Care, Treatment and Services Provided: as above       Disposition:      Final Diagnosis: Intercerebral hemorrhage    Medical Problems        Condition at Time of Death: Comfort care  Date, Time and Cause of Death    Date of Death: 9/15/24  Time of Death:  8:22 PM  Preliminary Cause of Death: ICH (intracerebral hemorrhage) (HCC)  Entered by: VIANCA Perez[LB1.1]       Attribution       LB1.1 VIANCA Hoffmann 09/15/24 20:40            Death Note:  INPATIENT DEATH NOTE  Sid Noel 44 y.o. male MRN: [id number]  Unit/Bed#: ICU 13 " Encounter: [id number]    Date, Time and Cause of Death    Date of Death: 9/15/24  Time of Death:  8:22 PM  Preliminary Cause of Death: ICH (intracerebral hemorrhage) (HCC)  Entered by: VIANCA Perez[LB1.1]       Attribution       LB1.1 VIANCA Hoffmann 09/15/24 20:40             Patient's Information  Pronounced by: VIANCA Perez  Did the patient's death occur in the ED?: No  Did the patient's death occur in the OR?: No  Did the patient's death occur less than 10 days post-op?: No  Did the patient's death occur within 24 hours of admission?: No  Was code status DNR at the time of death?: Yes    PHYSICAL EXAM:  Unresponsive to noxious stimuli, Spontaneous respirations absent, Breath sounds absent, Carotid pulse absent, Heart sounds absent, Pupillary light reflex absent, and Corneal blink reflex absent    Medical Examiner notification criteria:  NONE APPLICABLE       Family Notification  Was the family notified?: Yes  Date Notified: 09/15/24  Time Notified:   Notified by: Lou Gregory  Name of Family Notified of Death: Keith   Relationship to Patient: Spouse  Family Notification Route: At bedside  Was the family told to contact a  home?: Yes  Name of  Home:: [facility]    Autopsy Options:  Post-mortem examination declined by next of kin    Primary Service Attending Physician notified?:  yes - Attending:  Dr. Sumner     Physician/Resident responsible for completing Discharge Summary:  VIANCA Perez

## 2024-09-16 NOTE — DEATH NOTE
INPATIENT DEATH NOTE  Sid Noel 44 y.o. male MRN: [id number]  Unit/Bed#: ICU 13 Encounter: [id number]    Date, Time and Cause of Death    Date of Death: 9/15/24  Time of Death:  8:22 PM  Preliminary Cause of Death: ICH (intracerebral hemorrhage) (HCC)  Entered by: VIANCA Perez[LB1.1]       Attribution       LB1.1 VIANCA Hoffmann 09/15/24 20:40             Patient's Information  Pronounced by: VIANCA Perez  Did the patient's death occur in the ED?: No  Did the patient's death occur in the OR?: No  Did the patient's death occur less than 10 days post-op?: No  Did the patient's death occur within 24 hours of admission?: No  Was code status DNR at the time of death?: Yes    PHYSICAL EXAM:  Unresponsive to noxious stimuli, Spontaneous respirations absent, Breath sounds absent, Carotid pulse absent, Heart sounds absent, Pupillary light reflex absent, and Corneal blink reflex absent    Medical Examiner notification criteria:  NONE APPLICABLE       Family Notification  Was the family notified?: Yes  Date Notified: 09/15/24  Time Notified:   Notified by: Lou Gregory  Name of Family Notified of Death: Felishanenoemi   Relationship to Patient: Spouse  Family Notification Route: At bedside  Was the family told to contact a  home?: Yes  Name of  Home:: [facility]    Autopsy Options:  Post-mortem examination declined by next of kin    Primary Service Attending Physician notified?:  yes - Attending:  Dr. Sumner     Physician/Resident responsible for completing Discharge Summary:  VIANCA Perez

## 2024-09-19 LAB
BACTERIA BLD CULT: NORMAL
BACTERIA BLD CULT: NORMAL

## 2024-09-20 PROCEDURE — 95720 EEG PHY/QHP EA INCR W/VEEG: CPT | Performed by: PSYCHIATRY & NEUROLOGY

## 2024-09-25 ENCOUNTER — TELEPHONE (OUTPATIENT)
Age: 44
End: 2024-09-25

## 2024-09-25 NOTE — TELEPHONE ENCOUNTER
Pt's wife called stating that pt passed away on 9/15 and she was supposed to receive death certificate and has not received this.     Advised that she has reached out neurology office.  I recommended that she call  hospital-gave her hospital phone number and transferred call to hospital